# Patient Record
Sex: FEMALE | Race: BLACK OR AFRICAN AMERICAN | NOT HISPANIC OR LATINO | URBAN - METROPOLITAN AREA
[De-identification: names, ages, dates, MRNs, and addresses within clinical notes are randomized per-mention and may not be internally consistent; named-entity substitution may affect disease eponyms.]

---

## 2017-09-12 ENCOUNTER — INPATIENT (INPATIENT)
Facility: HOSPITAL | Age: 82
LOS: 5 days | Discharge: NURSING, REHAB OR LONG-TERM CARE | DRG: 639 | End: 2017-09-18
Attending: HOSPITALIST | Admitting: HOSPITALIST
Payer: COMMERCIAL

## 2017-09-12 DIAGNOSIS — R00.1 BRADYCARDIA, UNSPECIFIED: ICD-10-CM

## 2017-09-12 DIAGNOSIS — E78.5 HYPERLIPIDEMIA, UNSPECIFIED: ICD-10-CM

## 2017-09-12 DIAGNOSIS — E11.65 TYPE 2 DIABETES MELLITUS WITH HYPERGLYCEMIA: ICD-10-CM
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
## 2017-09-12 DIAGNOSIS — F03.90 UNSPECIFIED DEMENTIA WITHOUT BEHAVIORAL DISTURBANCE: ICD-10-CM

## 2017-09-12 DIAGNOSIS — R53.1 WEAKNESS: ICD-10-CM

## 2017-09-12 DIAGNOSIS — Z88.0 ALLERGY STATUS TO PENICILLIN: ICD-10-CM

## 2017-09-12 DIAGNOSIS — Z88.8 ALLERGY STATUS TO OTHER DRUGS, MEDICAMENTS AND BIOLOGICAL SUBSTANCES: ICD-10-CM

## 2017-09-12 DIAGNOSIS — I10 ESSENTIAL (PRIMARY) HYPERTENSION: ICD-10-CM

## 2017-09-12 DIAGNOSIS — T44.1X5A ADVERSE EFFECT OF OTHER PARASYMPATHOMIMETICS [CHOLINERGICS], INITIAL ENCOUNTER: ICD-10-CM

## 2017-09-12 PROCEDURE — 99223 1ST HOSP IP/OBS HIGH 75: CPT

## 2017-09-12 PROCEDURE — 71010: CPT | Mod: 26

## 2017-09-12 PROCEDURE — 93010 ELECTROCARDIOGRAM REPORT: CPT

## 2017-09-15 PROCEDURE — 99233 SBSQ HOSP IP/OBS HIGH 50: CPT

## 2017-09-17 PROCEDURE — 99233 SBSQ HOSP IP/OBS HIGH 50: CPT

## 2017-09-18 PROCEDURE — 99239 HOSP IP/OBS DSCHRG MGMT >30: CPT

## 2017-09-23 PROCEDURE — 93010 ELECTROCARDIOGRAM REPORT: CPT

## 2017-09-23 PROCEDURE — 71010: CPT | Mod: 26

## 2017-09-24 ENCOUNTER — INPATIENT (INPATIENT)
Facility: HOSPITAL | Age: 82
LOS: 2 days | Discharge: HOME | End: 2017-09-27
Attending: SURGERY | Admitting: SURGERY
Payer: MEDICARE

## 2017-09-24 ENCOUNTER — INPATIENT (INPATIENT)
Facility: HOSPITAL | Age: 82
LOS: 0 days | Discharge: ANOTHER HOSPITAL | DRG: 300 | End: 2017-09-24
Attending: INTERNAL MEDICINE | Admitting: INTERNAL MEDICINE
Payer: MEDICARE

## 2017-09-24 VITALS
TEMPERATURE: 98 F | RESPIRATION RATE: 16 BRPM | SYSTOLIC BLOOD PRESSURE: 97 MMHG | OXYGEN SATURATION: 97 % | DIASTOLIC BLOOD PRESSURE: 56 MMHG | HEART RATE: 76 BPM

## 2017-09-24 DIAGNOSIS — Z85.54 PERSONAL HISTORY OF MALIGNANT NEOPLASM OF URETER: ICD-10-CM

## 2017-09-24 DIAGNOSIS — E11.9 TYPE 2 DIABETES MELLITUS WITHOUT COMPLICATIONS: ICD-10-CM

## 2017-09-24 DIAGNOSIS — I10 ESSENTIAL (PRIMARY) HYPERTENSION: ICD-10-CM

## 2017-09-24 DIAGNOSIS — F03.90 UNSPECIFIED DEMENTIA WITHOUT BEHAVIORAL DISTURBANCE: ICD-10-CM

## 2017-09-24 DIAGNOSIS — N13.30 UNSPECIFIED HYDRONEPHROSIS: ICD-10-CM

## 2017-09-24 DIAGNOSIS — D50.0 IRON DEFICIENCY ANEMIA SECONDARY TO BLOOD LOSS (CHRONIC): ICD-10-CM

## 2017-09-24 DIAGNOSIS — R10.13 EPIGASTRIC PAIN: ICD-10-CM

## 2017-09-24 DIAGNOSIS — R73.9 HYPERGLYCEMIA, UNSPECIFIED: ICD-10-CM

## 2017-09-24 DIAGNOSIS — Z88.0 ALLERGY STATUS TO PENICILLIN: ICD-10-CM

## 2017-09-24 DIAGNOSIS — Z88.8 ALLERGY STATUS TO OTHER DRUGS, MEDICAMENTS AND BIOLOGICAL SUBSTANCES: ICD-10-CM

## 2017-09-24 DIAGNOSIS — K86.3 PSEUDOCYST OF PANCREAS: ICD-10-CM

## 2017-09-24 DIAGNOSIS — E78.5 HYPERLIPIDEMIA, UNSPECIFIED: ICD-10-CM

## 2017-09-24 DIAGNOSIS — I72.8 ANEURYSM OF OTHER SPECIFIED ARTERIES: ICD-10-CM

## 2017-09-24 LAB
ALBUMIN SERPL ELPH-MCNC: 3.4 G/DL — SIGNIFICANT CHANGE UP (ref 3.3–5)
ALP SERPL-CCNC: 80 U/L — SIGNIFICANT CHANGE UP (ref 40–120)
ALT FLD-CCNC: 18 U/L — SIGNIFICANT CHANGE UP (ref 4–33)
APTT BLD: 31.6 SEC — SIGNIFICANT CHANGE UP (ref 27.5–37.4)
AST SERPL-CCNC: 22 U/L — SIGNIFICANT CHANGE UP (ref 4–32)
BASOPHILS # BLD AUTO: 0.02 K/UL — SIGNIFICANT CHANGE UP (ref 0–0.2)
BASOPHILS NFR BLD AUTO: 0.4 % — SIGNIFICANT CHANGE UP (ref 0–2)
BILIRUB SERPL-MCNC: 0.2 MG/DL — SIGNIFICANT CHANGE UP (ref 0.2–1.2)
BLD GP AB SCN SERPL QL: NEGATIVE — SIGNIFICANT CHANGE UP
BUN SERPL-MCNC: 15 MG/DL — SIGNIFICANT CHANGE UP (ref 7–23)
CALCIUM SERPL-MCNC: 8.6 MG/DL — SIGNIFICANT CHANGE UP (ref 8.4–10.5)
CHLORIDE SERPL-SCNC: 102 MMOL/L — SIGNIFICANT CHANGE UP (ref 98–107)
CO2 SERPL-SCNC: 24 MMOL/L — SIGNIFICANT CHANGE UP (ref 22–31)
CREAT SERPL-MCNC: 0.9 MG/DL — SIGNIFICANT CHANGE UP (ref 0.5–1.3)
EOSINOPHIL # BLD AUTO: 0.11 K/UL — SIGNIFICANT CHANGE UP (ref 0–0.5)
EOSINOPHIL NFR BLD AUTO: 2.4 % — SIGNIFICANT CHANGE UP (ref 0–6)
GLUCOSE SERPL-MCNC: 261 MG/DL — HIGH (ref 70–99)
HCT VFR BLD CALC: 27.3 % — LOW (ref 34.5–45)
HGB BLD-MCNC: 8.1 G/DL — LOW (ref 11.5–15.5)
IMM GRANULOCYTES # BLD AUTO: 0.01 # — SIGNIFICANT CHANGE UP
IMM GRANULOCYTES NFR BLD AUTO: 0.2 % — SIGNIFICANT CHANGE UP (ref 0–1.5)
INR BLD: 1 — SIGNIFICANT CHANGE UP (ref 0.88–1.17)
LYMPHOCYTES # BLD AUTO: 1.09 K/UL — SIGNIFICANT CHANGE UP (ref 1–3.3)
LYMPHOCYTES # BLD AUTO: 23.8 % — SIGNIFICANT CHANGE UP (ref 13–44)
MCHC RBC-ENTMCNC: 28.4 PG — SIGNIFICANT CHANGE UP (ref 27–34)
MCHC RBC-ENTMCNC: 29.7 % — LOW (ref 32–36)
MCV RBC AUTO: 95.8 FL — SIGNIFICANT CHANGE UP (ref 80–100)
MONOCYTES # BLD AUTO: 0.39 K/UL — SIGNIFICANT CHANGE UP (ref 0–0.9)
MONOCYTES NFR BLD AUTO: 8.5 % — SIGNIFICANT CHANGE UP (ref 2–14)
NEUTROPHILS # BLD AUTO: 2.96 K/UL — SIGNIFICANT CHANGE UP (ref 1.8–7.4)
NEUTROPHILS NFR BLD AUTO: 64.7 % — SIGNIFICANT CHANGE UP (ref 43–77)
NRBC # FLD: 0 — SIGNIFICANT CHANGE UP
PLATELET # BLD AUTO: 208 K/UL — SIGNIFICANT CHANGE UP (ref 150–400)
PMV BLD: 11.1 FL — SIGNIFICANT CHANGE UP (ref 7–13)
POTASSIUM SERPL-MCNC: 4.5 MMOL/L — SIGNIFICANT CHANGE UP (ref 3.5–5.3)
POTASSIUM SERPL-SCNC: 4.5 MMOL/L — SIGNIFICANT CHANGE UP (ref 3.5–5.3)
PROT SERPL-MCNC: 7 G/DL — SIGNIFICANT CHANGE UP (ref 6–8.3)
PROTHROM AB SERPL-ACNC: 11.2 SEC — SIGNIFICANT CHANGE UP (ref 9.8–13.1)
RBC # BLD: 2.85 M/UL — LOW (ref 3.8–5.2)
RBC # FLD: 15.6 % — HIGH (ref 10.3–14.5)
RH IG SCN BLD-IMP: POSITIVE — SIGNIFICANT CHANGE UP
RH IG SCN BLD-IMP: POSITIVE — SIGNIFICANT CHANGE UP
SODIUM SERPL-SCNC: 140 MMOL/L — SIGNIFICANT CHANGE UP (ref 135–145)
WBC # BLD: 4.58 K/UL — SIGNIFICANT CHANGE UP (ref 3.8–10.5)
WBC # FLD AUTO: 4.58 K/UL — SIGNIFICANT CHANGE UP (ref 3.8–10.5)

## 2017-09-24 PROCEDURE — 99223 1ST HOSP IP/OBS HIGH 75: CPT

## 2017-09-24 PROCEDURE — 74176 CT ABD & PELVIS W/O CONTRAST: CPT | Mod: 26

## 2017-09-24 PROCEDURE — 74174 CTA ABD&PLVS W/CONTRAST: CPT | Mod: 26

## 2017-09-24 PROCEDURE — 99223 1ST HOSP IP/OBS HIGH 75: CPT | Mod: AI

## 2017-09-24 NOTE — ED ADULT NURSE REASSESSMENT NOTE - NS ED NURSE REASSESS COMMENT FT1
report recd from daytime rn, patient resting comfortably in stretcher, patient a*ox1 at baseline, unsure of day or situation. denies pain or discomfort, respirations are even and unlabored. vitals stable. will continue to monitor patient closely. awaiting dispo.

## 2017-09-24 NOTE — CONSULT NOTE ADULT - SUBJECTIVE AND OBJECTIVE BOX
** INCOMPLETE    HISTORY OF PRESENT ILLNESS:  DOREEN BROWN is a 82y Female     PAST MEDICAL HISTORY:  pancreatic pseudocyst, DM, dementia      PAST SURGICAL HISTORY: lap howard, hysterectomy, ureter cancer resection     FAMILY HISTORY: Unknown    SOCIAL HISTORY: quit smoking 30 years ago, no alcohol use     CODE STATUS: DNR    HOME MEDICATIONS: amlodipine 10 mg, Aricept 10 mg, Zetia 10 mg, Namenda 10 mg BID, Quetiapine 25 mg BID, Ramipril 5 mg, Sertraline 52 mg, Insulin      ALLERGIES: penicillin (Unknown)  statins (Unknown)      VITAL SIGNS:  ICU Vital Signs Last 24 Hrs  T(C): 36.7 (24 Sep 2017 22:03), Max: 36.9 (24 Sep 2017 18:50)  T(F): 98 (24 Sep 2017 22:03), Max: 98.4 (24 Sep 2017 18:50)  HR: 60 (24 Sep 2017 22:50) (60 - 76)  BP: 115/58 (24 Sep 2017 22:50) (97/56 - 139/71)  BP(mean): --  ABP: --  ABP(mean): --  RR: 15 (24 Sep 2017 22:50) (15 - 18)  SpO2: 100% (24 Sep 2017 22:50) (97% - 100%)      NEURO  Exam:  Meds:    RESPIRATORY  Mechanical Ventilation:     Exam:  Meds:    CARDIOVASCULAR    Exam:  Cardiac Rhythm:  Meds:    GI/NUTRITION  Exam:  Diet:  Meds:    GENITOURINARY/RENAL  Meds:      09-24    140  |  102  |  15  ----------------------------<  261<H>  4.5   |  24  |  0.90    Ca    8.6      24 Sep 2017 19:25    TPro  7.0  /  Alb  3.4  /  TBili  0.2  /  DBili  x   /  AST  22  /  ALT  18  /  AlkPhos  80  09-24    [ ] Hood catheter, indication: urine output monitoring in critically ill patient    HEMATOLOGIC  [ ] VTE Prophylaxis:                          8.1    4.58  )-----------( 208      ( 24 Sep 2017 19:25 )             27.3     PT/INR - ( 24 Sep 2017 19:25 )   PT: 11.2 SEC;   INR: 1.00          PTT - ( 24 Sep 2017 19:25 )  PTT:31.6 SEC  Transfusion: [ ] PRBC	[ ] Platelets	[ ] FFP	[ ] Cryoprecipitate      INFECTIOUS DISEASES  Meds:  RECENT CULTURES:      ENDOCRINE  Meds:  CAPILLARY BLOOD GLUCOSE          PATIENT CARE ACCESS DEVICES:  [ ] Peripheral IV  [ ] Central Venous Line	[ ] R	[ ] L	[ ] IJ	[ ] Fem	[ ] SC	Placed:   [ ] Arterial Line		[ ] R	[ ] L	[ ] Fem	[ ] Rad	[ ] Ax	Placed:   [ ] PICC:					[ ] Mediport  [ ] Urinary Catheter, Date Placed:   [x] Necessity of urinary, arterial, and venous catheters discussed    OTHER MEDICATIONS:     IMAGING STUDIES: HISTORY OF PRESENT ILLNESS:  DOREEN BROWN is a 82y woman with DM, Alzheimer's dementia (AO x 1 at baseline) who presented to [place] [facility] with epigastric pain for one day. 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 Patient transferred to [facility] for definitive management.     SICU consulted for hemodynamic monitoring.     PAST MEDICAL HISTORY:  pancreatic pseudocyst, DM, dementia      PAST SURGICAL HISTORY: lap howard, hysterectomy, ureter cancer resection     FAMILY HISTORY: Unknown    SOCIAL HISTORY: quit smoking 30 years ago, no alcohol use     CODE STATUS: DNR    HOME MEDICATIONS: amlodipine 10 mg, Aricept 10 mg, Zetia 10 mg, Namenda 10 mg BID, Quetiapine 25 mg BID, Ramipril 5 mg, Sertraline 52 mg, Insulin      ALLERGIES: penicillin (Unknown)  statins (Unknown)      VITAL SIGNS:  ICU Vital Signs Last 24 Hrs  T(C): 36.7 (24 Sep 2017 22:03), Max: 36.9 (24 Sep 2017 18:50)  T(F): 98 (24 Sep 2017 22:03), Max: 98.4 (24 Sep 2017 18:50)  HR: 60 (24 Sep 2017 22:50) (60 - 76)  BP: 115/58 (24 Sep 2017 22:50) (97/56 - 139/71)  BP(mean): --  ABP: --  ABP(mean): --  RR: 15 (24 Sep 2017 22:50) (15 - 18)  SpO2: 100% (24 Sep 2017 22:50) (97% - 100%)      NEURO  Exam: Alert, oriented to name and location "hospital" only. Patient unsure why she is admitted despite frequent explanations.   Meds: x    RESPIRATORY  Mechanical Ventilation: x    Exam: CTAB  Meds: x    CARDIOVASCULAR    Exam: S1/ S2  Cardiac Rhythm: sinus  Meds: x    GI/NUTRITION  Exam: Soft, NT, ND. Infraumbilical midline well-healed scar. Subxiphoid well-healed scar.   Diet: NPO  Meds: x    GENITOURINARY/RENAL  Meds: x      09-24    140  |  102  |  15  ----------------------------<  261<H>  4.5   |  24  |  0.90    Ca    8.6      24 Sep 2017 19:25    TPro  7.0  /  Alb  3.4  /  TBili  0.2  /  DBili  x   /  AST  22  /  ALT  18  /  AlkPhos  80  09-24    [ ] Hood catheter, indication: urine output monitoring in critically ill patient    HEMATOLOGIC  [ ] VTE Prophylaxis:                          8.1    4.58  )-----------( 208      ( 24 Sep 2017 19:25 )             27.3     PT/INR - ( 24 Sep 2017 19:25 )   PT: 11.2 SEC;   INR: 1.00          PTT - ( 24 Sep 2017 19:25 )  PTT:31.6 SEC  Transfusion: [ ] PRBC	[ ] Platelets	[ ] FFP	[ ] Cryoprecipitate      INFECTIOUS DISEASES  Meds:   RECENT CULTURES:      ENDOCRINE  Meds:  CAPILLARY BLOOD GLUCOSE          PATIENT CARE ACCESS DEVICES:  [ ] Peripheral IV  [ ] Central Venous Line	[ ] R	[ ] L	[ ] IJ	[ ] Fem	[ ] SC	Placed:   [ ] Arterial Line		[ ] R	[ ] L	[ ] Fem	[ ] Rad	[ ] Ax	Placed:   [ ] PICC:					[ ] Mediport  [ ] Urinary Catheter, Date Placed:   [x] Necessity of urinary, arterial, and venous catheters discussed    OTHER MEDICATIONS:     IMAGING STUDIES: EXAM: CT ANGIO ABD PELV (W)AW IC    PROCEDURE DATE: 09/24/2017      INTERPRETATION: CLINICAL INFORMATION: Splenic artery aneurysm    COMPARISON: Noncontrast CT of the abdomen and pelvis dated 9/24/2017    PROCEDURE:  CTA of the abdomen and pelvis was performed without oral contrast. 90 cc of  Omnipaque 350 were administered. 10 cc were discarded. Coronal and sagittal  reformats were performed.    FINDINGS:    LOWER CHEST: Trace right basilar atelectasis.    LIVER: For a few small arterially enhancing lesions within the right hepatic  lobe. In addition there are several subcentimeter hypodensities which are  too small to characterize.  BILE DUCTS: Normal caliber.  GALLBLADDER: Status post cholecystectomy.  SPLEEN: Demonstration of a 9.9 cm subcapsular splenic collection. There are  2 hyperenhancing lesions within the spleen measuring up to 1.6 cm which are  indeterminate.  PANCREAS: Within normal limits.  ADRENALS: Within normal limits.  KIDNEYS/URETERS: Severe left hydroureteronephrosis. There are several  cortical subcentimeter hypodensities which are too small to characterize.    BLADDER: Well distended.  REPRODUCTIVE ORGANS: Status post hysterectomy.    BOWEL: No bowel obstruction. Extensive colonic diverticulosis  PERITONEUM: No ascites.  VESSELS: Marked atherosclerotic changes of the aorta and branching vessels.  One seems distal splenic artery aneurysm, as above. There is mild stenosis  at the origin of the celiac axis with poststenotic dilatation.  RETROPERITONEUM: There is redemonstration of a mixed density collection in  the left hemiabdomen which measures 6.9 x 5.6 x 5.6 cm , centered around a  1 cm enhancing focus connected to a distal branch of the splenic artery.  There are a large number of surgical clips in the retroperitoneum  ABDOMINAL WALL: Within normal limits.  BONES: Available degenerative changes of the spine with mild dextro  scoliosis of the lumbar spine. Degenerative changes of the hips.    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 No active  extravasation identified at this time. This critical finding was discussed  with Dr. Taqueria Gomez at 4:30 PM on 9/24/2017.    Severe left hydronephrosis and large subcapsular splenic collection,  unchanged from the prior study.    Additional findings as above. HISTORY OF PRESENT ILLNESS:  DOREEN BROWN is a 82y woman with DM, Alzheimer's dementia (AO x 1 at baseline) who presented to [facility] with epigastric pain for one day. Per report, the patient's pain is in the upper abdomen, no dizziness or lightheadedness. The patient is without complaints at this time and is unsure why she is in the hospital. No upper or lower GI bleeding. She was diagnosed with pancreatic pseudocyst 1 year ago but has not had symptoms related to it.      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 The patient was transferred to [facility] for definitive management. SICU consulted for hemodynamic monitoring.     PAST MEDICAL HISTORY:  pancreatic pseudocyst, DM, dementia      PAST SURGICAL HISTORY: lap howard, hysterectomy, ureter cancer resection     FAMILY HISTORY: Unknown    SOCIAL HISTORY: quit smoking 30 years ago, no alcohol use     CODE STATUS: DNR    HOME MEDICATIONS: amlodipine 10 mg, Aricept 10 mg, Zetia 10 mg, Namenda 10 mg BID, Quetiapine 25 mg BID, Ramipril 5 mg, Sertraline 52 mg, Insulin      ALLERGIES: penicillin (Unknown)  statins (Unknown)      VITAL SIGNS:  ICU Vital Signs Last 24 Hrs  T(C): 36.7 (24 Sep 2017 22:03), Max: 36.9 (24 Sep 2017 18:50)  T(F): 98 (24 Sep 2017 22:03), Max: 98.4 (24 Sep 2017 18:50)  HR: 60 (24 Sep 2017 22:50) (60 - 76)  BP: 115/58 (24 Sep 2017 22:50) (97/56 - 139/71)  BP(mean): --  ABP: --  ABP(mean): --  RR: 15 (24 Sep 2017 22:50) (15 - 18)  SpO2: 100% (24 Sep 2017 22:50) (97% - 100%)      NEURO  Exam: Alert, oriented to name and location "hospital" only. Patient unsure why she is admitted despite frequent explanations.   Meds: x    RESPIRATORY  Mechanical Ventilation: x    Exam: CTAB  Meds: x    CARDIOVASCULAR    Exam: S1/ S2  Cardiac Rhythm: sinus  Meds: x    GI/NUTRITION  Exam: Soft, NT, ND. Infraumbilical midline well-healed scar. Subxiphoid well-healed scar.   Diet: NPO  Meds: x    GENITOURINARY/RENAL  Meds: x      09-24    140  |  102  |  15  ----------------------------<  261<H>  4.5   |  24  |  0.90    Ca    8.6      24 Sep 2017 19:25    TPro  7.0  /  Alb  3.4  /  TBili  0.2  /  DBili  x   /  AST  22  /  ALT  18  /  AlkPhos  80  09-24    [ ] Hood catheter, indication: urine output monitoring in critically ill patient    HEMATOLOGIC  [ ] VTE Prophylaxis:                          8.1    4.58  )-----------( 208      ( 24 Sep 2017 19:25 )             27.3     PT/INR - ( 24 Sep 2017 19:25 )   PT: 11.2 SEC;   INR: 1.00          PTT - ( 24 Sep 2017 19:25 )  PTT:31.6 SEC  Transfusion: [ ] PRBC	[ ] Platelets	[ ] FFP	[ ] Cryoprecipitate      INFECTIOUS DISEASES  Meds:   RECENT CULTURES:      ENDOCRINE  Meds:  CAPILLARY BLOOD GLUCOSE          PATIENT CARE ACCESS DEVICES:  [ ] Peripheral IV  [ ] Central Venous Line	[ ] R	[ ] L	[ ] IJ	[ ] Fem	[ ] SC	Placed:   [ ] Arterial Line		[ ] R	[ ] L	[ ] Fem	[ ] Rad	[ ] Ax	Placed:   [ ] PICC:					[ ] Mediport  [ ] Urinary Catheter, Date Placed:   [x] Necessity of urinary, arterial, and venous catheters discussed    OTHER MEDICATIONS:     IMAGING STUDIES: EXAM: CT ANGIO ABD PELV (W)AW IC    PROCEDURE DATE: 09/24/2017      INTERPRETATION: CLINICAL INFORMATION: Splenic artery aneurysm    COMPARISON: Noncontrast CT of the abdomen and pelvis dated 9/24/2017    PROCEDURE:  CTA of the abdomen and pelvis was performed without oral contrast. 90 cc of  Omnipaque 350 were administered. 10 cc were discarded. Coronal and sagittal  reformats were performed.    FINDINGS:    LOWER CHEST: Trace right basilar atelectasis.    LIVER: For a few small arterially enhancing lesions within the right hepatic  lobe. In addition there are several subcentimeter hypodensities which are  too small to characterize.  BILE DUCTS: Normal caliber.  GALLBLADDER: Status post cholecystectomy.  SPLEEN: Demonstration of a 9.9 cm subcapsular splenic collection. There are  2 hyperenhancing lesions within the spleen measuring up to 1.6 cm which are  indeterminate.  PANCREAS: Within normal limits.  ADRENALS: Within normal limits.  KIDNEYS/URETERS: Severe left hydroureteronephrosis. There are several  cortical subcentimeter hypodensities which are too small to characterize.    BLADDER: Well distended.  REPRODUCTIVE ORGANS: Status post hysterectomy.    BOWEL: No bowel obstruction. Extensive colonic diverticulosis  PERITONEUM: No ascites.  VESSELS: Marked atherosclerotic changes of the aorta and branching vessels.  One seems distal splenic artery aneurysm, as above. There is mild stenosis  at the origin of the celiac axis with poststenotic dilatation.  RETROPERITONEUM: There is redemonstration of a mixed density collection in  the left hemiabdomen which measures 6.9 x 5.6 x 5.6 cm , centered around a  1 cm enhancing focus connected to a distal branch of the splenic artery.  There are a large number of surgical clips in the retroperitoneum  ABDOMINAL WALL: Within normal limits.  BONES: Available degenerative changes of the spine with mild dextro  scoliosis of the lumbar spine. Degenerative changes of the hips.    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 No active  extravasation identified at this time. This critical finding was discussed  with Dr. Taqueria Gomez at 4:30 PM on 9/24/2017.    Severe left hydronephrosis and large subcapsular splenic collection,  unchanged from the prior study.    Additional findings as above.

## 2017-09-24 NOTE — H&P ADULT - HISTORY OF PRESENT ILLNESS
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 She was sent for evaluation by vascular surgery. Now she has no complaints although she is poor historian with dementia.   History obtained from Son (Mr. Decker). Patient is AOx1 at baseline. She lives in assisted living. She was recently hospitalized at [facility] and diagnosed with DM. Her pain is in the upper abdomen, no dizziness or lightheadedness. No upper or lower GI bleeding. She was diagnosed with pancreatic pseudocyst 1 year ago but has not had symptoms related to it.     PMH: pancreatic pseudocyst, DM, dementia    PSH: lap howard, hysterectomy, ureter cancer resection   SH: quit smoking 30 years ago, no alcohol use   Meds: amlodipine 10 mg, Aricept 10 mg, Zetia 10 mg, Namenda 10 mg BID, Quetiapine 25 mg BID, Ramipril 5 mg, Sertraline 52 mg, Insulin    Allergies: PCN, statin     Vital Signs Last 24 Hrs  T(C): 36.7 (24 Sep 2017 22:03), Max: 36.9 (24 Sep 2017 18:50)  T(F): 98 (24 Sep 2017 22:03), Max: 98.4 (24 Sep 2017 18:50)  HR: 72 (24 Sep 2017 22:03) (72 - 76)  BP: 139/71 (24 Sep 2017 22:03) (97/56 - 139/71)  BP(mean): --  RR: 18 (24 Sep 2017 22:03) (16 - 18)  SpO2: 99% (24 Sep 2017 22:03) (97% - 100%)     Physical Exam:   General : pleasantly demented, AOx1   Reps: CTA b/l   CV: RRR  Abd; soft, ND, NTTP, no palpable masses                 8.1                  140  | 24   | 15           4.58  >-----------< 208     ------------------------< 261                   27.3                 4.5  | 102  | 0.90                                         Ca 8.6   Mg x     Ph x          A/P: 82 year old female with splenic artery pseudoaneurysm which recently bled.   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 She was sent for evaluation by vascular surgery. Now she has no complaints although she is poor historian with dementia.   History obtained from Son (Mr. Decker). Patient is AOx1 at baseline. She lives in assisted living. She was recently hospitalized at [place] and diagnosed with DM. Her pain is in the upper abdomen, no dizziness or lightheadedness. No upper or lower GI bleeding. She was diagnosed with pancreatic pseudocyst 1 year ago but has not had symptoms related to it.     PMH: pancreatic pseudocyst, DM, dementia    PSH: lap howard, hysterectomy, ureter cancer resection   SH: quit smoking 30 years ago, no alcohol use   Meds: amlodipine 10 mg, Aricept 10 mg, Zetia 10 mg, Namenda 10 mg BID, Quetiapine 25 mg BID, Ramipril 5 mg, Sertraline 52 mg, Insulin    Allergies: PCN, statin     ROS: unable to obtain 2/2 dementia    Vital Signs Last 24 Hrs  T(C): 36.7 (24 Sep 2017 22:03), Max: 36.9 (24 Sep 2017 18:50)  T(F): 98 (24 Sep 2017 22:03), Max: 98.4 (24 Sep 2017 18:50)  HR: 72 (24 Sep 2017 22:03) (72 - 76)  BP: 139/71 (24 Sep 2017 22:03) (97/56 - 139/71)  BP(mean): --  RR: 18 (24 Sep 2017 22:03) (16 - 18)  SpO2: 99% (24 Sep 2017 22:03) (97% - 100%)     Physical Exam:   General : pleasantly demented, AOx1   Reps: CTA b/l   CV: RRR  Abd; soft, ND, NTTP, no palpable masses                 8.1                  140  | 24   | 15           4.58  >-----------< 208     ------------------------< 261                   27.3                 4.5  | 102  | 0.90                                         Ca 8.6   Mg x     Ph x          A/P: 82 year old female with splenic artery pseudoaneurysm which recently bled.   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 She was sent for evaluation by vascular surgery. Now she has no complaints although she is poor historian with dementia.   History obtained from Son (Mr. Decker). Patient is AOx1 at baseline. She lives in assisted living. She was recently hospitalized at [place] and diagnosed with DM. Her pain is in the upper abdomen, no dizziness or lightheadedness. No upper or lower GI bleeding. She was diagnosed with pancreatic pseudocyst 1 year ago but has not had symptoms related to it.     PMH: pancreatic pseudocyst, DM, dementia    PSH: lap howard, hysterectomy, ureter cancer resection   SH: quit smoking 30 years ago, no alcohol use   Meds: amlodipine 10 mg, Aricept 10 mg, Zetia 10 mg, Namenda 10 mg BID, Quetiapine 25 mg BID, Ramipril 5 mg, Sertraline 52 mg, Insulin    Allergies: PCN, statin     ROS: unable to obtain 2/2 dementia    Vital Signs Last 24 Hrs  T(C): 36.7 (24 Sep 2017 22:03), Max: 36.9 (24 Sep 2017 18:50)  T(F): 98 (24 Sep 2017 22:03), Max: 98.4 (24 Sep 2017 18:50)  HR: 72 (24 Sep 2017 22:03) (72 - 76)  BP: 139/71 (24 Sep 2017 22:03) (97/56 - 139/71)  BP(mean): --  RR: 18 (24 Sep 2017 22:03) (16 - 18)  SpO2: 99% (24 Sep 2017 22:03) (97% - 100%)     Physical Exam:   General : pleasantly demented, AOx1   Reps: CTA b/l   CV: RRR  Abd; soft, ND, NTTP, no palpable masses                 8.1                  140  | 24   | 15           4.58  >-----------< 208     ------------------------< 261                   27.3                 4.5  | 102  | 0.90                                         Ca 8.6   Mg x     Ph x          A/P: 82 year old female with splenic artery pseudoaneurysm which recently bled.   - IR consult for emergent embolization. The vascular fellow (Dr. Gilbert), senior resident (Dr. Alaniz), myself (Dr. Duke) spoke with IR team on call regarding this patient around 08:20 PM. As per IR, there is no need to perform this procedure urgently as the patient is hemodynamically stable and she has not been NPO for > 8 hours. This patient was transferred to the surgical ICU for higher level of nursing care.   - SICU consult.   - NPO   - Discussed with vascular fellow

## 2017-09-24 NOTE — ED PROVIDER NOTE - PHYSICAL EXAMINATION
Gen: NAD, AOx1, non-toxic //            Head: NCAT //            HEENT: EOMI, oral mucosa dry normal conjunctiva //            Lung: CTAB, no respiratory distress, no wheezes/rhonchi/rales B/L, speaking in full sentences. //            CV: RRR, no murmurs, rubs or gallops //            Abd: soft, tenderness to palpation in LUQ, no guarding, no CVA tenderness //            MSK: no visible deformities //            Neuro: No focal sensory or motor deficits //            Skin: Warm, well perfused, no rash //            Psych: normal affect. ~Yannick Wiggins M.D., Ph.D. -Resident

## 2017-09-24 NOTE — ED ADULT NURSE NOTE - OBJECTIVE STATEMENT
pt rec'd to R#[id number], transferred from [facility] for vascular consult r/t splenic artery aneurism found on CT today after pt c/o abd pain. Pt arrives in NAD, denies any abdominal pain/ other acute complaints. VSS. Oriented to self; PMH dementia. PIVx2 in place as documented in FS. Seen by MD. Awaiting test results and surg consult. Will continue to monitor.

## 2017-09-24 NOTE — ED PROVIDER NOTE - ATTENDING CONTRIBUTION TO CARE
Attending Statement: I have personally seen and examined this patient. I have fully participated in the care of this patient. I have reviewed all pertinent clinical information, including history physical exam, plan and the Resident's note and agree except as noted  81yo F hx of DM, pancreatic pseudocyst, dementia transferred from [place] for splenic aneurysm  on CT abdomen. Pt presented  w abdominal pain as per note. pt denies any complaints at this time. States "I think I had some pain" no nausea/vomit no chest pain or sob. pt poor historian. no family at bedside.   Vital signs noted. nontoxic, pleasant demented female. normal S1-S2 No resp distress. able to speak in full and clear sentences. no wheeze, rales or stridor. soft nondistended nontender abdomen.  plan labs, vascular surgery consult.

## 2017-09-24 NOTE — ED PROVIDER NOTE - OBJECTIVE STATEMENT
82 female history of DM2, pancreatic psyudocyst, dementia transferred by ambulance from [place] for abdominal pain, found to have splenic artery aneurism. She was sent for evaluation by vascular surgery.   PCP: Angel 82 female history of DM2, pancreatic psyudocyst, dementia transferred by ambulance from [place] for abdominal pain, found to have splenic artery aneurism on CTA abdomen. She was sent for evaluation by vascular surgery. Now she has no complaints although she is poor historian with dementia.   PCP: Angel 82 female history of DM2, pancreatic psyudocyst, dementia transferred by ambulance from [place] for abdominal pain, found to have splenic artery aneurism on CTA abdomen. She was sent for evaluation by vascular surgery. Now she has no complaints although she is poor historian with dementia.   PCP: Angel  Accepting ER doc: Dr. Canela  Accepting Vascular Sx: Dr. Leung

## 2017-09-24 NOTE — ED PROVIDER NOTE - PROGRESS NOTE DETAILS
Patient stable. Consulted gen surg resident for vascular consult right before placing the orders for this patient. Surgery resident and fellow, however, were busy with SICU emergency so disposition was made at 2200 to SICU for IR embolization and high level of care.

## 2017-09-24 NOTE — ED ADULT NURSE NOTE - CHIEF COMPLAINT QUOTE
pt BIBA from [facility].  pt transferred to [facility] for CT Surgery.  pt c/o abd pain and was found to have a spenic artery anuerism

## 2017-09-24 NOTE — H&P ADULT - ATTENDING COMMENTS
Pt. was seen and examined. Agree with above. Plan d/w the team.  83yo F with dementia transferred from an outside hospital for abd pain and splenic art aneurysms with surrounding hematoma  Pt. is hemodynamically stable  Abd pain resolved  Hg 8 from 9 in [facility]  imaging reviewed: large hematoma around the spleen, no active extrav, one intraparenchymal splenic and one in the hylum  Plan: admit to ICU  serial Hg  IR was contacted for embolization

## 2017-09-24 NOTE — ED ADULT TRIAGE NOTE - CHIEF COMPLAINT QUOTE
pt BIBA from [facility].  pt transferred to [facility] for CT Surgery.  pt c/o abd pain and was found to have a spenic artery anuerism

## 2017-09-24 NOTE — ED PROVIDER NOTE - MEDICAL DECISION MAKING DETAILS
Patient transferred here with known Splenic A. Aneurism. Armida is consulted. Will do pre-op labs, patient's pain is well controlled at this time, dispo pending sx.

## 2017-09-24 NOTE — ED PROVIDER NOTE - CARE PLAN
Principal Discharge DX:	Epigastric abdominal pain Principal Discharge DX:	Epigastric abdominal pain  Secondary Diagnosis:	Splenic artery aneurysm

## 2017-09-24 NOTE — CONSULT NOTE ADULT - ASSESSMENT
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   - If patient becomes hypotensive, will anticipate an emergent splenectomy    GI:   - NPO / IVF    Renal: No acute issues    Heme:     ID: No acute issues     Endo: DM   - ISS q6      Dispo: SICU; DNR  Patient discussed with SICU attending, Dr. 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     Pulm: No acute issues     CV: Splenic artery pseudoaneurysm  - SBP goal of < 160 to reduce shear stress; will give metoprolol 5 q6  - IR splenic artery embolization Monday morning.   - If patient becomes hypotensive, will anticipate an emergent splenectomy     GI: Malnutrition  - NPO / IVF for IR    Renal: No acute issues  - Will consider mucomyst for renal protection prior to IR angiography     Heme: PSA with recent bleed, VTE prophylaxis  - Monitor CBC q4; transfuse for hbg < 7  - Pharmacologic VTE ppx contraindicated; mechanical VTE ppx with SCDs only    ID: No acute issues   Lines: PIV only. No indication for Hood catheter at this time.     Endo: DM   - ISS q6      Dispo: SICU; DNR  Patient discussed with SICU attending, Dr. 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     Pulm: No acute issues     CV: Splenic artery pseudoaneurysm  - SBP goal of < 160 to reduce shear stress; will give metoprolol 5 q6  - IR splenic artery embolization Monday morning.   - If patient becomes hypotensive, will anticipate an emergent splenectomy     GI: Malnutrition  - NPO / IVF for IR    Renal: No acute issues  - Will consider mucomyst for renal protection prior to IR angiography     Heme: PSA with recent bleed, VTE prophylaxis  - Monitor CBC q4; transfuse for hgb < 7  - Pharmacologic VTE ppx contraindicated; mechanical VTE ppx with SCDs only    ID: No acute issues   Lines: PIV only. No indication for Hood catheter at this time.     Endo: DM   - ISS q6      Dispo: SICU; DNR  Patient discussed with SICU attending, Dr. 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     Pulm: No acute issues     CV: Splenic artery pseudoaneurysm  - SBP goal of < 160 to reduce shear stress; will give metoprolol 5 q6  - IR splenic artery embolization Monday morning.   - If patient becomes hypotensive, will anticipate an emergent splenectomy     GI: Malnutrition  - NPO / IVF for IR    Renal: No acute issues  - Will consider mucomyst for renal protection prior to IR angiography     Heme: PSA with recent bleed, VTE prophylaxis  - Monitor CBC q4; transfuse for hgb < 7. Patient has an active Type and Screen.   - Pharmacologic VTE ppx contraindicated; mechanical VTE ppx with SCDs only    ID: No acute issues   Lines: PIV only. No indication for Hood catheter at this time.     Endo: DM   - ISS q6      Dispo: SICU; DNR  Patient discussed with SICU attending, Dr. Mikel Baca MD PGY2  SICU Spectra: [phone]

## 2017-09-25 LAB
APTT BLD: 32.5 SEC — SIGNIFICANT CHANGE UP (ref 27.5–37.4)
BUN SERPL-MCNC: 15 MG/DL — SIGNIFICANT CHANGE UP (ref 7–23)
CALCIUM SERPL-MCNC: 9.5 MG/DL — SIGNIFICANT CHANGE UP (ref 8.4–10.5)
CHLORIDE SERPL-SCNC: 97 MMOL/L — LOW (ref 98–107)
CO2 SERPL-SCNC: 28 MMOL/L — SIGNIFICANT CHANGE UP (ref 22–31)
CREAT SERPL-MCNC: 0.92 MG/DL — SIGNIFICANT CHANGE UP (ref 0.5–1.3)
GLUCOSE SERPL-MCNC: 200 MG/DL — HIGH (ref 70–99)
HBA1C BLD-MCNC: 10.7 % — HIGH (ref 4–5.6)
HCT VFR BLD CALC: 27.7 % — LOW (ref 34.5–45)
HCT VFR BLD CALC: 31.7 % — LOW (ref 34.5–45)
HCT VFR BLD CALC: 31.7 % — LOW (ref 34.5–45)
HGB BLD-MCNC: 8.2 G/DL — LOW (ref 11.5–15.5)
HGB BLD-MCNC: 9.3 G/DL — LOW (ref 11.5–15.5)
HGB BLD-MCNC: 9.4 G/DL — LOW (ref 11.5–15.5)
INR BLD: 0.98 — SIGNIFICANT CHANGE UP (ref 0.88–1.17)
MAGNESIUM SERPL-MCNC: 2 MG/DL — SIGNIFICANT CHANGE UP (ref 1.6–2.6)
MCHC RBC-ENTMCNC: 27.9 PG — SIGNIFICANT CHANGE UP (ref 27–34)
MCHC RBC-ENTMCNC: 27.9 PG — SIGNIFICANT CHANGE UP (ref 27–34)
MCHC RBC-ENTMCNC: 28.4 PG — SIGNIFICANT CHANGE UP (ref 27–34)
MCHC RBC-ENTMCNC: 29.3 % — LOW (ref 32–36)
MCHC RBC-ENTMCNC: 29.6 % — LOW (ref 32–36)
MCHC RBC-ENTMCNC: 29.7 % — LOW (ref 32–36)
MCV RBC AUTO: 94.1 FL — SIGNIFICANT CHANGE UP (ref 80–100)
MCV RBC AUTO: 94.2 FL — SIGNIFICANT CHANGE UP (ref 80–100)
MCV RBC AUTO: 96.6 FL — SIGNIFICANT CHANGE UP (ref 80–100)
NRBC # FLD: 0 — SIGNIFICANT CHANGE UP
PHOSPHATE SERPL-MCNC: 3 MG/DL — SIGNIFICANT CHANGE UP (ref 2.5–4.5)
PLATELET # BLD AUTO: 188 K/UL — SIGNIFICANT CHANGE UP (ref 150–400)
PLATELET # BLD AUTO: 238 K/UL — SIGNIFICANT CHANGE UP (ref 150–400)
PLATELET # BLD AUTO: 243 K/UL — SIGNIFICANT CHANGE UP (ref 150–400)
PMV BLD: 11.2 FL — SIGNIFICANT CHANGE UP (ref 7–13)
PMV BLD: 11.3 FL — SIGNIFICANT CHANGE UP (ref 7–13)
PMV BLD: 11.6 FL — SIGNIFICANT CHANGE UP (ref 7–13)
POTASSIUM SERPL-MCNC: 4.4 MMOL/L — SIGNIFICANT CHANGE UP (ref 3.5–5.3)
POTASSIUM SERPL-SCNC: 4.4 MMOL/L — SIGNIFICANT CHANGE UP (ref 3.5–5.3)
PROTHROM AB SERPL-ACNC: 11 SEC — SIGNIFICANT CHANGE UP (ref 9.8–13.1)
RBC # BLD: 2.94 M/UL — LOW (ref 3.8–5.2)
RBC # BLD: 3.28 M/UL — LOW (ref 3.8–5.2)
RBC # BLD: 3.37 M/UL — LOW (ref 3.8–5.2)
RBC # FLD: 15.4 % — HIGH (ref 10.3–14.5)
RBC # FLD: 15.4 % — HIGH (ref 10.3–14.5)
RBC # FLD: 15.5 % — HIGH (ref 10.3–14.5)
SODIUM SERPL-SCNC: 136 MMOL/L — SIGNIFICANT CHANGE UP (ref 135–145)
WBC # BLD: 4.85 K/UL — SIGNIFICANT CHANGE UP (ref 3.8–10.5)
WBC # BLD: 4.86 K/UL — SIGNIFICANT CHANGE UP (ref 3.8–10.5)
WBC # BLD: 5.12 K/UL — SIGNIFICANT CHANGE UP (ref 3.8–10.5)
WBC # FLD AUTO: 4.85 K/UL — SIGNIFICANT CHANGE UP (ref 3.8–10.5)
WBC # FLD AUTO: 4.86 K/UL — SIGNIFICANT CHANGE UP (ref 3.8–10.5)
WBC # FLD AUTO: 5.12 K/UL — SIGNIFICANT CHANGE UP (ref 3.8–10.5)

## 2017-09-25 PROCEDURE — 36247 INS CATH ABD/L-EXT ART 3RD: CPT | Mod: RT

## 2017-09-25 PROCEDURE — 76937 US GUIDE VASCULAR ACCESS: CPT | Mod: 26

## 2017-09-25 PROCEDURE — 99231 SBSQ HOSP IP/OBS SF/LOW 25: CPT

## 2017-09-25 PROCEDURE — 37242 VASC EMBOLIZE/OCCLUDE ARTERY: CPT

## 2017-09-25 PROCEDURE — 99233 SBSQ HOSP IP/OBS HIGH 50: CPT

## 2017-09-25 PROCEDURE — 93010 ELECTROCARDIOGRAM REPORT: CPT

## 2017-09-25 RX ORDER — SODIUM CHLORIDE 9 MG/ML
1000 INJECTION, SOLUTION INTRAVENOUS
Qty: 0 | Refills: 0 | Status: DISCONTINUED | OUTPATIENT
Start: 2017-09-25 | End: 2017-09-26

## 2017-09-25 RX ORDER — MEMANTINE HYDROCHLORIDE 10 MG/1
10 TABLET ORAL
Qty: 0 | Refills: 0 | Status: DISCONTINUED | OUTPATIENT
Start: 2017-09-25 | End: 2017-09-27

## 2017-09-25 RX ORDER — INSULIN LISPRO 100/ML
VIAL (ML) SUBCUTANEOUS EVERY 6 HOURS
Qty: 0 | Refills: 0 | Status: DISCONTINUED | OUTPATIENT
Start: 2017-09-25 | End: 2017-09-25

## 2017-09-25 RX ORDER — DONEPEZIL HYDROCHLORIDE 10 MG/1
10 TABLET, FILM COATED ORAL AT BEDTIME
Qty: 0 | Refills: 0 | Status: DISCONTINUED | OUTPATIENT
Start: 2017-09-25 | End: 2017-09-27

## 2017-09-25 RX ORDER — HEPARIN SODIUM 5000 [USP'U]/ML
5000 INJECTION INTRAVENOUS; SUBCUTANEOUS EVERY 8 HOURS
Qty: 0 | Refills: 0 | Status: DISCONTINUED | OUTPATIENT
Start: 2017-09-25 | End: 2017-09-27

## 2017-09-25 RX ORDER — SODIUM CHLORIDE 9 MG/ML
1000 INJECTION, SOLUTION INTRAVENOUS
Qty: 0 | Refills: 0 | Status: DISCONTINUED | OUTPATIENT
Start: 2017-09-25 | End: 2017-09-25

## 2017-09-25 RX ORDER — QUETIAPINE FUMARATE 200 MG/1
25 TABLET, FILM COATED ORAL
Qty: 0 | Refills: 0 | Status: DISCONTINUED | OUTPATIENT
Start: 2017-09-25 | End: 2017-09-27

## 2017-09-25 RX ORDER — INSULIN LISPRO 100/ML
VIAL (ML) SUBCUTANEOUS
Qty: 0 | Refills: 0 | Status: DISCONTINUED | OUTPATIENT
Start: 2017-09-25 | End: 2017-09-27

## 2017-09-25 RX ORDER — SODIUM CHLORIDE 9 MG/ML
1000 INJECTION INTRAMUSCULAR; INTRAVENOUS; SUBCUTANEOUS ONCE
Qty: 0 | Refills: 0 | Status: COMPLETED | OUTPATIENT
Start: 2017-09-25 | End: 2017-09-25

## 2017-09-25 RX ORDER — ACETAMINOPHEN 500 MG
650 TABLET ORAL EVERY 6 HOURS
Qty: 0 | Refills: 0 | Status: DISCONTINUED | OUTPATIENT
Start: 2017-09-25 | End: 2017-09-25

## 2017-09-25 RX ORDER — ACETAMINOPHEN 500 MG
650 TABLET ORAL ONCE
Qty: 0 | Refills: 0 | Status: COMPLETED | OUTPATIENT
Start: 2017-09-25 | End: 2017-09-25

## 2017-09-25 RX ORDER — SERTRALINE 25 MG/1
50 TABLET, FILM COATED ORAL DAILY
Qty: 0 | Refills: 0 | Status: DISCONTINUED | OUTPATIENT
Start: 2017-09-25 | End: 2017-09-27

## 2017-09-25 RX ORDER — AMLODIPINE BESYLATE 2.5 MG/1
10 TABLET ORAL DAILY
Qty: 0 | Refills: 0 | Status: DISCONTINUED | OUTPATIENT
Start: 2017-09-25 | End: 2017-09-27

## 2017-09-25 RX ORDER — METOPROLOL TARTRATE 50 MG
5 TABLET ORAL EVERY 6 HOURS
Qty: 0 | Refills: 0 | Status: DISCONTINUED | OUTPATIENT
Start: 2017-09-25 | End: 2017-09-25

## 2017-09-25 RX ORDER — LISINOPRIL 2.5 MG/1
5 TABLET ORAL DAILY
Qty: 0 | Refills: 0 | Status: DISCONTINUED | OUTPATIENT
Start: 2017-09-25 | End: 2017-09-27

## 2017-09-25 RX ADMIN — Medication 650 MILLIGRAM(S): at 04:00

## 2017-09-25 RX ADMIN — SODIUM CHLORIDE 75 MILLILITER(S): 9 INJECTION, SOLUTION INTRAVENOUS at 07:45

## 2017-09-25 RX ADMIN — SODIUM CHLORIDE 75 MILLILITER(S): 9 INJECTION, SOLUTION INTRAVENOUS at 02:37

## 2017-09-25 RX ADMIN — Medication 260 MILLIGRAM(S): at 03:41

## 2017-09-25 RX ADMIN — Medication 1: at 21:57

## 2017-09-25 RX ADMIN — SODIUM CHLORIDE 100 MILLILITER(S): 9 INJECTION, SOLUTION INTRAVENOUS at 19:52

## 2017-09-25 RX ADMIN — QUETIAPINE FUMARATE 25 MILLIGRAM(S): 200 TABLET, FILM COATED ORAL at 21:58

## 2017-09-25 RX ADMIN — DONEPEZIL HYDROCHLORIDE 10 MILLIGRAM(S): 10 TABLET, FILM COATED ORAL at 21:57

## 2017-09-25 RX ADMIN — Medication 1: at 05:40

## 2017-09-25 RX ADMIN — MEMANTINE HYDROCHLORIDE 10 MILLIGRAM(S): 10 TABLET ORAL at 17:03

## 2017-09-25 RX ADMIN — SODIUM CHLORIDE 100 MILLILITER(S): 9 INJECTION, SOLUTION INTRAVENOUS at 15:15

## 2017-09-25 RX ADMIN — HEPARIN SODIUM 5000 UNIT(S): 5000 INJECTION INTRAVENOUS; SUBCUTANEOUS at 21:57

## 2017-09-25 RX ADMIN — SODIUM CHLORIDE 2000 MILLILITER(S): 9 INJECTION INTRAMUSCULAR; INTRAVENOUS; SUBCUTANEOUS at 10:01

## 2017-09-25 NOTE — PROGRESS NOTE ADULT - ASSESSMENT
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     Pulm: No acute issues     CV: Splenic artery pseudoaneurysm  - SBP goal of < 160 to reduce shear stress; will give metoprolol 5 q6  - IR splenic artery embolization Monday morning.   - If patient becomes hypotensive, will anticipate an emergent splenectomy     GI: Malnutrition  - NPO / IVF for IR    Renal: No acute issues  - Will consider mucomyst for renal protection prior to IR angiography     Heme: PSA with recent bleed, VTE prophylaxis  - Monitor CBC q4; transfuse for hgb < 7  - Pharmacologic VTE ppx contraindicated; mechanical VTE ppx with SCDs only    ID: No acute issues   Lines: PIV only. No indication for Hood catheter at this time.     Endo: DM   - ISS q6      Dispo: SICU; DNR  Patient discussed with SICU attending, Dr. 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     Pulm: No acute issues     CV: Splenic artery pseudoaneurysm  - SBP goal of < 160 to reduce shear stress; will give metoprolol 5 q6  - IR splenic artery embolization Monday morning.   - If patient becomes hypotensive, will anticipate an emergent splenectomy     GI: Malnutrition  - NPO / IVF for IR    Renal: No acute issues  - Will consider mucomyst for renal protection prior to IR angiography     Heme: PSA with recent bleed, VTE prophylaxis  - Monitor CBC q4; transfuse for hgb < 7  - SQH for dvt ppx    ID: No acute issues   Lines: PIV only. No indication for Hood catheter at this time.     Endo: DM   - ISS q6      Dispo: SICU until embolization, can transfer to floor if remains stable      SAADIA Baca MD PGY2  SICU Spectra: [phone]

## 2017-09-25 NOTE — PROGRESS NOTE ADULT - SUBJECTIVE AND OBJECTIVE BOX
SICU Daily Progress Note  =====================================================  Interval/Overnight Events:    Admitted from ED.     POD #       x   	SICU Day #    1    HPI:   DOREEN BROWN is a 82y woman with DM, Alzheimer's dementia (AO x 1 at baseline) who presented to [facility] with epigastric pain for one day. Per report, the patient's pain is in the upper abdomen, no dizziness or lightheadedness. The patient is without complaints at this time and is unsure why she is in the hospital. No upper or lower GI bleeding. She was diagnosed with pancreatic pseudocyst 1 year ago but has not had symptoms related to it.      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 The patient was transferred to [facility] for definitive management. SICU consulted for hemodynamic monitoring.     PAST MEDICAL HISTORY:  pancreatic pseudocyst, DM, dementia      PAST SURGICAL HISTORY: lap howard, hysterectomy, ureter cancer resection       Allergies: penicillin (Unknown)  statins (Unknown)      MEDICATIONS:   --------------------------------------------------------------------------------------  Neurologic Medications    Respiratory Medications    Cardiovascular Medications  metoprolol Injectable 5 milliGRAM(s) IV Push every 6 hours    Gastrointestinal Medications  dextrose 5% + sodium chloride 0.45%. 1000 milliLiter(s) IV Continuous <Continuous>  dextrose 5% + lactated ringers. 1000 milliLiter(s) IV Continuous <Continuous>    Genitourinary Medications    Hematologic/Oncologic Medications    Antimicrobial/Immunologic Medications    Endocrine/Metabolic Medications  insulin lispro (HumaLOG) corrective regimen sliding scale   SubCutaneous every 6 hours    Topical/Other Medications    --------------------------------------------------------------------------------------    VITAL SIGNS, INS/OUTS (last 24 hours):  --------------------------------------------------------------------------------------  ((Insert SICU Vitals/Is+Os here))***  --------------------------------------------------------------------------------------    EXAM  NEUROLOGY  RASS:   	GCS:    Exam: Alert, oriented to name and location "hospital" only. Patient unsure why she is admitted despite frequent explanations.     HEENT  Exam: Normocephalic, atraumatic, EOMI.  ***    RESPIRATORY  Exam: Lungs clear to auscultation, Normal expansion/effort. ***  Mechanical Ventilation:     CARDIOVASCULAR  Exam: S1, S2.  Regular rate and rhythm.   ***    GI/NUTRITION  Exam: Soft, NT, ND. Infraumbilical midline well-healed scar. Subxiphoid well-healed scar.   Current Diet:  NPO    VASCULAR  Exam: Extremities warm, pink, well-perfused. ***    MUSCULOSKELETAL  Exam: All extremities moving spontaneously without limitations. ***    SKIN  Exam: Good skin turgor, no skin breakdown. ***    METABOLIC/FLUIDS/ELECTROLYTES  dextrose 5% + sodium chloride 0.45%. 1000 milliLiter(s) IV Continuous <Continuous>  dextrose 5% + lactated ringers. 1000 milliLiter(s) IV Continuous <Continuous>      HEMATOLOGIC  [x] VTE Prophylaxis:   Transfusions:	[] PRBC	[] Platelets		[] FFP	[] Cryoprecipitate    INFECTIOUS DISEASE  Antimicrobials/Immunologic Medications:    Day #      of     ***    Tubes/Lines/Drains  ***  [x] Peripheral IV  [] Central Venous Line     	[] R	[] L	[] IJ	[] Fem	[] SC	Date Placed:   [] Arterial Line		[] R	[] L	[] Fem	[] Rad	[] Ax	Date Placed:   [] PICC		[] Midline		[] Mediport  [] Urinary Catheter		Date Placed:   [x] Necessity of urinary, arterial, and venous catheters discussed    LABS  --------------------------------------------------------------------------------------  ((Insert SICU Labs here))***  --------------------------------------------------------------------------------------    OTHER LABORATORY:     IMAGING STUDIES:   CXR:         --------------------------------------------------------------------------------------    Critical Care Diagnoses: SICU Daily Progress Note  =====================================================  Interval/Overnight Events:    Admitted from ED.     POD #       x   	SICU Day #    1    HPI:   DOREEN BROWN is a 82y woman with DM, Alzheimer's dementia (AO x 1 at baseline) who presented to [facility] with epigastric pain for one day. Per report, the patient's pain is in the upper abdomen, no dizziness or lightheadedness. The patient is without complaints at this time and is unsure why she is in the hospital. No upper or lower GI bleeding. She was diagnosed with pancreatic pseudocyst 1 year ago but has not had symptoms related to it.      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 The patient was transferred to [facility] for definitive management. SICU consulted for hemodynamic monitoring.     PAST MEDICAL HISTORY:  pancreatic pseudocyst, DM, dementia      PAST SURGICAL HISTORY: lap howard, hysterectomy, ureter cancer resection       Allergies: penicillin (Unknown)  statins (Unknown)      MEDICATIONS:   --------------------------------------------------------------------------------------  Neurologic Medications    Respiratory Medications    Cardiovascular Medications  metoprolol Injectable 5 milliGRAM(s) IV Push every 6 hours    Gastrointestinal Medications  dextrose 5% + sodium chloride 0.45%. 1000 milliLiter(s) IV Continuous <Continuous>  dextrose 5% + lactated ringers. 1000 milliLiter(s) IV Continuous <Continuous>    Genitourinary Medications    Hematologic/Oncologic Medications    Antimicrobial/Immunologic Medications    Endocrine/Metabolic Medications  insulin lispro (HumaLOG) corrective regimen sliding scale   SubCutaneous every 6 hours    Topical/Other Medications    --------------------------------------------------------------------------------------    VITAL SIGNS, INS/OUTS (last 24 hours):  --------------------------------------------------------------------------------------  ICU Vital Signs Last 24 Hrs  T(C): 35.7 (25 Sep 2017 08:00), Max: 36.9 (24 Sep 2017 18:50)  T(F): 96.2 (25 Sep 2017 08:00), Max: 98.4 (24 Sep 2017 18:50)  HR: 56 (25 Sep 2017 09:50) (54 - 76)  BP: 160/94 (25 Sep 2017 09:50) (97/56 - 166/69)  BP(mean): 111 (25 Sep 2017 09:00) (83 - 111)  ABP: --  ABP(mean): --  RR: 11 (25 Sep 2017 09:50) (11 - 18)  SpO2: 95% (25 Sep 2017 09:50) (82% - 100%)    I&O's Detail    24 Sep 2017 07:01  -  25 Sep 2017 07:00  --------------------------------------------------------  IN:    dextrose 5% + lactated ringers.: 525 mL    IV PiggyBack: 65 mL  Total IN: 590 mL    OUT:    Voided: 500 mL  Total OUT: 500 mL    Total NET: 90 mL      25 Sep 2017 07:01  -  25 Sep 2017 09:54  --------------------------------------------------------  IN:    dextrose 5% + lactated ringers.: 75 mL  Total IN: 75 mL    OUT:    Voided: 300 mL  Total OUT: 300 mL    Total NET: -225 mL    --------------------------------------------------------------------------------------    EXAM  NEUROLOGY  RASS:   	GCS:    Exam: Alert, oriented to name and location "hospital" only. Patient unsure why she is admitted despite frequent explanations.     HEENT  Exam: Normocephalic, atraumatic, EOMI.      RESPIRATORY  Exam: Lungs clear to auscultation, Normal expansion/effort.  Mechanical Ventilation:     CARDIOVASCULAR  Exam: S1, S2.  Regular rate and rhythm.       GI/NUTRITION  Exam: Soft, NT, ND. Infraumbilical midline well-healed scar. Subxiphoid well-healed scar.   Current Diet:  NPO    VASCULAR  Exam: Extremities warm, pink, well-perfused.    MUSCULOSKELETAL  Exam: All extremities moving spontaneously without limitations.    SKIN  Exam: Good skin turgor, no skin breakdown.     METABOLIC/FLUIDS/ELECTROLYTES  dextrose 5% + sodium chloride 0.45%. 1000 milliLiter(s) IV Continuous <Continuous>  dextrose 5% + lactated ringers. 1000 milliLiter(s) IV Continuous <Continuous>      HEMATOLOGIC  [x] VTE Prophylaxis:   Transfusions:	[] PRBC	[] Platelets		[] FFP	[] Cryoprecipitate    INFECTIOUS DISEASE  Antimicrobials/Immunologic Medications:      Tubes/Lines/Drains  ***  [x] Peripheral IV  [] Central Venous Line     	[] R	[] L	[] IJ	[] Fem	[] SC	Date Placed:   [] Arterial Line		[] R	[] L	[] Fem	[] Rad	[] Ax	Date Placed:   [] PICC		[] Midline		[] Mediport  [] Urinary Catheter		Date Placed:   [x] Necessity of urinary, arterial, and venous catheters discussed    LABS  --------------------------------------------------------------------------------------                        9.4    5.12  )-----------( 188      ( 25 Sep 2017 08:23 )             31.7     09-25    136  |  97<L>  |  15  ----------------------------<  200<H>  4.4   |  28  |  0.92    Ca    9.5      25 Sep 2017 02:10  Phos  3.0     09-25  Mg     2.0     09-25    TPro  7.0  /  Alb  3.4  /  TBili  0.2  /  DBili  x   /  AST  22  /  ALT  18  /  AlkPhos  80  09-24    --------------------------------------------------------------------------------------

## 2017-09-25 NOTE — PROGRESS NOTE ADULT - SUBJECTIVE AND OBJECTIVE BOX
IR PRE Procedure NOTE :       Patient is a 82y old  Female who presents with a chief complaint of 1 day of abdominal pain, found to have splenic artery pseudoaneurysm which recently bled. (24 Sep 2017 21:42)      SUBJECTIVE: pain controlled, no nausea/vomiting/headache/dizziness/chest pain/shortness of breath    OBJECTIVE:  PHYSICAL EXAM:  GA: NAD  Abdomen:  -  soft, non-distended, mild tenderness      Vitals/Labs:  Vital Signs Last 24 Hrs  T(C): 35.7 (25 Sep 2017 08:00), Max: 36.9 (24 Sep 2017 18:50)  T(F): 96.2 (25 Sep 2017 08:00), Max: 98.4 (24 Sep 2017 18:50)  HR: 56 (25 Sep 2017 09:50) (54 - 76)  BP: 160/94 (25 Sep 2017 09:50) (97/56 - 166/69)  BP(mean): 111 (25 Sep 2017 09:00) (83 - 111)  RR: 11 (25 Sep 2017 09:50) (11 - 18)  SpO2: 95% (25 Sep 2017 09:50) (82% - 100%)    I&O's Detail    24 Sep 2017 07:01  -  25 Sep 2017 07:00  --------------------------------------------------------  IN:    dextrose 5% + lactated ringers.: 525 mL    IV PiggyBack: 65 mL  Total IN: 590 mL    OUT:    Voided: 500 mL  Total OUT: 500 mL    Total NET: 90 mL      25 Sep 2017 07:01  -  25 Sep 2017 09:52  --------------------------------------------------------  IN:    dextrose 5% + lactated ringers.: 75 mL  Total IN: 75 mL    OUT:    Voided: 300 mL  Total OUT: 300 mL    Total NET: -225 mL                                9.4    5.12  )-----------( 188      ( 25 Sep 2017 08:23 )             31.7       09-25    136  |  97<L>  |  15  ----------------------------<  200<H>  4.4   |  28  |  0.92    Ca    9.5      25 Sep 2017 02:10  Phos  3.0     09-25  Mg     2.0     09-25    TPro  7.0  /  Alb  3.4  /  TBili  0.2  /  DBili  x   /  AST  22  /  ALT  18  /  AlkPhos  80  09-24      CAPILLARY BLOOD GLUCOSE  190 (25 Sep 2017 06:00)  198 (25 Sep 2017 02:00)          LIVER FUNCTIONS - ( 24 Sep 2017 19:25 )  Alb: 3.4 g/dL / Pro: 7.0 g/dL / ALK PHOS: 80 u/L / ALT: 18 u/L / AST: 22 u/L / GGT: x             PT/INR - ( 25 Sep 2017 02:10 )   PT: 11.0 SEC;   INR: 0.98          PTT - ( 25 Sep 2017 02:10 )  PTT:32.5 SEC        ASSESSMENT/PLAN: ADA STEPHANIE 82y Female going for IR   embolization today   - Diet: NPO  - labs reviewed    [facility]  [id number]

## 2017-09-25 NOTE — PROGRESS NOTE ADULT - SUBJECTIVE AND OBJECTIVE BOX
Vascular Surgery Progress Note    S: Patient resting in bed. Confused and trying to leave.  Wants to go home.    T(C): 35.7 (09-25-17 @ 08:00), Max: 36.9 (09-24-17 @ 18:50)  HR: 52 (09-25-17 @ 10:00) (52 - 76)  BP: 150/64 (09-25-17 @ 10:00) (97/56 - 166/69)  RR: 13 (09-25-17 @ 10:00) (11 - 18)  SpO2: 100% (09-25-17 @ 10:00) (82% - 100%)  Wt(kg): --    09-24 @ 07:01  -  09-25 @ 07:00  --------------------------------------------------------  IN:    dextrose 5% + lactated ringers.: 525 mL    IV PiggyBack: 65 mL  Total IN: 590 mL    OUT:    Voided: 500 mL  Total OUT: 500 mL    Total NET: 90 mL      09-25 @ 07:01  -  09-25 @ 11:06  --------------------------------------------------------  IN:    dextrose 5% + lactated ringers.: 225 mL    IV PiggyBack: 1000 mL  Total IN: 1225 mL    OUT:    Voided: 700 mL  Total OUT: 700 mL    Total NET: 525 mL                          9.4    5.12  )-----------( 188      ( 25 Sep 2017 08:23 )             31.7     CAPILLARY BLOOD GLUCOSE  190 (25 Sep 2017 06:00)  198 (25 Sep 2017 02:00)          PE:   Gen: Disoriented, confused   Abd: Soft, NT, ND  Extremities: Femoral and popliteal pulses intact bilaterally.  No evidence of additional aneurysms       A/P: 82yFemale w/ splenic artery pseudoaneurysm stable   - IR today for splenic artery embolization.   - continue to monitor H/H   - primary care per SICU   - dispo once stable

## 2017-09-25 NOTE — GOALS OF CARE CONVERSATION - PERSONAL ADVANCE DIRECTIVE - TREATMENT GUIDELINE COMMENT
Telephone discussion with son and daughter-in-law to clarify patient's goals of care. If the patient were to experience cardiopulmonary arrest, her son and daughter-in-law expressed that they would not want CPR to be initiated. However, they do assert that they want her DNR status to be rescinded during her interventional radiology or surgical procedure. Family was given the opportunity to ask questions and their questions were answered.

## 2017-09-26 ENCOUNTER — TRANSCRIPTION ENCOUNTER (OUTPATIENT)
Age: 82
End: 2017-09-26

## 2017-09-26 LAB
APTT BLD: 34.7 SEC — SIGNIFICANT CHANGE UP (ref 27.5–37.4)
BUN SERPL-MCNC: 7 MG/DL — SIGNIFICANT CHANGE UP (ref 7–23)
CALCIUM SERPL-MCNC: 8.9 MG/DL — SIGNIFICANT CHANGE UP (ref 8.4–10.5)
CHLORIDE SERPL-SCNC: 98 MMOL/L — SIGNIFICANT CHANGE UP (ref 98–107)
CO2 SERPL-SCNC: 24 MMOL/L — SIGNIFICANT CHANGE UP (ref 22–31)
CREAT SERPL-MCNC: 0.79 MG/DL — SIGNIFICANT CHANGE UP (ref 0.5–1.3)
GLUCOSE SERPL-MCNC: 187 MG/DL — HIGH (ref 70–99)
HCT VFR BLD CALC: 30.7 % — LOW (ref 34.5–45)
HGB BLD-MCNC: 9 G/DL — LOW (ref 11.5–15.5)
INR BLD: 1.03 — SIGNIFICANT CHANGE UP (ref 0.88–1.17)
MAGNESIUM SERPL-MCNC: 1.9 MG/DL — SIGNIFICANT CHANGE UP (ref 1.6–2.6)
MCHC RBC-ENTMCNC: 28.2 PG — SIGNIFICANT CHANGE UP (ref 27–34)
MCHC RBC-ENTMCNC: 29.3 % — LOW (ref 32–36)
MCV RBC AUTO: 96.2 FL — SIGNIFICANT CHANGE UP (ref 80–100)
NRBC # FLD: 0 — SIGNIFICANT CHANGE UP
PHOSPHATE SERPL-MCNC: 3.1 MG/DL — SIGNIFICANT CHANGE UP (ref 2.5–4.5)
PLATELET # BLD AUTO: 252 K/UL — SIGNIFICANT CHANGE UP (ref 150–400)
PMV BLD: 11.7 FL — SIGNIFICANT CHANGE UP (ref 7–13)
POTASSIUM SERPL-MCNC: 3.6 MMOL/L — SIGNIFICANT CHANGE UP (ref 3.5–5.3)
POTASSIUM SERPL-SCNC: 3.6 MMOL/L — SIGNIFICANT CHANGE UP (ref 3.5–5.3)
PROTHROM AB SERPL-ACNC: 11.5 SEC — SIGNIFICANT CHANGE UP (ref 9.8–13.1)
RBC # BLD: 3.19 M/UL — LOW (ref 3.8–5.2)
RBC # FLD: 15.5 % — HIGH (ref 10.3–14.5)
SODIUM SERPL-SCNC: 140 MMOL/L — SIGNIFICANT CHANGE UP (ref 135–145)
WBC # BLD: 4.8 K/UL — SIGNIFICANT CHANGE UP (ref 3.8–10.5)
WBC # FLD AUTO: 4.8 K/UL — SIGNIFICANT CHANGE UP (ref 3.8–10.5)

## 2017-09-26 PROCEDURE — 99231 SBSQ HOSP IP/OBS SF/LOW 25: CPT

## 2017-09-26 RX ORDER — POTASSIUM CHLORIDE 20 MEQ
40 PACKET (EA) ORAL ONCE
Qty: 0 | Refills: 0 | Status: COMPLETED | OUTPATIENT
Start: 2017-09-26 | End: 2017-09-26

## 2017-09-26 RX ADMIN — Medication 4: at 22:40

## 2017-09-26 RX ADMIN — Medication 2: at 18:16

## 2017-09-26 RX ADMIN — MEMANTINE HYDROCHLORIDE 10 MILLIGRAM(S): 10 TABLET ORAL at 05:04

## 2017-09-26 RX ADMIN — QUETIAPINE FUMARATE 25 MILLIGRAM(S): 200 TABLET, FILM COATED ORAL at 17:43

## 2017-09-26 RX ADMIN — AMLODIPINE BESYLATE 10 MILLIGRAM(S): 2.5 TABLET ORAL at 05:04

## 2017-09-26 RX ADMIN — SERTRALINE 50 MILLIGRAM(S): 25 TABLET, FILM COATED ORAL at 12:14

## 2017-09-26 RX ADMIN — HEPARIN SODIUM 5000 UNIT(S): 5000 INJECTION INTRAVENOUS; SUBCUTANEOUS at 14:02

## 2017-09-26 RX ADMIN — Medication 1: at 14:02

## 2017-09-26 RX ADMIN — HEPARIN SODIUM 5000 UNIT(S): 5000 INJECTION INTRAVENOUS; SUBCUTANEOUS at 05:04

## 2017-09-26 RX ADMIN — HEPARIN SODIUM 5000 UNIT(S): 5000 INJECTION INTRAVENOUS; SUBCUTANEOUS at 21:08

## 2017-09-26 RX ADMIN — QUETIAPINE FUMARATE 25 MILLIGRAM(S): 200 TABLET, FILM COATED ORAL at 05:04

## 2017-09-26 RX ADMIN — DONEPEZIL HYDROCHLORIDE 10 MILLIGRAM(S): 10 TABLET, FILM COATED ORAL at 21:08

## 2017-09-26 RX ADMIN — Medication 40 MILLIEQUIVALENT(S): at 07:43

## 2017-09-26 RX ADMIN — LISINOPRIL 5 MILLIGRAM(S): 2.5 TABLET ORAL at 05:04

## 2017-09-26 RX ADMIN — SODIUM CHLORIDE 100 MILLILITER(S): 9 INJECTION, SOLUTION INTRAVENOUS at 01:05

## 2017-09-26 RX ADMIN — Medication 3: at 09:23

## 2017-09-26 RX ADMIN — MEMANTINE HYDROCHLORIDE 10 MILLIGRAM(S): 10 TABLET ORAL at 17:43

## 2017-09-26 NOTE — DISCHARGE NOTE ADULT - HOSPITAL COURSE
This is a 82 year old female with history of DM2, pancreatic pseudocyst, dementia transferred by ambulance from [facility] for abdominal pain that started last night, found to have splenic artery pseudoaneurysm which  recently  bled. She was sent for evaluation by vascular surgery. Now she has no complaints although she is poor historian with dementia.  History obtained from Son (Mr. Decker). Patient is AOx1 at baseline. She lives in assisted living. She was recently hospitalized at [facility] and diagnosed with DM. Her pain is in the upper abdomen, no dizziness or lightheadedness. No upper or lower GI bleeding. She was diagnosed with pancreatic pseudocyst 1 year ago but has not had symptoms related to it. Patient was admitted to the surgical intensive care unit for close hemodynamic monitoring. On HOD1 patient went to intervention radiology for embolization of the splenic artery aneurysm. She returned to the surgical intensive care unit for serial labs and monitoring. Her hemoglobin and hematocrit remained stable, she remained pain free and was transferred to the surgical floor. At the time of discharge, the patient was hemodynamically stable, was tolerating PO diet, was voiding urine and passing stool, and was comfortable with adequate pain control. The patient was instructed to follow up with Dr. Leung within 1-2 weeks after discharge from the hospital as well as follow up with her primary care physician for blood work. The patient & family felt comfortable with discharge. The patient was discharged to assisted living. The patient had no other issues. This is a 82 year old female with history of DM2, pancreatic pseudocyst, dementia transferred by ambulance from [place] for abdominal pain that started last night, found to have splenic artery pseudoaneurysm which  recently  bled. She was sent for evaluation by vascular surgery. Now she has no complaints although she is poor historian with dementia.  History obtained from Son (Mr. Decker). Patient is AOx1 at baseline. She lives in assisted living. She was recently hospitalized at [facility] and diagnosed with DM. Her pain is in the upper abdomen, no dizziness or lightheadedness. No upper or lower GI bleeding. She was diagnosed with pancreatic pseudocyst 1 year ago but has not had symptoms related to it. Patient was admitted to the surgical intensive care unit for close hemodynamic monitoring. On HOD1 patient went to intervention radiology for embolization of the splenic artery aneurysm. She returned to the surgical intensive care unit for serial labs and monitoring. Her hemoglobin and hematocrit remained stable, she remained pain free and was transferred to the surgical floor. At the time of discharge, the patient was hemodynamically stable, was tolerating PO diet, was voiding urine and passing stool, and was comfortable with adequate pain control. The patient was instructed to follow up with Dr. Leung within 1-2 weeks after discharge from the hospital as well as follow up with her primary care physician for blood work. The patient & family felt comfortable with discharge. The patient was discharged to assisted living. The patient had no other issues. Psychiatry called to evaulate patient due to her dementia and found her to be at baseline, oriented x 1. They also found her to be stable for discharge back to her facility.

## 2017-09-26 NOTE — PROGRESS NOTE ADULT - SUBJECTIVE AND OBJECTIVE BOX
Vascular Surgery Post-Op Note    Pre-Op Dx: splenic artery pseudoaneurysm   Procedure: IR embolization splenic artery    Subjective:   Pt seen and examined at the bedside. Pt w/ no complaints. She is not oriented at baseline.    Vital Signs Last 24 Hrs  T(C): 36.6 (25 Sep 2017 23:13), Max: 36.6 (25 Sep 2017 23:13)  T(F): 97.8 (25 Sep 2017 23:13), Max: 97.8 (25 Sep 2017 23:13)  HR: 64 (25 Sep 2017 23:13) (52 - 70)  BP: 105/55 (25 Sep 2017 23:13) (105/55 - 170/67)  BP(mean): 93 (25 Sep 2017 20:00) (76 - 111)  RR: 19 (25 Sep 2017 23:13) (11 - 19)  SpO2: 99% (25 Sep 2017 23:13) (82% - 100%)    Physical Exam:  General: NAD, resting comfortably in bed  Pulmonary: Nonlabored breathing, no respiratory distress  Abdominal: soft, NT, ND  Incision: groin incision C/D/I, no hematoma, erythema, or induration appreciated.      LABS:                        9.3    4.85  )-----------( 243      ( 25 Sep 2017 14:45 )             31.7     09-25    136  |  97<L>  |  15  ----------------------------<  200<H>  4.4   |  28  |  0.92    Ca    9.5      25 Sep 2017 02:10  Phos  3.0     09-25  Mg     2.0     09-25    TPro  7.0  /  Alb  3.4  /  TBili  0.2  /  DBili  x   /  AST  22  /  ALT  18  /  AlkPhos  80  09-24    PT/INR - ( 25 Sep 2017 02:10 )   PT: 11.0 SEC;   INR: 0.98          PTT - ( 25 Sep 2017 02:10 )  PTT:32.5 SEC  CAPILLARY BLOOD GLUCOSE  204 (25 Sep 2017 23:13)  199 (25 Sep 2017 22:00)  144 (25 Sep 2017 15:00)  190 (25 Sep 2017 06:00)  198 (25 Sep 2017 02:00)      LIVER FUNCTIONS - ( 24 Sep 2017 19:25 )  Alb: 3.4 g/dL / Pro: 7.0 g/dL / ALK PHOS: 80 u/L / ALT: 18 u/L / AST: 22 u/L / GGT: x             Assessment:82y Female s/p IR embolization for splenic artery pseudoaneurysm, doing well  Plan:  -continue CLD  -DVT ppx  -pain control  -moniter H/H  -dispo back to nursing home in AM if patient remains stable

## 2017-09-26 NOTE — DISCHARGE NOTE ADULT - PLAN OF CARE
s/p splenic artery embolization, recover after procedure, return to baseline WOUND CARE:   BATHING: Please do not submerge wound underwater. You may shower and/or sponge bathe.  ACTIVITY: No heavy lifting or straining. Otherwise, you may return to your usual level of physical activity. If you are taking narcotic pain medication (such as Percocet), do NOT drive a car, operate machinery or make important decisions.  DIET: Return to your usual diet.  NOTIFY YOUR SURGEON IF: You have any bleeding that does not stop, any pus draining from your wound, any fever (over 100.4 F) or chills, persistent nausea/vomiting, persistent diarrhea, or if your pain is not controlled on your discharge pain medications.  FOLLOW-UP:  1. Please follow up with your primary care physician in one week regarding your hospitalization

## 2017-09-26 NOTE — DISCHARGE NOTE ADULT - PATIENT PORTAL LINK FT
“You can access the Follow[employer] Patient Portal, offered by [facility], by registering with the following website: [email or web address]”

## 2017-09-26 NOTE — DISCHARGE NOTE ADULT - CARE PROVIDERS DIRECT ADDRESSES
[email or web address]

## 2017-09-26 NOTE — DISCHARGE NOTE ADULT - MEDICATION SUMMARY - MEDICATIONS TO TAKE
I will START or STAY ON the medications listed below when I get home from the hospital:    ramipril 5 mg oral tablet  -- 1 tab(s) by mouth once a day  -- Indication: For Hypertension    sertraline 50 mg oral tablet  -- 1 tab(s) by mouth once a day  -- Indication: For Antidepressant    Levemir 100 units/mL subcutaneous solution  -- 15 unit(s) subcutaneous once a day  -- Indication: For Diabetes    Zetia 10 mg oral tablet  -- 1 tab(s) by mouth once a day  -- Indication: For Hyperlipidemia    SEROquel 25 mg oral tablet  -- 1 tab(s) by mouth 2 times a day  -- Indication: For Dementia    amLODIPine 10 mg oral tablet  -- 1 tab(s) by mouth once a day  -- Indication: For Hypertension    Aricept 10 mg oral tablet  -- 1 tab(s) by mouth once a day (at bedtime)  -- Indication: For Dementia    Namenda 10 mg oral tablet  -- 1 tab(s) by mouth 2 times a day  -- Indication: For Dementia

## 2017-09-26 NOTE — DISCHARGE NOTE ADULT - ADDITIONAL INSTRUCTIONS
WOUND CARE:   BATHING: Please do not submerge wound underwater. You may shower and/or sponge bathe.  ACTIVITY: No heavy lifting or straining. Otherwise, you may return to your usual level of physical activity. If you are taking narcotic pain medication (such as Percocet), do NOT drive a car, operate machinery or make important decisions.  DIET: Return to your usual diet.  NOTIFY YOUR SURGEON IF: You have any bleeding that does not stop, any pus draining from your wound, any fever (over 100.4 F) or chills, persistent nausea/vomiting, persistent diarrhea, or if your pain is not controlled on your discharge pain medications.  FOLLOW-UP:  1. Please follow up with your primary care physician in one week regarding your hospitalization.

## 2017-09-26 NOTE — DISCHARGE NOTE ADULT - CARE PLAN
Principal Discharge DX:	Splenic artery aneurysm  Goal:	s/p splenic artery embolization, recover after procedure, return to baseline  Instructions for follow-up, activity and diet:	WOUND CARE:   BATHING: Please do not submerge wound underwater. You may shower and/or sponge bathe.  ACTIVITY: No heavy lifting or straining. Otherwise, you may return to your usual level of physical activity. If you are taking narcotic pain medication (such as Percocet), do NOT drive a car, operate machinery or make important decisions.  DIET: Return to your usual diet.  NOTIFY YOUR SURGEON IF: You have any bleeding that does not stop, any pus draining from your wound, any fever (over 100.4 F) or chills, persistent nausea/vomiting, persistent diarrhea, or if your pain is not controlled on your discharge pain medications.  FOLLOW-UP:  1. Please follow up with your primary care physician in one week regarding your hospitalization

## 2017-09-26 NOTE — DISCHARGE NOTE ADULT - CARE PROVIDER_API CALL
Summer Leung), Surgery  [address]  [address]  [place] [address]  Phone: [phone]  Fax: [phone]

## 2017-09-26 NOTE — DISCHARGE NOTE ADULT - OTHER SIGNIFICANT FINDINGS
Patient may be given Levimir dose during daytime, as long as the medication is given at the same time each day.

## 2017-09-27 VITALS
DIASTOLIC BLOOD PRESSURE: 69 MMHG | SYSTOLIC BLOOD PRESSURE: 118 MMHG | RESPIRATION RATE: 16 BRPM | HEART RATE: 79 BPM | OXYGEN SATURATION: 99 % | TEMPERATURE: 98 F

## 2017-09-27 DIAGNOSIS — F03.90 UNSPECIFIED DEMENTIA WITHOUT BEHAVIORAL DISTURBANCE: ICD-10-CM

## 2017-09-27 PROCEDURE — 90792 PSYCH DIAG EVAL W/MED SRVCS: CPT

## 2017-09-27 PROCEDURE — 99231 SBSQ HOSP IP/OBS SF/LOW 25: CPT

## 2017-09-27 RX ADMIN — HEPARIN SODIUM 5000 UNIT(S): 5000 INJECTION INTRAVENOUS; SUBCUTANEOUS at 05:43

## 2017-09-27 RX ADMIN — QUETIAPINE FUMARATE 25 MILLIGRAM(S): 200 TABLET, FILM COATED ORAL at 05:43

## 2017-09-27 RX ADMIN — LISINOPRIL 5 MILLIGRAM(S): 2.5 TABLET ORAL at 05:43

## 2017-09-27 RX ADMIN — Medication 3: at 11:31

## 2017-09-27 RX ADMIN — MEMANTINE HYDROCHLORIDE 10 MILLIGRAM(S): 10 TABLET ORAL at 05:43

## 2017-09-27 RX ADMIN — SERTRALINE 50 MILLIGRAM(S): 25 TABLET, FILM COATED ORAL at 11:31

## 2017-09-27 RX ADMIN — AMLODIPINE BESYLATE 10 MILLIGRAM(S): 2.5 TABLET ORAL at 05:43

## 2017-09-27 RX ADMIN — Medication 3: at 14:03

## 2017-09-27 NOTE — BEHAVIORAL HEALTH ASSESSMENT NOTE - NSBHCONSULTFOLLOWAFTERCARE_PSY_A_CORE FT
Patient to be discharged back to assisted living facility Patient is psychiatrically stable to be discharged back to assisted living facility today

## 2017-09-27 NOTE — BEHAVIORAL HEALTH ASSESSMENT NOTE - NSBHCHARTREVIEWVS_PSY_A_CORE FT
Vital Signs Last 24 Hrs  T(C): 36 (27 Sep 2017 10:50), Max: 37.3 (26 Sep 2017 13:58)  T(F): 96.8 (27 Sep 2017 10:50), Max: 99.2 (26 Sep 2017 13:58)  HR: 73 (27 Sep 2017 10:50) (61 - 86)  BP: 112/63 (27 Sep 2017 10:50) (106/67 - 141/76)  BP(mean): --  RR: 18 (27 Sep 2017 10:50) (17 - 18)  SpO2: 99% (27 Sep 2017 10:50) (96% - 100%)

## 2017-09-27 NOTE — BEHAVIORAL HEALTH ASSESSMENT NOTE - HPI (INCLUDE ILLNESS QUALITY, SEVERITY, DURATION, TIMING, CONTEXT, MODIFYING FACTORS, ASSOCIATED SIGNS AND SYMPTOMS)
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 She was sent for evaluation by vascular surgery. Patient seen post IR embolization for splenic artery pseudoaneurysm. Psych consulted to evaluate dementia.  On interview, patient is A+O x 1 and calm. States that she does not remember why she is in the hospital. States that she needs to go home to take care of her grandmother. Denies any past psych history or substance abuse. Denies hallucinations/delusions/psychosis symptoms. Denies SI/HI. 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 She was sent for evaluation by vascular surgery. Patient seen post IR embolization for splenic artery pseudoaneurysm. Psych consulted to evaluate dementia.  On interview, patient is A+O x 1 (to person only) and calm. States that she does not remember why she is in the hospital. States that she needs to go home to take care of her grandmother. Denies any past psych history or substance abuse. Denies hallucinations/delusions/psychosis symptoms. Denies SI/HI. Pt has cooperated with her treatment here and has not required any prn medication for agitation, she has been calm.

## 2017-09-27 NOTE — PROGRESS NOTE ADULT - ASSESSMENT
82y Female s/p IR embolization for splenic artery pseudoaneurysm, doing well    Plan:  -pt remains stable; d/c to nursing home today  -DVT ppx  -pain control

## 2017-09-27 NOTE — BEHAVIORAL HEALTH ASSESSMENT NOTE - NSBHCHARTREVIEWLAB_PSY_A_CORE FT
CBC Full  -  ( 26 Sep 2017 06:00 )  WBC Count : 4.80 K/uL  Hemoglobin : 9.0 g/dL  Hematocrit : 30.7 %  Platelet Count - Automated : 252 K/uL  Mean Cell Volume : 96.2 fL  Mean Cell Hemoglobin : 28.2 pg  Mean Cell Hemoglobin Concentration : 29.3 %  Auto Neutrophil # : x  Auto Lymphocyte # : x  Auto Monocyte # : x  Auto Eosinophil # : x  Auto Basophil # : x  Auto Neutrophil % : x  Auto Lymphocyte % : x  Auto Monocyte % : x  Auto Eosinophil % : x  Auto Basophil % : x  09-26    140  |  98  |  7   ----------------------------<  187<H>  3.6   |  24  |  0.79    Ca    8.9      26 Sep 2017 06:00  Phos  3.1     09-26  Mg     1.9     09-26

## 2017-09-27 NOTE — PROGRESS NOTE ADULT - SUBJECTIVE AND OBJECTIVE BOX
Surgery C Team (Vascular) Progress Note    Subjective:  No acute overnight events.  Patient examined at bedside resting.  No complaints at this time.    Objective:  Vital Signs Last 24 Hrs  T(C): 36.9 (27 Sep 2017 05:42), Max: 37.3 (26 Sep 2017 13:58)  T(F): 98.4 (27 Sep 2017 05:42), Max: 99.2 (26 Sep 2017 13:58)  HR: 61 (27 Sep 2017 05:42) (56 - 86)  BP: 126/73 (27 Sep 2017 05:42) (106/67 - 141/76)  RR: 17 (27 Sep 2017 05:42) (17 - 18)  SpO2: 100% (27 Sep 2017 05:42) (96% - 100%)    Labs                        9.0    4.80  )-----------( 252      ( 26 Sep 2017 06:00 )             30.7       09-26    140  |  98  |  7   ----------------------------<  187<H>  3.6   |  24  |  0.79    Ca    8.9      26 Sep 2017 06:00  Phos  3.1     09-26  Mg     1.9     09-26      I&O's Detail    26 Sep 2017 07:01  -  27 Sep 2017 07:00  --------------------------------------------------------  IN:    Oral Fluid: 100 mL  Total IN: 100 mL    OUT:    Voided: 400 mL  Total OUT: 400 mL    Total NET: -300 mL    Focused Physical Exam:  General: NAD  Resp: Nonlabored breathing  Extremities: groin incision C/D/I, no hematoma, erythema, or induration appreciated.

## 2017-09-27 NOTE — BEHAVIORAL HEALTH ASSESSMENT NOTE - SUMMARY
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 She was sent for evaluation by vascular surgery. Patient seen post IR embolization for splenic artery pseudoaneurysm. Psych consulted to evaluate dementia.  On interview, patient is A+O x 1 and calm. States that she does not remember why she is in the hospital. States that she needs to go home to take care of her grandmother. Reports that her appetite is good and she slept well. Denies any past psych history or substance abuse. Denies hallucinations/delusions/psychosis symptoms. Denies SI/HI. 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 She was sent for evaluation by vascular surgery. Patient seen post IR embolization for splenic artery pseudoaneurysm. Psych consulted to evaluate dementia. She has been calm during this hospital admission without requiring any prns.   On interview, patient is A+O x 1 and calm. States that she does not remember why she is in the hospital. States that she needs to go home to take care of her grandmother. Reports that her appetite is good and she slept well. Denies any past psych history or substance abuse. Denies hallucinations/delusions/psychosis symptoms. Denies SI/HI.

## 2017-09-27 NOTE — PROGRESS NOTE ADULT - ATTENDING COMMENTS
Pt. was seen and examined. Agree with above. Plan d/w the team.  Hemodynamically stable. No acute events overnight.   On abd exam: abd soft, nt, nd  Hg 9 stable  Pt is pending angio and embolization by IR  will continue to monitor H&H
Pt. was seen and examined. Agree with above. Plan d/w the team.  s/p splenic art embolization  no acute events overnight  hemodynamically stable, no abd pain   tolerating diet  Hg stable  awaiting return to nursing facility.
Pt. was seen and examined. Agree with above. Plan d/w the team.  no acute events overnight  hemodynamically stable, no abd pain   tolerating diet  Hg stable  awaiting return to nursing facility.
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     Pulm: No acute issues     CV: Splenic artery pseudoaneurysm  - SBP goal of < 160 to reduce shear stress; will give metoprolol 5 q6  - IR splenic artery embolization Monday morning.   - If patient becomes hypotensive, will anticipate an emergent splenectomy     GI: Malnutrition  - NPO / IVF for IR    Renal: No acute issues  - Will consider mucomyst for renal protection prior to IR angiography     Heme: PSA with recent bleed, VTE prophylaxis  - Monitor CBC q4; transfuse for hgb < 7  - SQH for dvt ppx    ID: No acute issues   Lines: PIV only. No indication for Hood catheter at this time.     Endo: DM   - ISS q6      Dispo: SICU until embolization, can transfer to floor if remains stable      The patient is a critical care patient with hemodynamic and metabolic instability in SICU.  I have personally interviewed and examined this patient, reviewed labs and x-rays, discussed with other consultants, House staff and PA's.  I spent   66  minutes  in total providing critical care for the diagnoses, assessment and plan above.  These diagnoses are unrelated to the surgical procedure noted above.  I met with family     min to get further history and make care decisions for this patient who is unable to participate due to altered mental status.  Time involved in performance of separately billable procedures was not counted toward my critical care time.  There is no overlap.

## 2017-10-19 PROCEDURE — 87040 BLOOD CULTURE FOR BACTERIA: CPT

## 2017-10-19 PROCEDURE — 82962 GLUCOSE BLOOD TEST: CPT

## 2017-10-19 PROCEDURE — 85730 THROMBOPLASTIN TIME PARTIAL: CPT

## 2017-10-19 PROCEDURE — 93005 ELECTROCARDIOGRAM TRACING: CPT

## 2017-10-19 PROCEDURE — 74176 CT ABD & PELVIS W/O CONTRAST: CPT

## 2017-10-19 PROCEDURE — 87086 URINE CULTURE/COLONY COUNT: CPT

## 2017-10-19 PROCEDURE — 83036 HEMOGLOBIN GLYCOSYLATED A1C: CPT

## 2017-10-19 PROCEDURE — 83735 ASSAY OF MAGNESIUM: CPT

## 2017-10-19 PROCEDURE — 83690 ASSAY OF LIPASE: CPT

## 2017-10-19 PROCEDURE — 80048 BASIC METABOLIC PNL TOTAL CA: CPT

## 2017-10-19 PROCEDURE — 85610 PROTHROMBIN TIME: CPT

## 2017-10-19 PROCEDURE — 71045 X-RAY EXAM CHEST 1 VIEW: CPT

## 2017-10-19 PROCEDURE — 82272 OCCULT BLD FECES 1-3 TESTS: CPT

## 2017-10-19 PROCEDURE — 80053 COMPREHEN METABOLIC PANEL: CPT

## 2017-10-19 PROCEDURE — 82009 KETONE BODYS QUAL: CPT

## 2017-10-19 PROCEDURE — 96361 HYDRATE IV INFUSION ADD-ON: CPT

## 2017-10-19 PROCEDURE — 83520 IMMUNOASSAY QUANT NOS NONAB: CPT

## 2017-10-19 PROCEDURE — 74174 CTA ABD&PLVS W/CONTRAST: CPT

## 2017-10-19 PROCEDURE — 99285 EMERGENCY DEPT VISIT HI MDM: CPT | Mod: 25

## 2017-10-19 PROCEDURE — 85027 COMPLETE CBC AUTOMATED: CPT

## 2017-10-19 PROCEDURE — 83605 ASSAY OF LACTIC ACID: CPT

## 2017-10-19 PROCEDURE — 96374 THER/PROPH/DIAG INJ IV PUSH: CPT

## 2017-10-19 PROCEDURE — 82803 BLOOD GASES ANY COMBINATION: CPT

## 2017-10-19 PROCEDURE — 82150 ASSAY OF AMYLASE: CPT

## 2017-10-19 PROCEDURE — 84484 ASSAY OF TROPONIN QUANT: CPT

## 2017-10-19 PROCEDURE — 85025 COMPLETE CBC W/AUTO DIFF WBC: CPT

## 2017-10-19 PROCEDURE — 86900 BLOOD TYPING SEROLOGIC ABO: CPT

## 2017-10-19 PROCEDURE — 84100 ASSAY OF PHOSPHORUS: CPT

## 2017-10-19 PROCEDURE — 86850 RBC ANTIBODY SCREEN: CPT

## 2017-10-19 PROCEDURE — 81003 URINALYSIS AUTO W/O SCOPE: CPT

## 2018-08-23 ENCOUNTER — INPATIENT (INPATIENT)
Facility: HOSPITAL | Age: 83
LOS: 7 days | Discharge: NURSING, REHAB OR LONG-TERM CARE | DRG: 871 | End: 2018-08-31
Attending: INTERNAL MEDICINE | Admitting: INTERNAL MEDICINE
Payer: MEDICARE

## 2018-08-23 VITALS
WEIGHT: 123.46 LBS | DIASTOLIC BLOOD PRESSURE: 69 MMHG | SYSTOLIC BLOOD PRESSURE: 93 MMHG | TEMPERATURE: 99 F | OXYGEN SATURATION: 99 % | RESPIRATION RATE: 16 BRPM | HEART RATE: 77 BPM

## 2018-08-23 LAB
ACETONE SERPL-MCNC: NEGATIVE — SIGNIFICANT CHANGE UP
ALBUMIN SERPL ELPH-MCNC: 3.7 G/DL — SIGNIFICANT CHANGE UP (ref 3.3–5)
ALP SERPL-CCNC: 105 U/L — SIGNIFICANT CHANGE UP (ref 40–120)
ALT FLD-CCNC: 22 U/L DA — SIGNIFICANT CHANGE UP (ref 10–45)
ANION GAP SERPL CALC-SCNC: 9 MMOL/L — SIGNIFICANT CHANGE UP (ref 5–17)
APPEARANCE UR: ABNORMAL
AST SERPL-CCNC: 25 U/L — SIGNIFICANT CHANGE UP (ref 10–40)
BASOPHILS # BLD AUTO: 0.1 K/UL — SIGNIFICANT CHANGE UP (ref 0–0.2)
BASOPHILS NFR BLD AUTO: 1.7 % — SIGNIFICANT CHANGE UP (ref 0–2)
BILIRUB SERPL-MCNC: 0.2 MG/DL — SIGNIFICANT CHANGE UP (ref 0.2–1.2)
BILIRUB UR-MCNC: NEGATIVE — SIGNIFICANT CHANGE UP
BUN SERPL-MCNC: 24 MG/DL — HIGH (ref 7–23)
CALCIUM SERPL-MCNC: 9.4 MG/DL — SIGNIFICANT CHANGE UP (ref 8.4–10.5)
CHLORIDE SERPL-SCNC: 94 MMOL/L — LOW (ref 96–108)
CO2 SERPL-SCNC: 27 MMOL/L — SIGNIFICANT CHANGE UP (ref 22–31)
COLOR SPEC: YELLOW — SIGNIFICANT CHANGE UP
CREAT SERPL-MCNC: 1.47 MG/DL — HIGH (ref 0.5–1.3)
DIFF PNL FLD: ABNORMAL
EOSINOPHIL # BLD AUTO: 0.1 K/UL — SIGNIFICANT CHANGE UP (ref 0–0.5)
EOSINOPHIL NFR BLD AUTO: 2.2 % — SIGNIFICANT CHANGE UP (ref 0–6)
GLUCOSE SERPL-MCNC: 594 MG/DL — CRITICAL HIGH (ref 70–99)
GLUCOSE UR QL: 1000 MG/DL
HCT VFR BLD CALC: 34.3 % — LOW (ref 34.5–45)
HGB BLD-MCNC: 10.8 G/DL — LOW (ref 11.5–15.5)
KETONES UR-MCNC: NEGATIVE — SIGNIFICANT CHANGE UP
LEUKOCYTE ESTERASE UR-ACNC: NEGATIVE — SIGNIFICANT CHANGE UP
LIDOCAIN IGE QN: 125 U/L — SIGNIFICANT CHANGE UP (ref 73–393)
LYMPHOCYTES # BLD AUTO: 1.2 K/UL — SIGNIFICANT CHANGE UP (ref 1–3.3)
LYMPHOCYTES # BLD AUTO: 26.8 % — SIGNIFICANT CHANGE UP (ref 13–44)
MCHC RBC-ENTMCNC: 30 PG — SIGNIFICANT CHANGE UP (ref 27–34)
MCHC RBC-ENTMCNC: 31.4 GM/DL — LOW (ref 32–36)
MCV RBC AUTO: 95.3 FL — SIGNIFICANT CHANGE UP (ref 80–100)
MONOCYTES # BLD AUTO: 0.4 K/UL — SIGNIFICANT CHANGE UP (ref 0–0.9)
MONOCYTES NFR BLD AUTO: 7.9 % — SIGNIFICANT CHANGE UP (ref 1–9)
NEUTROPHILS # BLD AUTO: 2.8 K/UL — SIGNIFICANT CHANGE UP (ref 1.8–7.4)
NEUTROPHILS NFR BLD AUTO: 61.3 % — SIGNIFICANT CHANGE UP (ref 43–77)
NITRITE UR-MCNC: NEGATIVE — SIGNIFICANT CHANGE UP
PH UR: 5 — SIGNIFICANT CHANGE UP (ref 5–8)
PLATELET # BLD AUTO: 213 K/UL — SIGNIFICANT CHANGE UP (ref 150–400)
POTASSIUM SERPL-MCNC: 4.7 MMOL/L — SIGNIFICANT CHANGE UP (ref 3.5–5.3)
POTASSIUM SERPL-SCNC: 4.7 MMOL/L — SIGNIFICANT CHANGE UP (ref 3.5–5.3)
PROT SERPL-MCNC: 7.9 G/DL — SIGNIFICANT CHANGE UP (ref 6–8.3)
PROT UR-MCNC: 15
RBC # BLD: 3.6 M/UL — LOW (ref 3.8–5.2)
RBC # FLD: 12.7 % — SIGNIFICANT CHANGE UP (ref 10.3–14.5)
SODIUM SERPL-SCNC: 130 MMOL/L — LOW (ref 135–145)
SP GR SPEC: 1.01 — SIGNIFICANT CHANGE UP (ref 1.01–1.02)
UROBILINOGEN FLD QL: NEGATIVE — SIGNIFICANT CHANGE UP
WBC # BLD: 4.6 K/UL — SIGNIFICANT CHANGE UP (ref 3.8–10.5)
WBC # FLD AUTO: 4.6 K/UL — SIGNIFICANT CHANGE UP (ref 3.8–10.5)

## 2018-08-23 PROCEDURE — 71045 X-RAY EXAM CHEST 1 VIEW: CPT | Mod: 26

## 2018-08-23 RX ORDER — SODIUM CHLORIDE 9 MG/ML
3 INJECTION INTRAMUSCULAR; INTRAVENOUS; SUBCUTANEOUS ONCE
Qty: 0 | Refills: 0 | Status: COMPLETED | OUTPATIENT
Start: 2018-08-23 | End: 2018-08-23

## 2018-08-23 RX ORDER — SODIUM CHLORIDE 9 MG/ML
250 INJECTION INTRAMUSCULAR; INTRAVENOUS; SUBCUTANEOUS ONCE
Qty: 0 | Refills: 0 | Status: COMPLETED | OUTPATIENT
Start: 2018-08-23 | End: 2018-08-23

## 2018-08-23 RX ORDER — INSULIN HUMAN 100 [IU]/ML
2 INJECTION, SOLUTION SUBCUTANEOUS ONCE
Qty: 0 | Refills: 0 | Status: COMPLETED | OUTPATIENT
Start: 2018-08-23 | End: 2018-08-23

## 2018-08-23 RX ORDER — SODIUM CHLORIDE 9 MG/ML
1000 INJECTION INTRAMUSCULAR; INTRAVENOUS; SUBCUTANEOUS ONCE
Qty: 0 | Refills: 0 | Status: COMPLETED | OUTPATIENT
Start: 2018-08-23 | End: 2018-08-23

## 2018-08-23 RX ORDER — INSULIN HUMAN 100 [IU]/ML
8 INJECTION, SOLUTION SUBCUTANEOUS ONCE
Qty: 0 | Refills: 0 | Status: COMPLETED | OUTPATIENT
Start: 2018-08-23 | End: 2018-08-23

## 2018-08-23 RX ADMIN — INSULIN HUMAN 2 UNIT(S): 100 INJECTION, SOLUTION SUBCUTANEOUS at 21:29

## 2018-08-23 RX ADMIN — SODIUM CHLORIDE 250 MILLILITER(S): 9 INJECTION INTRAMUSCULAR; INTRAVENOUS; SUBCUTANEOUS at 21:50

## 2018-08-23 RX ADMIN — INSULIN HUMAN 8 UNIT(S): 100 INJECTION, SOLUTION SUBCUTANEOUS at 19:40

## 2018-08-23 RX ADMIN — SODIUM CHLORIDE 500 MILLILITER(S): 9 INJECTION INTRAMUSCULAR; INTRAVENOUS; SUBCUTANEOUS at 21:29

## 2018-08-23 RX ADMIN — SODIUM CHLORIDE 3 MILLILITER(S): 9 INJECTION INTRAMUSCULAR; INTRAVENOUS; SUBCUTANEOUS at 19:39

## 2018-08-23 RX ADMIN — SODIUM CHLORIDE 1000 MILLILITER(S): 9 INJECTION INTRAMUSCULAR; INTRAVENOUS; SUBCUTANEOUS at 19:40

## 2018-08-23 RX ADMIN — SODIUM CHLORIDE 1000 MILLILITER(S): 9 INJECTION INTRAMUSCULAR; INTRAVENOUS; SUBCUTANEOUS at 20:24

## 2018-08-23 NOTE — ED PROVIDER NOTE - PROGRESS NOTE DETAILS
pt remains well appearing. no complaints. FSG improved. no acetone. will return pt to assisted living

## 2018-08-23 NOTE — ED ADULT NURSE NOTE - NSIMPLEMENTINTERV_GEN_ALL_ED
Implemented All Fall with Harm Risk Interventions:  [place] to call system. Call bell, personal items and telephone within reach. Instruct patient to call for assistance. Room bathroom lighting operational. Non-slip footwear when patient is off stretcher. Physically safe environment: no spills, clutter or unnecessary equipment. Stretcher in lowest position, wheels locked, appropriate side rails in place. Provide visual cue, wrist band, yellow gown, etc. Monitor gait and stability. Monitor for mental status changes and reorient to person, place, and time. Review medications for side effects contributing to fall risk. Reinforce activity limits and safety measures with patient and family. Provide visual clues: red socks.

## 2018-08-23 NOTE — ED PROVIDER NOTE - MEDICAL DECISION MAKING DETAILS
hyperglycemia. stable. no signs/sxs of dka.  check labs. r/o infection, dka.  give iv fluids, insulin. reassess

## 2018-08-23 NOTE — ED ADULT NURSE NOTE - PMH
Dementia    Hypertension, unspecified type    Type 2 diabetes mellitus with complication, without long-term current use of insulin

## 2018-08-23 NOTE — ED PROVIDER NOTE - OBJECTIVE STATEMENT
pt sent from assisted living facility for hyperglycemia tonight. pt w dementia. unable to give coherent history. pt denies feeling ill or any pain or other sxs

## 2018-08-23 NOTE — ED PROVIDER NOTE - PMH
Hypertension, unspecified type    Type 2 diabetes mellitus with complication, without long-term current use of insulin

## 2018-08-23 NOTE — ED PROVIDER NOTE - CONSTITUTIONAL, MLM
normal... Well appearing, well nourished, awake, alert, oriented to person only. and in no apparent distress. fully awake, alert, conversant but demented

## 2018-08-26 ENCOUNTER — EMERGENCY (EMERGENCY)
Facility: HOSPITAL | Age: 83
LOS: 1 days | Discharge: HOME | End: 2018-08-26
Attending: INTERNAL MEDICINE | Admitting: INTERNAL MEDICINE
Payer: MEDICARE

## 2018-08-26 VITALS
SYSTOLIC BLOOD PRESSURE: 152 MMHG | RESPIRATION RATE: 18 BRPM | HEART RATE: 118 BPM | TEMPERATURE: 100 F | DIASTOLIC BLOOD PRESSURE: 84 MMHG | OXYGEN SATURATION: 98 %

## 2018-08-26 VITALS
HEIGHT: 64 IN | TEMPERATURE: 98 F | HEART RATE: 85 BPM | SYSTOLIC BLOOD PRESSURE: 98 MMHG | RESPIRATION RATE: 18 BRPM | WEIGHT: 119.93 LBS | DIASTOLIC BLOOD PRESSURE: 64 MMHG

## 2018-08-26 DIAGNOSIS — E11.8 TYPE 2 DIABETES MELLITUS WITH UNSPECIFIED COMPLICATIONS: ICD-10-CM

## 2018-08-26 DIAGNOSIS — R10.9 UNSPECIFIED ABDOMINAL PAIN: ICD-10-CM

## 2018-08-26 DIAGNOSIS — F03.90 UNSPECIFIED DEMENTIA WITHOUT BEHAVIORAL DISTURBANCE: ICD-10-CM

## 2018-08-26 DIAGNOSIS — I10 ESSENTIAL (PRIMARY) HYPERTENSION: ICD-10-CM

## 2018-08-26 DIAGNOSIS — R50.9 FEVER, UNSPECIFIED: ICD-10-CM

## 2018-08-26 DIAGNOSIS — A41.9 SEPSIS, UNSPECIFIED ORGANISM: ICD-10-CM

## 2018-08-26 DIAGNOSIS — Z90.49 ACQUIRED ABSENCE OF OTHER SPECIFIED PARTS OF DIGESTIVE TRACT: Chronic | ICD-10-CM

## 2018-08-26 DIAGNOSIS — R73.9 HYPERGLYCEMIA, UNSPECIFIED: ICD-10-CM

## 2018-08-26 DIAGNOSIS — Z29.9 ENCOUNTER FOR PROPHYLACTIC MEASURES, UNSPECIFIED: ICD-10-CM

## 2018-08-26 LAB
ALBUMIN SERPL ELPH-MCNC: 3.4 G/DL — SIGNIFICANT CHANGE UP (ref 3.3–5)
ALP SERPL-CCNC: 77 U/L — SIGNIFICANT CHANGE UP (ref 40–120)
ALT FLD-CCNC: 16 U/L DA — SIGNIFICANT CHANGE UP (ref 10–45)
ANION GAP SERPL CALC-SCNC: 13 MMOL/L — SIGNIFICANT CHANGE UP (ref 5–17)
ANION GAP SERPL CALC-SCNC: 9 MMOL/L — SIGNIFICANT CHANGE UP (ref 5–17)
APPEARANCE UR: CLEAR — SIGNIFICANT CHANGE UP
APTT BLD: 29.4 SEC — SIGNIFICANT CHANGE UP (ref 27.5–37.4)
AST SERPL-CCNC: 11 U/L — SIGNIFICANT CHANGE UP (ref 10–40)
BASOPHILS NFR BLD AUTO: 1 % — SIGNIFICANT CHANGE UP (ref 0–2)
BILIRUB SERPL-MCNC: 0.5 MG/DL — SIGNIFICANT CHANGE UP (ref 0.2–1.2)
BILIRUB UR-MCNC: NEGATIVE — SIGNIFICANT CHANGE UP
BLOOD GAS COMMENTS, VENOUS: SIGNIFICANT CHANGE UP
BUN SERPL-MCNC: 22 MG/DL — SIGNIFICANT CHANGE UP (ref 7–23)
BUN SERPL-MCNC: 28 MG/DL — HIGH (ref 7–23)
CALCIUM SERPL-MCNC: 8.9 MG/DL — SIGNIFICANT CHANGE UP (ref 8.4–10.5)
CALCIUM SERPL-MCNC: 9.9 MG/DL — SIGNIFICANT CHANGE UP (ref 8.4–10.5)
CHLORIDE SERPL-SCNC: 106 MMOL/L — SIGNIFICANT CHANGE UP (ref 96–108)
CHLORIDE SERPL-SCNC: 95 MMOL/L — LOW (ref 96–108)
CO2 BLDV-SCNC: 24 MMOL/L — SIGNIFICANT CHANGE UP (ref 21–29)
CO2 SERPL-SCNC: 23 MMOL/L — SIGNIFICANT CHANGE UP (ref 22–31)
CO2 SERPL-SCNC: 26 MMOL/L — SIGNIFICANT CHANGE UP (ref 22–31)
COLOR SPEC: YELLOW — SIGNIFICANT CHANGE UP
CREAT SERPL-MCNC: 1.35 MG/DL — HIGH (ref 0.5–1.3)
CREAT SERPL-MCNC: 1.77 MG/DL — HIGH (ref 0.5–1.3)
DACRYOCYTES BLD QL SMEAR: SLIGHT — SIGNIFICANT CHANGE UP
DIFF PNL FLD: ABNORMAL
GLUCOSE BLDC GLUCOMTR-MCNC: 120 MG/DL — HIGH (ref 70–99)
GLUCOSE BLDC GLUCOMTR-MCNC: 181 MG/DL — HIGH (ref 70–99)
GLUCOSE BLDC GLUCOMTR-MCNC: 228 MG/DL — HIGH (ref 70–99)
GLUCOSE BLDC GLUCOMTR-MCNC: 230 MG/DL — HIGH (ref 70–99)
GLUCOSE BLDC GLUCOMTR-MCNC: 285 MG/DL — HIGH (ref 70–99)
GLUCOSE BLDC GLUCOMTR-MCNC: 543 MG/DL — CRITICAL HIGH (ref 70–99)
GLUCOSE BLDC GLUCOMTR-MCNC: 599 MG/DL — CRITICAL HIGH (ref 70–99)
GLUCOSE BLDC GLUCOMTR-MCNC: >600 MG/DL — CRITICAL HIGH (ref 70–99)
GLUCOSE SERPL-MCNC: 163 MG/DL — HIGH (ref 70–99)
GLUCOSE SERPL-MCNC: 665 MG/DL — CRITICAL HIGH (ref 70–99)
GLUCOSE UR QL: 250
HCT VFR BLD CALC: 27.4 % — LOW (ref 34.5–45)
HCT VFR BLD CALC: 28.9 % — LOW (ref 34.5–45)
HGB BLD-MCNC: 8.7 G/DL — LOW (ref 11.5–15.5)
HGB BLD-MCNC: 9.2 G/DL — LOW (ref 11.5–15.5)
HOROWITZ INDEX BLDV+IHG-RTO: SIGNIFICANT CHANGE UP
INR BLD: 1.09 RATIO — SIGNIFICANT CHANGE UP (ref 0.88–1.16)
KETONES UR-MCNC: NEGATIVE — SIGNIFICANT CHANGE UP
LACTATE SERPL-SCNC: 4.5 MMOL/L — CRITICAL HIGH (ref 0.7–2)
LACTATE SERPL-SCNC: 5.5 MMOL/L — CRITICAL HIGH (ref 0.7–2)
LEUKOCYTE ESTERASE UR-ACNC: ABNORMAL
LYMPHOCYTES # BLD AUTO: 5 % — LOW (ref 13–44)
MCHC RBC-ENTMCNC: 30.1 PG — SIGNIFICANT CHANGE UP (ref 27–34)
MCHC RBC-ENTMCNC: 30.1 PG — SIGNIFICANT CHANGE UP (ref 27–34)
MCHC RBC-ENTMCNC: 31.8 GM/DL — LOW (ref 32–36)
MCHC RBC-ENTMCNC: 31.8 GM/DL — LOW (ref 32–36)
MCV RBC AUTO: 94.8 FL — SIGNIFICANT CHANGE UP (ref 80–100)
MCV RBC AUTO: 94.8 FL — SIGNIFICANT CHANGE UP (ref 80–100)
MONOCYTES NFR BLD AUTO: 8 % — SIGNIFICANT CHANGE UP (ref 2–14)
NEUTROPHILS NFR BLD AUTO: 82 % — HIGH (ref 43–77)
NEUTS BAND # BLD: 4 % — SIGNIFICANT CHANGE UP (ref 0–8)
NITRITE UR-MCNC: NEGATIVE — SIGNIFICANT CHANGE UP
OSMOLALITY SERPL: 318 MOS/KG — HIGH (ref 275–300)
PCO2 BLDV: 41 MMHG — SIGNIFICANT CHANGE UP (ref 35–50)
PH BLDV: 7.36 — SIGNIFICANT CHANGE UP (ref 7.35–7.45)
PH UR: 5 — SIGNIFICANT CHANGE UP (ref 5–8)
PLAT MORPH BLD: NORMAL — SIGNIFICANT CHANGE UP
PLATELET # BLD AUTO: 143 K/UL — LOW (ref 150–400)
PLATELET # BLD AUTO: 163 K/UL — SIGNIFICANT CHANGE UP (ref 150–400)
PO2 BLDV: <46 MMHG — HIGH (ref 25–45)
POIKILOCYTOSIS BLD QL AUTO: SLIGHT — SIGNIFICANT CHANGE UP
POTASSIUM SERPL-MCNC: 3.7 MMOL/L — SIGNIFICANT CHANGE UP (ref 3.5–5.3)
POTASSIUM SERPL-MCNC: 4.8 MMOL/L — SIGNIFICANT CHANGE UP (ref 3.5–5.3)
POTASSIUM SERPL-SCNC: 3.7 MMOL/L — SIGNIFICANT CHANGE UP (ref 3.5–5.3)
POTASSIUM SERPL-SCNC: 4.8 MMOL/L — SIGNIFICANT CHANGE UP (ref 3.5–5.3)
PROT SERPL-MCNC: 7.6 G/DL — SIGNIFICANT CHANGE UP (ref 6–8.3)
PROT UR-MCNC: 100
PROTHROM AB SERPL-ACNC: 12.1 SEC — SIGNIFICANT CHANGE UP (ref 9.8–12.7)
RBC # BLD: 2.89 M/UL — LOW (ref 3.8–5.2)
RBC # BLD: 3.05 M/UL — LOW (ref 3.8–5.2)
RBC # FLD: 12.2 % — SIGNIFICANT CHANGE UP (ref 10.3–14.5)
RBC # FLD: 12.2 % — SIGNIFICANT CHANGE UP (ref 10.3–14.5)
RBC BLD AUTO: ABNORMAL
SAO2 % BLDV: 25 % — LOW (ref 67–88)
SODIUM SERPL-SCNC: 130 MMOL/L — LOW (ref 135–145)
SODIUM SERPL-SCNC: 142 MMOL/L — SIGNIFICANT CHANGE UP (ref 135–145)
SP GR SPEC: 1.01 — SIGNIFICANT CHANGE UP (ref 1.01–1.02)
STOMATOCYTES BLD QL SMEAR: PRESENT — SIGNIFICANT CHANGE UP
TROPONIN I SERPL-MCNC: <.017 NG/ML — LOW (ref 0.02–0.06)
UROBILINOGEN FLD QL: NEGATIVE — SIGNIFICANT CHANGE UP
WBC # BLD: 1.8 K/UL — LOW (ref 3.8–10.5)
WBC # BLD: 8.6 K/UL — SIGNIFICANT CHANGE UP (ref 3.8–10.5)
WBC # FLD AUTO: 1.8 K/UL — LOW (ref 3.8–10.5)
WBC # FLD AUTO: 8.6 K/UL — SIGNIFICANT CHANGE UP (ref 3.8–10.5)

## 2018-08-26 PROCEDURE — 93010 ELECTROCARDIOGRAM REPORT: CPT

## 2018-08-26 PROCEDURE — 71045 X-RAY EXAM CHEST 1 VIEW: CPT | Mod: 26

## 2018-08-26 PROCEDURE — 99285 EMERGENCY DEPT VISIT HI MDM: CPT

## 2018-08-26 RX ORDER — SODIUM CHLORIDE 9 MG/ML
1000 INJECTION INTRAMUSCULAR; INTRAVENOUS; SUBCUTANEOUS
Qty: 0 | Refills: 0 | Status: DISCONTINUED | OUTPATIENT
Start: 2018-08-26 | End: 2018-08-26

## 2018-08-26 RX ORDER — QUETIAPINE FUMARATE 200 MG/1
25 TABLET, FILM COATED ORAL EVERY 8 HOURS
Qty: 0 | Refills: 0 | Status: DISCONTINUED | OUTPATIENT
Start: 2018-08-26 | End: 2018-08-31

## 2018-08-26 RX ORDER — SODIUM CHLORIDE 9 MG/ML
1000 INJECTION, SOLUTION INTRAVENOUS ONCE
Qty: 0 | Refills: 0 | Status: COMPLETED | OUTPATIENT
Start: 2018-08-26 | End: 2018-08-26

## 2018-08-26 RX ORDER — DEXTROSE 50 % IN WATER 50 %
25 SYRINGE (ML) INTRAVENOUS ONCE
Qty: 0 | Refills: 0 | Status: DISCONTINUED | OUTPATIENT
Start: 2018-08-26 | End: 2018-08-31

## 2018-08-26 RX ORDER — INSULIN HUMAN 100 [IU]/ML
10 INJECTION, SOLUTION SUBCUTANEOUS ONCE
Qty: 0 | Refills: 0 | Status: COMPLETED | OUTPATIENT
Start: 2018-08-26 | End: 2018-08-26

## 2018-08-26 RX ORDER — SODIUM CHLORIDE 9 MG/ML
1000 INJECTION INTRAMUSCULAR; INTRAVENOUS; SUBCUTANEOUS
Qty: 0 | Refills: 0 | Status: DISCONTINUED | OUTPATIENT
Start: 2018-08-26 | End: 2018-08-27

## 2018-08-26 RX ORDER — SERTRALINE 25 MG/1
25 TABLET, FILM COATED ORAL DAILY
Qty: 0 | Refills: 0 | Status: DISCONTINUED | OUTPATIENT
Start: 2018-08-26 | End: 2018-08-31

## 2018-08-26 RX ORDER — PREGABALIN 225 MG/1
1000 CAPSULE ORAL
Qty: 0 | Refills: 0 | Status: DISCONTINUED | OUTPATIENT
Start: 2018-08-26 | End: 2018-08-30

## 2018-08-26 RX ORDER — LISINOPRIL 2.5 MG/1
10 TABLET ORAL DAILY
Qty: 0 | Refills: 0 | Status: DISCONTINUED | OUTPATIENT
Start: 2018-08-26 | End: 2018-08-30

## 2018-08-26 RX ORDER — INSULIN LISPRO 100/ML
VIAL (ML) SUBCUTANEOUS EVERY 4 HOURS
Qty: 0 | Refills: 0 | Status: DISCONTINUED | OUTPATIENT
Start: 2018-08-26 | End: 2018-08-26

## 2018-08-26 RX ORDER — AMLODIPINE BESYLATE 2.5 MG/1
10 TABLET ORAL DAILY
Qty: 0 | Refills: 0 | Status: DISCONTINUED | OUTPATIENT
Start: 2018-08-26 | End: 2018-08-27

## 2018-08-26 RX ORDER — LISINOPRIL 2.5 MG/1
10 TABLET ORAL DAILY
Qty: 0 | Refills: 0 | Status: DISCONTINUED | OUTPATIENT
Start: 2018-08-26 | End: 2018-08-27

## 2018-08-26 RX ORDER — INSULIN GLARGINE 100 [IU]/ML
12 INJECTION, SOLUTION SUBCUTANEOUS ONCE
Qty: 0 | Refills: 0 | Status: COMPLETED | OUTPATIENT
Start: 2018-08-26 | End: 2018-08-26

## 2018-08-26 RX ORDER — INSULIN HUMAN 100 [IU]/ML
6 INJECTION, SOLUTION SUBCUTANEOUS ONCE
Qty: 0 | Refills: 0 | Status: COMPLETED | OUTPATIENT
Start: 2018-08-26 | End: 2018-08-26

## 2018-08-26 RX ORDER — SODIUM CHLORIDE 9 MG/ML
1000 INJECTION INTRAMUSCULAR; INTRAVENOUS; SUBCUTANEOUS ONCE
Qty: 0 | Refills: 0 | Status: COMPLETED | OUTPATIENT
Start: 2018-08-26 | End: 2018-08-26

## 2018-08-26 RX ORDER — PANTOPRAZOLE SODIUM 20 MG/1
40 TABLET, DELAYED RELEASE ORAL DAILY
Qty: 0 | Refills: 0 | Status: DISCONTINUED | OUTPATIENT
Start: 2018-08-26 | End: 2018-08-30

## 2018-08-26 RX ORDER — PREGABALIN 225 MG/1
1000 CAPSULE ORAL
Qty: 0 | Refills: 0 | Status: DISCONTINUED | OUTPATIENT
Start: 2018-08-26 | End: 2018-08-31

## 2018-08-26 RX ORDER — SODIUM CHLORIDE 9 MG/ML
1000 INJECTION INTRAMUSCULAR; INTRAVENOUS; SUBCUTANEOUS
Qty: 0 | Refills: 0 | Status: DISCONTINUED | OUTPATIENT
Start: 2018-08-26 | End: 2018-08-30

## 2018-08-26 RX ORDER — DEXTROSE 50 % IN WATER 50 %
12.5 SYRINGE (ML) INTRAVENOUS ONCE
Qty: 0 | Refills: 0 | Status: DISCONTINUED | OUTPATIENT
Start: 2018-08-26 | End: 2018-08-31

## 2018-08-26 RX ORDER — ACETAMINOPHEN 500 MG
650 TABLET ORAL EVERY 6 HOURS
Qty: 0 | Refills: 0 | Status: DISCONTINUED | OUTPATIENT
Start: 2018-08-26 | End: 2018-08-31

## 2018-08-26 RX ORDER — PANTOPRAZOLE SODIUM 20 MG/1
40 TABLET, DELAYED RELEASE ORAL DAILY
Qty: 0 | Refills: 0 | Status: DISCONTINUED | OUTPATIENT
Start: 2018-08-26 | End: 2018-08-31

## 2018-08-26 RX ORDER — SODIUM CHLORIDE 9 MG/ML
1000 INJECTION, SOLUTION INTRAVENOUS
Qty: 0 | Refills: 0 | Status: DISCONTINUED | OUTPATIENT
Start: 2018-08-26 | End: 2018-08-31

## 2018-08-26 RX ORDER — DEXTROSE 50 % IN WATER 50 %
15 SYRINGE (ML) INTRAVENOUS ONCE
Qty: 0 | Refills: 0 | Status: DISCONTINUED | OUTPATIENT
Start: 2018-08-26 | End: 2018-08-31

## 2018-08-26 RX ORDER — METFORMIN HYDROCHLORIDE 850 MG/1
1 TABLET ORAL
Qty: 0 | Refills: 0 | COMMUNITY

## 2018-08-26 RX ORDER — FAMOTIDINE 10 MG/ML
1 INJECTION INTRAVENOUS
Qty: 0 | Refills: 0 | COMMUNITY

## 2018-08-26 RX ORDER — TRAZODONE HCL 50 MG
50 TABLET ORAL AT BEDTIME
Qty: 0 | Refills: 0 | Status: DISCONTINUED | OUTPATIENT
Start: 2018-08-26 | End: 2018-08-30

## 2018-08-26 RX ORDER — SODIUM CHLORIDE 9 MG/ML
3 INJECTION INTRAMUSCULAR; INTRAVENOUS; SUBCUTANEOUS ONCE
Qty: 0 | Refills: 0 | Status: COMPLETED | OUTPATIENT
Start: 2018-08-26 | End: 2018-08-26

## 2018-08-26 RX ORDER — INSULIN LISPRO 100/ML
VIAL (ML) SUBCUTANEOUS
Qty: 0 | Refills: 0 | Status: DISCONTINUED | OUTPATIENT
Start: 2018-08-26 | End: 2018-08-31

## 2018-08-26 RX ORDER — SERTRALINE 25 MG/1
25 TABLET, FILM COATED ORAL DAILY
Qty: 0 | Refills: 0 | Status: DISCONTINUED | OUTPATIENT
Start: 2018-08-26 | End: 2018-08-30

## 2018-08-26 RX ORDER — SODIUM CHLORIDE 9 MG/ML
1000 INJECTION INTRAMUSCULAR; INTRAVENOUS; SUBCUTANEOUS
Qty: 0 | Refills: 0 | Status: COMPLETED | OUTPATIENT
Start: 2018-08-26 | End: 2018-08-26

## 2018-08-26 RX ORDER — ACETAMINOPHEN 500 MG
650 TABLET ORAL EVERY 6 HOURS
Qty: 0 | Refills: 0 | Status: DISCONTINUED | OUTPATIENT
Start: 2018-08-26 | End: 2018-08-30

## 2018-08-26 RX ORDER — INSULIN GLARGINE 100 [IU]/ML
12 INJECTION, SOLUTION SUBCUTANEOUS AT BEDTIME
Qty: 0 | Refills: 0 | Status: DISCONTINUED | OUTPATIENT
Start: 2018-08-27 | End: 2018-08-28

## 2018-08-26 RX ORDER — QUETIAPINE FUMARATE 200 MG/1
25 TABLET, FILM COATED ORAL EVERY 8 HOURS
Qty: 0 | Refills: 0 | Status: DISCONTINUED | OUTPATIENT
Start: 2018-08-26 | End: 2018-08-30

## 2018-08-26 RX ORDER — AMLODIPINE BESYLATE 2.5 MG/1
10 TABLET ORAL DAILY
Qty: 0 | Refills: 0 | Status: DISCONTINUED | OUTPATIENT
Start: 2018-08-26 | End: 2018-08-30

## 2018-08-26 RX ORDER — GLUCAGON INJECTION, SOLUTION 0.5 MG/.1ML
1 INJECTION, SOLUTION SUBCUTANEOUS ONCE
Qty: 0 | Refills: 0 | Status: DISCONTINUED | OUTPATIENT
Start: 2018-08-26 | End: 2018-08-31

## 2018-08-26 RX ORDER — TRAZODONE HCL 50 MG
1 TABLET ORAL
Qty: 0 | Refills: 0 | COMMUNITY

## 2018-08-26 RX ORDER — LISINOPRIL 2.5 MG/1
20 TABLET ORAL DAILY
Qty: 0 | Refills: 0 | Status: DISCONTINUED | OUTPATIENT
Start: 2018-08-26 | End: 2018-08-26

## 2018-08-26 RX ORDER — TRAZODONE HCL 50 MG
50 TABLET ORAL AT BEDTIME
Qty: 0 | Refills: 0 | Status: DISCONTINUED | OUTPATIENT
Start: 2018-08-26 | End: 2018-08-31

## 2018-08-26 RX ADMIN — SODIUM CHLORIDE 1000 MILLILITER(S): 9 INJECTION INTRAMUSCULAR; INTRAVENOUS; SUBCUTANEOUS at 11:01

## 2018-08-26 RX ADMIN — SODIUM CHLORIDE 1000 MILLILITER(S): 9 INJECTION INTRAMUSCULAR; INTRAVENOUS; SUBCUTANEOUS at 13:51

## 2018-08-26 RX ADMIN — SODIUM CHLORIDE 1000 MILLILITER(S): 9 INJECTION, SOLUTION INTRAVENOUS at 19:09

## 2018-08-26 RX ADMIN — SODIUM CHLORIDE 50 MILLILITER(S): 9 INJECTION INTRAMUSCULAR; INTRAVENOUS; SUBCUTANEOUS at 17:03

## 2018-08-26 RX ADMIN — QUETIAPINE FUMARATE 25 MILLIGRAM(S): 200 TABLET, FILM COATED ORAL at 17:50

## 2018-08-26 RX ADMIN — INSULIN GLARGINE 12 UNIT(S): 100 INJECTION, SOLUTION SUBCUTANEOUS at 17:51

## 2018-08-26 RX ADMIN — SODIUM CHLORIDE 125 MILLILITER(S): 9 INJECTION INTRAMUSCULAR; INTRAVENOUS; SUBCUTANEOUS at 11:01

## 2018-08-26 RX ADMIN — SODIUM CHLORIDE 1000 MILLILITER(S): 9 INJECTION INTRAMUSCULAR; INTRAVENOUS; SUBCUTANEOUS at 13:43

## 2018-08-26 RX ADMIN — INSULIN HUMAN 10 UNIT(S): 100 INJECTION, SOLUTION SUBCUTANEOUS at 12:52

## 2018-08-26 RX ADMIN — SODIUM CHLORIDE 1000 MILLILITER(S): 9 INJECTION INTRAMUSCULAR; INTRAVENOUS; SUBCUTANEOUS at 17:03

## 2018-08-26 RX ADMIN — PREGABALIN 1000 MICROGRAM(S): 225 CAPSULE ORAL at 17:50

## 2018-08-26 RX ADMIN — SODIUM CHLORIDE 1000 MILLILITER(S): 9 INJECTION INTRAMUSCULAR; INTRAVENOUS; SUBCUTANEOUS at 12:00

## 2018-08-26 RX ADMIN — Medication 4: at 17:19

## 2018-08-26 RX ADMIN — SODIUM CHLORIDE 3 MILLILITER(S): 9 INJECTION INTRAMUSCULAR; INTRAVENOUS; SUBCUTANEOUS at 11:01

## 2018-08-26 RX ADMIN — Medication 650 MILLIGRAM(S): at 17:03

## 2018-08-26 RX ADMIN — Medication 50 MILLIGRAM(S): at 22:10

## 2018-08-26 RX ADMIN — INSULIN HUMAN 6 UNIT(S): 100 INJECTION, SOLUTION SUBCUTANEOUS at 11:32

## 2018-08-26 RX ADMIN — SODIUM CHLORIDE 1000 MILLILITER(S): 9 INJECTION INTRAMUSCULAR; INTRAVENOUS; SUBCUTANEOUS at 12:51

## 2018-08-26 NOTE — H&P ADULT - NSHPPHYSICALEXAM_GEN_ALL_CORE
Vital Signs Last 24 Hrs  T(F): 100.4 (26 Aug 2018 14:15), Max: 100.4 (26 Aug 2018 14:15)  HR: 118 (26 Aug 2018 14:15) (85 - 118)  BP: 152/84 (26 Aug 2018 14:15) (98/64 - 152/84)  RR: 18 (26 Aug 2018 14:15) (17 - 18)  SpO2: 98% RA (26 Aug 2018 14:15) (98% - 99%)    Physical Exam  Gen:  Thin well developed female resting in bed, NAD  ENT:  NC/AT, no JVD noted  Thorax:  Symmetric, no retractions  Lung:  CTA b/l  CV:  S1, S2. RRR  Abd:  Soft, NT/ND.  BS normoactive, no masses to palp  Extrem:  No C/C/E, DP/radial pulses +2  Neuro:  Alert to self only, no gross motor/sensory deficits Vital Signs Last 24 Hrs  T(F): 100.4 (26 Aug 2018 14:15), Max: 100.4 (26 Aug 2018 14:15)  HR: 118 (26 Aug 2018 14:15) (85 - 118)  BP: 152/84 (26 Aug 2018 14:15) (98/64 - 152/84)  RR: 18 (26 Aug 2018 14:15) (17 - 18)  SpO2: 98% RA (26 Aug 2018 14:15) (98% - 99%)    Physical Exam  Gen:  Thin well developed female resting in bed, NAD  ENT:  NC/AT, no JVD noted  Thorax:  Symmetric, no retractions  Lung:  CTA b/l  CV:  S1, S2. RRR  Abd:  Soft, NT/ND.  BS normoactive, no masses to palp.  Midline surgical incision flat, well healed  Extrem:  No C/C/E, DP/radial pulses +2  Neuro:  Alert to self only, no gross motor/sensory deficits

## 2018-08-26 NOTE — H&P ADULT - NSHPSOCIALHISTORY_GEN_ALL_CORE
Unable to elicit history tobacco and ETOH use due to patients dementia  Resident at [facility]

## 2018-08-26 NOTE — PATIENT PROFILE ADULT. - PMH
Colon cancer  staus post colon resection  Dementia    Hypertension, unspecified type    Type 2 diabetes mellitus with complication, without long-term current use of insulin

## 2018-08-26 NOTE — H&P ADULT - ASSESSMENT
IMPROVE VTE Individual Risk Assessment    RISK                                                                Points    [  ] Previous VTE                                                  3    [  ] Thrombophilia                                               2    [  ] Lower limb paralysis                                      2        (unable to hold up >15 seconds)      [  ] Current Cancer                                              2         (within 6 months)    [  ] Immobilization > 24 hrs                                1    [  ] ICU/CCU stay > 24 hours                              1    [  ] Age > 60                                                      1    IMPROVE VTE Score __1_______      83 year old female with PMH HTN, colon CA DM2 and dementia presenting to ED from assisted living with non-anion gap hyperglycemia.

## 2018-08-26 NOTE — PROGRESS NOTE ADULT - PROBLEM SELECTOR PLAN 4
-orient as able  -PRN meds if needed (haldol/seroquel)   -avoid deleriogenic meds (benzo, narcotics, anticholinergics)

## 2018-08-26 NOTE — ED PROVIDER NOTE - MEDICAL DECISION MAKING DETAILS
83 y f hx of diabetes , non compliant cc hyperglycemia hi reading , not in dka , blood glucose 665 admitted for diabetes control

## 2018-08-26 NOTE — H&P ADULT - PROBLEM SELECTOR PLAN 1
Monitor blood sugar, IV insulin to control acute hyperglycemia, may need infusion.  Dose SQ Lantus with RISS, check HA1c

## 2018-08-26 NOTE — H&P ADULT - PMH
Dementia    Hypertension, unspecified type    Type 2 diabetes mellitus with complication, without long-term current use of insulin Colon cancer  staus post colon resection  Dementia    Hypertension, unspecified type    Type 2 diabetes mellitus with complication, without long-term current use of insulin

## 2018-08-26 NOTE — ED PROVIDER NOTE - NS ED ROS FT
Constitutional: (-) fever  (-)chills  (-)sweats  Eyes/ENT: (-) blurry vision, (-) epistaxis  (-)rhinorrhea   (-) sore throat    Cardiovascular: (-) chest pain, (-) palpitations (-) edema   Respiratory: (-) cough, (-) shortness of breath   Gastrointestinal: (-)nausea  (-)vomiting, (-) diarrhea  (-) abdominal pain   :  (-)dysuria, (+)frequency, (+)urgency, (-)hematuria  Musculoskeletal: (-) neck pain, (-) back pain, (-) joint pain  Integumentary: (-) rash, (-) edema  Neurological: (-) headache, (-) altered mental status  (-)LOC

## 2018-08-26 NOTE — PATIENT PROFILE ADULT. - PSH
History of colon resection  secondary to colon cancer, unable to determine date and extent of surgery at this time

## 2018-08-26 NOTE — ED PROVIDER NOTE - PHYSICAL EXAMINATION
General:     NAD, dry ill appearing elderly female  Head:     NC/AT, EOMI, oral mucosa dry pink   Neck:     trachea midline  Lungs:     CTA b/l, no w/r/r  CVS:     S1S2, RRR, no m/g/r  Abd:     +BS, s/nt/nd, no organomegaly  Ext:    2+ radial and pedal pulses, no c/c/e  Neuro: AAOx3, no sensory/motor deficits

## 2018-08-26 NOTE — ED ADULT NURSE NOTE - INTERVENTIONS DEFINITIONS
Physically safe environment: no spills, clutter or unnecessary equipment/Review medications for side effects contributing to fall risk/Call bell, personal items and telephone within reach/Instruct patient to call for assistance/Non-slip footwear when patient is off stretcher/Monitor for mental status changes and reorient to person, place, and time/Reinforce activity limits and safety measures with patient and family/[place] to call system/Provide visual cue, wrist band, yellow gown, etc./Provide visual clues: red socks/Room bathroom lighting operational/Stretcher in lowest position, wheels locked, appropriate side rails in place/Monitor gait and stability

## 2018-08-26 NOTE — PROGRESS NOTE ADULT - PROBLEM SELECTOR PLAN 1
-received 30 cc/kg fluid challenge, Lactate remanis >4  -will continue to aggressively hydrate with boluses and maintenance IVF with goal of clearing lactate, patient not currently hypotensive.   -monitor UOP for goal >0.5 cc/kg/hr  -was pan cultured prior to ICU arrival (urine/blood)  -started on levaquin as she has PCN allergy  -if WBS continue to rise, or fevers become persistent will likely need to broaden coverage

## 2018-08-26 NOTE — H&P ADULT - PSH
No significant past surgical history History of colon resection  secondary to colon cancer, unable to determine date and extent of surgery at this time

## 2018-08-26 NOTE — H&P ADULT - HISTORY OF PRESENT ILLNESS
83 year old female with PMH HTN, colon CA and dementia presented to ED today via EMS from [facility] with reported complaint of abdominal pain.  Patient with fairly advanced dementia, at this time offers no complaints.  On evaluation, noted to be hyperglycemic with blood sugars greater than 600 with no anion gap.  Given six units of regular insulin with minimal reduction in blood sugar, ICU consulted for glucose management.  Patient states she has been thirsty, denies SOB, chest pain, nausea, vomiting, fevers, chills, dysuria. 83 year old female with PMH HTN, colon CA DM2 and dementia presented to ED today via EMS from [facility] with reported complaint of abdominal pain.  Patient with fairly advanced dementia, at this time offers no complaints.  On evaluation, noted to be hyperglycemic with blood sugars greater than 600 with no anion gap.  Given six units of regular insulin with minimal reduction in blood sugar, ICU consulted for glucose management.  Patient states she has been thirsty, denies SOB, chest pain, nausea, vomiting, fevers, chills, dysuria.  Of note, was seen in ED 8/23 for hyperglycemia and discharged after glucose control obtained with 10u of insulin IVP. 83 year old female with PMH HTN, ? colon CA DM2 and dementia presented to ED today via EMS from [facility] with reported complaint of abdominal pain.  Patient with fairly advanced dementia, at this time offers no complaints.  On evaluation, noted to be hyperglycemic with blood sugars greater than 600 with no anion gap.  Given six units of regular insulin with minimal reduction in blood sugar, ICU consulted for glucose management.  Patient states she has been thirsty, denies SOB, chest pain, nausea, vomiting, fevers, chills, dysuria.  Of note, was seen in ED 8/23 for hyperglycemia and discharged after glucose control obtained with 10u of insulin IVP.

## 2018-08-26 NOTE — H&P ADULT - ATTENDING COMMENTS
Patient seen and examined in ED    poor historian  d/w Dr. Guzman    Assessment  DM2 poorly controlled with severe hyperglycemia   dementia  HTN    Plan  IV insulin ? need for drip  check a1c  ivf  supportive care  overnight obs in icu if need drip. at this time does not.

## 2018-08-26 NOTE — PROGRESS NOTE ADULT - PROBLEM SELECTOR PLAN 2
-antipyretics PRN  -cultures already done  -increase fludis while febrile to compensate for insensible loses

## 2018-08-26 NOTE — PROGRESS NOTE ADULT - ASSESSMENT
83F w/ underlying dementia and uncontrolled diabetes arrives to ER with hyperglycemia to 600 (no GAP). Treated with insulin, and transferred to ICU for furterh care. On arrival to ICU was noted to ahve shaking rigors and fever of 102.8F. Also, lactate elevated to 5.5 (down to 4.4 with fluids). Urinalysis reveals likely urinary source of severe sepsis.

## 2018-08-26 NOTE — ED ADULT NURSE NOTE - OBJECTIVE STATEMENT
Pt presents from [place] on the [facility], A&O to person-as per EMS pt's baseline mental status is A&O to person, with c/c of elevated blood sugar with vomiting at the facility. F/S as noted.  Abdominal pain, generalized on palpation.   No nausea or vomiting in ED.   Pt denies dizziness, SOB, CP.

## 2018-08-26 NOTE — PROGRESS NOTE ADULT - SUBJECTIVE AND OBJECTIVE BOX
Patient is a 83y old  Female who presents with a chief complaint of     BRIEF HOSPITAL COURSE:     83F w/ underlying dementia and uncontrolled diabetes arrives to ER with hyperglycemia to 600 (no GAP). Treated with insulin, and transferred to ICU for furterh care. On arrival to ICU was noted to ahve shaking rigors and fever of 102.8F. Also, lactate elevated to 5.5 (down to 4.4 with fluids). Urinalysis reveals likely urinary source of severe sepsis.     Events last 24 hours: ***    PAST MEDICAL & SURGICAL HISTORY:  Colon cancer: staus post colon resection  Dementia  Hypertension, unspecified type  Type 2 diabetes mellitus with complication, without long-term current use of insulin  History of colon resection: secondary to colon cancer, unable to determine date and extent of surgery at this time      Review of Systems:  Unable to obtain      Medications:  levoFLOXacin IVPB        amLODIPine   Tablet 10 milliGRAM(s) Oral daily  lisinopril 10 milliGRAM(s) Oral daily      acetaminophen  Suppository 650 milliGRAM(s) Rectal every 6 hours PRN  QUEtiapine 25 milliGRAM(s) Oral every 8 hours  sertraline 25 milliGRAM(s) Oral daily  traZODone 50 milliGRAM(s) Oral at bedtime        pantoprazole    Tablet 40 milliGRAM(s) Oral daily      dextrose 40% Gel 15 Gram(s) Oral once PRN  dextrose 50% Injectable 12.5 Gram(s) IV Push once  dextrose 50% Injectable 25 Gram(s) IV Push once  dextrose 50% Injectable 25 Gram(s) IV Push once  glucagon  Injectable 1 milliGRAM(s) IntraMuscular once PRN  insulin lispro (HumaLOG) corrective regimen sliding scale   SubCutaneous every 4 hours    cyanocobalamin 1000 MICROGram(s) Oral <User Schedule>  dextrose 5%. 1000 milliLiter(s) IV Continuous <Continuous>  sodium chloride 0.9%. 1000 milliLiter(s) IV Continuous <Continuous>  sodium chloride 0.9%. 1000 milliLiter(s) IV Continuous <Continuous>                ICU Vital Signs Last 24 Hrs  T(C): 36.7 (26 Aug 2018 18:00), Max: 39.3 (26 Aug 2018 14:57)  T(F): 98 (26 Aug 2018 18:00), Max: 102.8 (26 Aug 2018 14:57)  HR: 113 (26 Aug 2018 19:00) (85 - 132)  BP: 99/83 (26 Aug 2018 19:00) (98/64 - 152/84)  BP(mean): 90 (26 Aug 2018 19:00) (71 - 90)  ABP: --  ABP(mean): --  RR: 22 (26 Aug 2018 19:00) (15 - 26)  SpO2: 100% (26 Aug 2018 19:00) (76% - 100%)          I&O's Detail    26 Aug 2018 07:01  -  26 Aug 2018 19:46  --------------------------------------------------------  IN:    Lactated Ringers IV Bolus: 500 mL    sodium chloride 0.9%.: 1000 mL    sodium chloride 0.9%.: 150 mL  Total IN: 1650 mL    OUT:  Total OUT: 0 mL    Total NET: 1650 mL            LABS:                        8.7    1.8   )-----------( 143      ( 26 Aug 2018 15:51 )             27.4         142  |  106  |  22  ----------------------------<  163<H>  3.7   |  23  |  1.35<H>    Ca    8.9      26 Aug 2018 15:51    TPro  7.6  /  Alb  3.4  /  TBili  0.5  /  DBili  x   /  AST  11  /  ALT  16  /  AlkPhos  77        CARDIAC MARKERS ( 26 Aug 2018 10:45 )  <.017 ng/mL / x     / x     / x     / x          CAPILLARY BLOOD GLUCOSE      POCT Blood Glucose.: 230 mg/dL (26 Aug 2018 16:54)    PT/INR - ( 26 Aug 2018 10:45 )   PT: 12.1 sec;   INR: 1.09 ratio         PTT - ( 26 Aug 2018 10:45 )  PTT:29.4 sec  Urinalysis Basic - ( 26 Aug 2018 18:08 )    Color: Yellow / Appearance: Clear / S.010 / pH: x  Gluc: x / Ketone: Negative  / Bili: Negative / Urobili: Negative   Blood: x / Protein: 100 / Nitrite: Negative   Leuk Esterase: Moderate / RBC: 0-4 /HPF / WBC 26-50 /HPF   Sq Epi: x / Non Sq Epi: Neg.-Few / Bacteria: Moderate /HPF      CULTURES:      Physical Examination:    General: No acute distress.      HEENT: Pupils equal, reactive to light.  Symmetric.    PULM: Clear to auscultation bilaterally, no significant sputum production    CVS: Regular rate and rhythm, no murmurs, rubs, or gallops    ABD: Soft, nondistended, nontender, normoactive bowel sounds, no masses    EXT: No edema, nontender    SKIN: Warm and well perfused, no rashes noted.

## 2018-08-26 NOTE — ED ADULT NURSE REASSESSMENT NOTE - NS ED NURSE REASSESS COMMENT FT1
Blood Sugar trending down, pt remains baseline mental status.   ICU consult in progress.   Additional 10 Units of Insulin and 1 L NS given as per order.   Pending f/s repeat post fluids, and decision on admission there after.   Will continue to monitor further.

## 2018-08-27 DIAGNOSIS — I10 ESSENTIAL (PRIMARY) HYPERTENSION: ICD-10-CM

## 2018-08-27 DIAGNOSIS — R78.81 BACTEREMIA: ICD-10-CM

## 2018-08-27 DIAGNOSIS — N39.0 URINARY TRACT INFECTION, SITE NOT SPECIFIED: ICD-10-CM

## 2018-08-27 DIAGNOSIS — E11.01 TYPE 2 DIABETES MELLITUS WITH HYPEROSMOLARITY WITH COMA: ICD-10-CM

## 2018-08-27 DIAGNOSIS — E87.2 ACIDOSIS: ICD-10-CM

## 2018-08-27 DIAGNOSIS — E86.1 HYPOVOLEMIA: ICD-10-CM

## 2018-08-27 DIAGNOSIS — N17.9 ACUTE KIDNEY FAILURE, UNSPECIFIED: ICD-10-CM

## 2018-08-27 LAB
ANION GAP SERPL CALC-SCNC: 11 MMOL/L — SIGNIFICANT CHANGE UP (ref 5–17)
BUN SERPL-MCNC: 22 MG/DL — SIGNIFICANT CHANGE UP (ref 7–23)
CALCIUM SERPL-MCNC: 8.6 MG/DL — SIGNIFICANT CHANGE UP (ref 8.4–10.5)
CHLORIDE SERPL-SCNC: 109 MMOL/L — HIGH (ref 96–108)
CHOLEST SERPL-MCNC: 145 MG/DL — SIGNIFICANT CHANGE UP (ref 10–199)
CO2 SERPL-SCNC: 24 MMOL/L — SIGNIFICANT CHANGE UP (ref 22–31)
CREAT SERPL-MCNC: 1.2 MG/DL — SIGNIFICANT CHANGE UP (ref 0.5–1.3)
E COLI DNA BLD POS QL NAA+NON-PROBE: SIGNIFICANT CHANGE UP
GLUCOSE BLDC GLUCOMTR-MCNC: 105 MG/DL — HIGH (ref 70–99)
GLUCOSE BLDC GLUCOMTR-MCNC: 105 MG/DL — HIGH (ref 70–99)
GLUCOSE BLDC GLUCOMTR-MCNC: 169 MG/DL — HIGH (ref 70–99)
GLUCOSE BLDC GLUCOMTR-MCNC: 86 MG/DL — SIGNIFICANT CHANGE UP (ref 70–99)
GLUCOSE SERPL-MCNC: 76 MG/DL — SIGNIFICANT CHANGE UP (ref 70–99)
GRAM STN FLD: SIGNIFICANT CHANGE UP
GRAM STN FLD: SIGNIFICANT CHANGE UP
HBA1C BLD-MCNC: 11.9 % — HIGH (ref 4–5.6)
HCT VFR BLD CALC: 24.5 % — LOW (ref 34.5–45)
HDLC SERPL-MCNC: 34 MG/DL — LOW
HGB BLD-MCNC: 7.8 G/DL — LOW (ref 11.5–15.5)
LACTATE SERPL-SCNC: 1.2 MMOL/L — SIGNIFICANT CHANGE UP (ref 0.7–2)
LIPID PNL WITH DIRECT LDL SERPL: 91 MG/DL — SIGNIFICANT CHANGE UP
MAGNESIUM SERPL-MCNC: 0.9 MG/DL — CRITICAL LOW (ref 1.6–2.6)
MCHC RBC-ENTMCNC: 29.7 PG — SIGNIFICANT CHANGE UP (ref 27–34)
MCHC RBC-ENTMCNC: 31.7 GM/DL — LOW (ref 32–36)
MCV RBC AUTO: 93.9 FL — SIGNIFICANT CHANGE UP (ref 80–100)
METHOD TYPE: SIGNIFICANT CHANGE UP
PHOSPHATE SERPL-MCNC: 2.7 MG/DL — SIGNIFICANT CHANGE UP (ref 2.5–4.5)
PLATELET # BLD AUTO: 126 K/UL — LOW (ref 150–400)
POTASSIUM SERPL-MCNC: 3.8 MMOL/L — SIGNIFICANT CHANGE UP (ref 3.5–5.3)
POTASSIUM SERPL-SCNC: 3.8 MMOL/L — SIGNIFICANT CHANGE UP (ref 3.5–5.3)
PROCALCITONIN SERPL-MCNC: 12.18 NG/ML — HIGH (ref 0.02–0.1)
RBC # BLD: 2.61 M/UL — LOW (ref 3.8–5.2)
RBC # FLD: 12.3 % — SIGNIFICANT CHANGE UP (ref 10.3–14.5)
SODIUM SERPL-SCNC: 144 MMOL/L — SIGNIFICANT CHANGE UP (ref 135–145)
SPECIMEN SOURCE: SIGNIFICANT CHANGE UP
SPECIMEN SOURCE: SIGNIFICANT CHANGE UP
TOTAL CHOLESTEROL/HDL RATIO MEASUREMENT: 4.3 RATIO — SIGNIFICANT CHANGE UP (ref 3.3–7.1)
TRIGL SERPL-MCNC: 98 MG/DL — SIGNIFICANT CHANGE UP (ref 10–149)
WBC # BLD: 8.6 K/UL — SIGNIFICANT CHANGE UP (ref 3.8–10.5)
WBC # FLD AUTO: 8.6 K/UL — SIGNIFICANT CHANGE UP (ref 3.8–10.5)

## 2018-08-27 PROCEDURE — 82962 GLUCOSE BLOOD TEST: CPT

## 2018-08-27 PROCEDURE — 85027 COMPLETE CBC AUTOMATED: CPT

## 2018-08-27 PROCEDURE — 84484 ASSAY OF TROPONIN QUANT: CPT

## 2018-08-27 PROCEDURE — 85610 PROTHROMBIN TIME: CPT

## 2018-08-27 PROCEDURE — 99285 EMERGENCY DEPT VISIT HI MDM: CPT | Mod: 25

## 2018-08-27 PROCEDURE — 82803 BLOOD GASES ANY COMBINATION: CPT

## 2018-08-27 PROCEDURE — 80048 BASIC METABOLIC PNL TOTAL CA: CPT

## 2018-08-27 PROCEDURE — 76770 US EXAM ABDO BACK WALL COMP: CPT | Mod: 26

## 2018-08-27 PROCEDURE — 83735 ASSAY OF MAGNESIUM: CPT

## 2018-08-27 PROCEDURE — 83036 HEMOGLOBIN GLYCOSYLATED A1C: CPT

## 2018-08-27 PROCEDURE — 96361 HYDRATE IV INFUSION ADD-ON: CPT

## 2018-08-27 PROCEDURE — 96374 THER/PROPH/DIAG INJ IV PUSH: CPT

## 2018-08-27 PROCEDURE — 85730 THROMBOPLASTIN TIME PARTIAL: CPT

## 2018-08-27 PROCEDURE — 83930 ASSAY OF BLOOD OSMOLALITY: CPT

## 2018-08-27 PROCEDURE — 71045 X-RAY EXAM CHEST 1 VIEW: CPT

## 2018-08-27 PROCEDURE — 96376 TX/PRO/DX INJ SAME DRUG ADON: CPT

## 2018-08-27 PROCEDURE — 84100 ASSAY OF PHOSPHORUS: CPT

## 2018-08-27 PROCEDURE — 80053 COMPREHEN METABOLIC PANEL: CPT

## 2018-08-27 PROCEDURE — 93005 ELECTROCARDIOGRAM TRACING: CPT

## 2018-08-27 RX ORDER — MAGNESIUM SULFATE 500 MG/ML
1 VIAL (ML) INJECTION ONCE
Qty: 0 | Refills: 0 | Status: COMPLETED | OUTPATIENT
Start: 2018-08-27 | End: 2018-08-27

## 2018-08-27 RX ORDER — MAGNESIUM SULFATE 500 MG/ML
1 VIAL (ML) INJECTION
Qty: 0 | Refills: 0 | Status: COMPLETED | OUTPATIENT
Start: 2018-08-27 | End: 2018-08-27

## 2018-08-27 RX ORDER — AMLODIPINE BESYLATE 2.5 MG/1
10 TABLET ORAL DAILY
Qty: 0 | Refills: 0 | Status: DISCONTINUED | OUTPATIENT
Start: 2018-08-27 | End: 2018-08-31

## 2018-08-27 RX ORDER — POTASSIUM CHLORIDE 20 MEQ
40 PACKET (EA) ORAL ONCE
Qty: 0 | Refills: 0 | Status: COMPLETED | OUTPATIENT
Start: 2018-08-27 | End: 2018-08-27

## 2018-08-27 RX ORDER — LISINOPRIL 2.5 MG/1
10 TABLET ORAL DAILY
Qty: 0 | Refills: 0 | Status: DISCONTINUED | OUTPATIENT
Start: 2018-08-27 | End: 2018-08-31

## 2018-08-27 RX ORDER — ERTAPENEM SODIUM 1 G/1
500 INJECTION, POWDER, LYOPHILIZED, FOR SOLUTION INTRAMUSCULAR; INTRAVENOUS EVERY 24 HOURS
Qty: 0 | Refills: 0 | Status: DISCONTINUED | OUTPATIENT
Start: 2018-08-27 | End: 2018-08-29

## 2018-08-27 RX ORDER — SODIUM CHLORIDE 9 MG/ML
1000 INJECTION, SOLUTION INTRAVENOUS
Qty: 0 | Refills: 0 | Status: DISCONTINUED | OUTPATIENT
Start: 2018-08-27 | End: 2018-08-28

## 2018-08-27 RX ADMIN — Medication 100 GRAM(S): at 03:35

## 2018-08-27 RX ADMIN — QUETIAPINE FUMARATE 25 MILLIGRAM(S): 200 TABLET, FILM COATED ORAL at 15:36

## 2018-08-27 RX ADMIN — Medication 100 GRAM(S): at 04:35

## 2018-08-27 RX ADMIN — SERTRALINE 25 MILLIGRAM(S): 25 TABLET, FILM COATED ORAL at 11:32

## 2018-08-27 RX ADMIN — Medication 2: at 11:32

## 2018-08-27 RX ADMIN — Medication 50 MILLIGRAM(S): at 21:16

## 2018-08-27 RX ADMIN — INSULIN GLARGINE 12 UNIT(S): 100 INJECTION, SOLUTION SUBCUTANEOUS at 21:32

## 2018-08-27 RX ADMIN — Medication 100 GRAM(S): at 20:30

## 2018-08-27 RX ADMIN — Medication 40 MILLIEQUIVALENT(S): at 20:30

## 2018-08-27 RX ADMIN — PANTOPRAZOLE SODIUM 40 MILLIGRAM(S): 20 TABLET, DELAYED RELEASE ORAL at 11:31

## 2018-08-27 RX ADMIN — QUETIAPINE FUMARATE 25 MILLIGRAM(S): 200 TABLET, FILM COATED ORAL at 21:16

## 2018-08-27 RX ADMIN — PREGABALIN 1000 MICROGRAM(S): 225 CAPSULE ORAL at 17:57

## 2018-08-27 RX ADMIN — Medication 100 GRAM(S): at 09:38

## 2018-08-27 RX ADMIN — PREGABALIN 1000 MICROGRAM(S): 225 CAPSULE ORAL at 06:35

## 2018-08-27 RX ADMIN — QUETIAPINE FUMARATE 25 MILLIGRAM(S): 200 TABLET, FILM COATED ORAL at 06:35

## 2018-08-27 RX ADMIN — SODIUM CHLORIDE 75 MILLILITER(S): 9 INJECTION, SOLUTION INTRAVENOUS at 15:36

## 2018-08-27 RX ADMIN — ERTAPENEM SODIUM 100 MILLIGRAM(S): 1 INJECTION, POWDER, LYOPHILIZED, FOR SOLUTION INTRAMUSCULAR; INTRAVENOUS at 11:31

## 2018-08-27 NOTE — PROGRESS NOTE ADULT - PROBLEM SELECTOR PLAN 7
Multifactorial related to sepsis, hypovolemia, component Metformin toxicity ?, on underlying CKD  Continue to monitor renal function and electrolytes  Replace electrolytes as needed. Mg and K supplemented.  Monitor I&Os, daily weights  Renally dose meds  Avoid nephrotoxic agents

## 2018-08-27 NOTE — PROGRESS NOTE ADULT - PROBLEM SELECTOR PLAN 1
Afebrile, lactate cleared  Hemodynamically stable  On Ertapenem (PCN allergy)  F/u blood and urine culture sensitivities, narrow abx accordingly  ID following

## 2018-08-27 NOTE — PROGRESS NOTE ADULT - PROBLEM SELECTOR PLAN 4
Off insulin gtt, resolved   Continue with Lantus, may need to titrate dose due to poor PO intake  Monitor FS AC/HS and cover with ISS  HgbA1c = 11.9

## 2018-08-27 NOTE — PROGRESS NOTE ADULT - SUBJECTIVE AND OBJECTIVE BOX
Follow-up Critical Care Progress Note  Chief Complaint : Hyperglycemia    pt confused unable to provide  history or ROS at this time    Allergies :lovastatin (Unknown)  penicillin (Unknown)  simvastatin (Unknown)  statins (Unknown)      PAST MEDICAL & SURGICAL HISTORY:  Colon cancer: staus post colon resection  Dementia  Hypertension, unspecified type  Type 2 diabetes mellitus with complication, without long-term current use of insulin  History of colon resection: secondary to colon cancer, unable to determine date and extent of surgery at this time      Medications:  MEDICATIONS  (STANDING):  amLODIPine   Tablet 10 milliGRAM(s) Oral daily  cyanocobalamin 1000 MICROGram(s) Oral <User Schedule>  dextrose 5%. 1000 milliLiter(s) (50 mL/Hr) IV Continuous <Continuous>  dextrose 50% Injectable 12.5 Gram(s) IV Push once  dextrose 50% Injectable 25 Gram(s) IV Push once  dextrose 50% Injectable 25 Gram(s) IV Push once  insulin glargine Injectable (LANTUS) 12 Unit(s) SubCutaneous at bedtime  insulin lispro (HumaLOG) corrective regimen sliding scale   SubCutaneous Before meals and at bedtime  lactated ringers. 1000 milliLiter(s) (75 mL/Hr) IV Continuous <Continuous>  levoFLOXacin IVPB      levoFLOXacin IVPB 250 milliGRAM(s) IV Intermittent every 24 hours  lisinopril 10 milliGRAM(s) Oral daily  magnesium sulfate  IVPB 1 Gram(s) IV Intermittent once  pantoprazole    Tablet 40 milliGRAM(s) Oral daily  QUEtiapine 25 milliGRAM(s) Oral every 8 hours  sertraline 25 milliGRAM(s) Oral daily  traZODone 50 milliGRAM(s) Oral at bedtime    MEDICATIONS  (PRN):  acetaminophen  Suppository 650 milliGRAM(s) Rectal every 6 hours PRN For Temp greater than 38 C (100.4 F)  dextrose 40% Gel 15 Gram(s) Oral once PRN Blood Glucose LESS THAN 70 milliGRAM(s)/deciliter  glucagon  Injectable 1 milliGRAM(s) IntraMuscular once PRN Glucose LESS THAN 70 milligrams/deciliter      LABS:                        7.8    8.6   )-----------( 126      ( 27 Aug 2018 03:00 )             24.5     08-27    144  |  109<H>  |  22  ----------------------------<  76  3.8   |  24  |  1.20    Ca    8.6      27 Aug 2018 03:00  Phos  2.7       Mg     .9         TPro  7.6  /  Alb  3.4  /  TBili  0.5  /  DBili  x   /  AST  11  /  ALT  16  /  AlkPhos  77        CARDIAC MARKERS ( 26 Aug 2018 10:45 )  <.017 ng/mL / x     / x     / x     / x            PT/INR - ( 26 Aug 2018 10:45 )   PT: 12.1 sec;   INR: 1.09 ratio         PTT - ( 26 Aug 2018 10:45 )  PTT:29.4 sec  Urinalysis Basic - ( 26 Aug 2018 18:08 )    Color: Yellow / Appearance: Clear / S.010 / pH: x  Gluc: x / Ketone: Negative  / Bili: Negative / Urobili: Negative   Blood: x / Protein: 100 / Nitrite: Negative   Leuk Esterase: Moderate / RBC: 0-4 /HPF / WBC 26-50 /HPF   Sq Epi: x / Non Sq Epi: Neg.-Few / Bacteria: Moderate /HPF      Procalcitonin, Serum: 12.18 ng/mL (18 @ 15:51)          CULTURES: (if applicable)    Culture - Blood (collected 18 @ 15:54)  Source: .Blood Blood-Peripheral  Gram Stain (18 @ 08:23):    Growth in aerobic and anaerobic bottles: Gram Negative Rods  Preliminary Report (18 @ 08:23):    Growth in aerobic and anaerobic bottles: Gram Negative Rods    Culture - Blood (collected 18 @ 15:54)  Source: .Blood Blood-Peripheral  Gram Stain (18 @ 08:24):    Growth in aerobic and anaerobic bottles: Gram Negative Rods  Preliminary Report (18 @ 08:24):    Growth in aerobic and anaerobic bottles: Gram Negative Rods    "Due to technical problems, Proteus sp. will Not be reported as part of    the BCID panel until further notice"    ***Blood Panel PCR results on this specimen are available    approximately 3 hours after the Gram stain result.***    Gram stain, PCR, and/or culture results may not always    correspond due to difference in methodologies.    ************************************************************    This PCR assay was performed using [employer].    The following targets are tested for: Enterococcus,    vancomycin resistant enterococci, Listeria monocytogenes,    coagulase negative staphylococci, S. aureus,    methicillin resistant S. aureus, Streptococcus agalactiae    (Group B), S. pneumoniae, S. pyogenes (Group A),    Acinetobacter baumannii, Enterobacter cloacae, E. coli,    Klebsiella oxytoca, K. pneumoniae, Proteus sp.,    Serratia marcescens, Haemophilus influenzae,    Neisseria meningitidis, Pseudomonas aeruginosa, Candida    albicans, C. glabrata, C krusei, C parapsilosis,    C. tropicalis and the KPC resistance gene.      Lactic Acid Trend  18 @ 03:00   -   1.2  18 @ 18:20   -   4.5<HH>  18 @ 15:51   -   5.5<HH>        CAPILLARY BLOOD GLUCOSE      POCT Blood Glucose.: 86 mg/dL (27 Aug 2018 07:33)      RADIOLOGY  CXR:  < from: Xray Chest 1 View AP/PA (18 @ 11:31) >  Limited exam. Clear lungs.    < end of copied text >      VITALS:  T(C): 36.7 (18 @ 07:00), Max: 39.3 (18 @ 14:57)  T(F): 98.1 (18 @ 07:00), Max: 102.8 (18 @ 14:57)  HR: 85 (18 @ 09:00) (72 - 132)  BP: 109/65 (18 @ 09:00) (86/47 - 152/84)  BP(mean): 79 (18 @ 09:00) (59 - 90)  ABP: --  ABP(mean): --  RR: 8 (18 @ 09:00) (8 - 26)  SpO2: 100% (18 @ 04:00) (76% - 100%)  CVP(mm Hg): --  CVP(cm H2O): --    Ins and Outs     18 @ 07:01  -  18 @ 07:00  --------------------------------------------------------  IN: 2600 mL / OUT: 0 mL / NET: 2600 mL    18 @ 07:01  -  18 @ 09:30  --------------------------------------------------------  IN: 120 mL / OUT: 0 mL / NET: 120 mL        Height (cm): 152.4 (18 @ 09:00), 162.56 (18 @ 10:28)  Weight (kg): 48.5 (18 09:00), 54.4 (08-26-18 @ 10:28)  BMI (kg/m2): 20.9 (18 @ 09:00), 20.6 (18 10:28)        Physical Examination:  GENERAL:               Alert, Confused, No acute distress.    HEENT:                    Symmetric. No JVD, dry MM  PULM:                     Bilateral air entry, Clear to auscultation bilaterally, no significant sputum production, No Rales, No Rhonchi, No Wheezing  CVS:                         S1, S2,  + Murmur  ABD:                        Soft, nondistended, nontender, normoactive bowel sounds,   EXT:                         No edema, nontender, No Cyanosis or Clubbing   Vascular:                Warm Extremities, Normal Capillary refill, Normal Distal Pulses  SKIN:                       Warm and well perfused, no rashes noted.   NEURO:                  Alert, oriented, interactive, nonfocal, follows commands  PSYC:                      Calm, no Insight. Follow-up Critical Care Progress Note  Chief Complaint : Hyperglycemia    pt confused unable to provide  history or ROS at this time    Allergies :lovastatin (Unknown)  penicillin (Unknown)  simvastatin (Unknown)  statins (Unknown)      PAST MEDICAL & SURGICAL HISTORY:  Colon cancer: staus post colon resection  Dementia  Hypertension, unspecified type  Type 2 diabetes mellitus with complication, without long-term current use of insulin  History of colon resection: secondary to colon cancer, unable to determine date and extent of surgery at this time      Medications:  MEDICATIONS  (STANDING):  amLODIPine   Tablet 10 milliGRAM(s) Oral daily  cyanocobalamin 1000 MICROGram(s) Oral <User Schedule>  dextrose 5%. 1000 milliLiter(s) (50 mL/Hr) IV Continuous <Continuous>  dextrose 50% Injectable 12.5 Gram(s) IV Push once  dextrose 50% Injectable 25 Gram(s) IV Push once  dextrose 50% Injectable 25 Gram(s) IV Push once  insulin glargine Injectable (LANTUS) 12 Unit(s) SubCutaneous at bedtime  insulin lispro (HumaLOG) corrective regimen sliding scale   SubCutaneous Before meals and at bedtime  lactated ringers. 1000 milliLiter(s) (75 mL/Hr) IV Continuous <Continuous>  levoFLOXacin IVPB      levoFLOXacin IVPB 250 milliGRAM(s) IV Intermittent every 24 hours  lisinopril 10 milliGRAM(s) Oral daily  magnesium sulfate  IVPB 1 Gram(s) IV Intermittent once  pantoprazole    Tablet 40 milliGRAM(s) Oral daily  QUEtiapine 25 milliGRAM(s) Oral every 8 hours  sertraline 25 milliGRAM(s) Oral daily  traZODone 50 milliGRAM(s) Oral at bedtime    MEDICATIONS  (PRN):  acetaminophen  Suppository 650 milliGRAM(s) Rectal every 6 hours PRN For Temp greater than 38 C (100.4 F)  dextrose 40% Gel 15 Gram(s) Oral once PRN Blood Glucose LESS THAN 70 milliGRAM(s)/deciliter  glucagon  Injectable 1 milliGRAM(s) IntraMuscular once PRN Glucose LESS THAN 70 milligrams/deciliter      LABS:                        7.8    8.6   )-----------( 126      ( 27 Aug 2018 03:00 )             24.5     08-27    144  |  109<H>  |  22  ----------------------------<  76  3.8   |  24  |  1.20    Ca    8.6      27 Aug 2018 03:00  Phos  2.7       Mg     .9         TPro  7.6  /  Alb  3.4  /  TBili  0.5  /  DBili  x   /  AST  11  /  ALT  16  /  AlkPhos  77        CARDIAC MARKERS ( 26 Aug 2018 10:45 )  <.017 ng/mL / x     / x     / x     / x            PT/INR - ( 26 Aug 2018 10:45 )   PT: 12.1 sec;   INR: 1.09 ratio         PTT - ( 26 Aug 2018 10:45 )  PTT:29.4 sec  Urinalysis Basic - ( 26 Aug 2018 18:08 )    Color: Yellow / Appearance: Clear / S.010 / pH: x  Gluc: x / Ketone: Negative  / Bili: Negative / Urobili: Negative   Blood: x / Protein: 100 / Nitrite: Negative   Leuk Esterase: Moderate / RBC: 0-4 /HPF / WBC 26-50 /HPF   Sq Epi: x / Non Sq Epi: Neg.-Few / Bacteria: Moderate /HPF      Procalcitonin, Serum: 12.18 ng/mL (18 @ 15:51)  18 @ 07:33   -  -- -- 86    18 @ 03:00   -  -- 76 --    18 @ 22:02   -  -- -- 120<H>    18 @ 16:54   -  -- -- 230<H>    18 @ 15:51   -  -- 163<H> --    18 @ 15:48   -  -- -- 181<H>    18 @ 14:55   -  -- -- 228<H>    18 @ 14:00   -  -- -- 285<H>    18 @ 12:32   -  -- -- 543<HH>    18 @ 10:45   -  -- 665<HH> --          Hemoglobin A1C, Whole Blood: 11.9 % <H> (18 @ 03:00)        CULTURES: (if applicable)    Culture - Blood (collected 18 @ 15:54)  Source: .Blood Blood-Peripheral  Gram Stain (18 @ 08:23):    Growth in aerobic and anaerobic bottles: Gram Negative Rods  Preliminary Report (18 @ 08:23):    Growth in aerobic and anaerobic bottles: Gram Negative Rods    Culture - Blood (collected 18 @ 15:54)  Source: .Blood Blood-Peripheral  Gram Stain (18 @ 08:24):    Growth in aerobic and anaerobic bottles: Gram Negative Rods  Preliminary Report (18 @ 08:24):    Growth in aerobic and anaerobic bottles: Gram Negative Rods    "Due to technical problems, Proteus sp. will Not be reported as part of    the BCID panel until further notice"    ***Blood Panel PCR results on this specimen are available    approximately 3 hours after the Gram stain result.***    Gram stain, PCR, and/or culture results may not always    correspond due to difference in methodologies.    ************************************************************    This PCR assay was performed using [employer].    The following targets are tested for: Enterococcus,    vancomycin resistant enterococci, Listeria monocytogenes,    coagulase negative staphylococci, S. aureus,    methicillin resistant S. aureus, Streptococcus agalactiae    (Group B), S. pneumoniae, S. pyogenes (Group A),    Acinetobacter baumannii, Enterobacter cloacae, E. coli,    Klebsiella oxytoca, K. pneumoniae, Proteus sp.,    Serratia marcescens, Haemophilus influenzae,    Neisseria meningitidis, Pseudomonas aeruginosa, Candida    albicans, C. glabrata, C krusei, C parapsilosis,    C. tropicalis and the KPC resistance gene.      Lactic Acid Trend  18 @ 03:00   -   1.2  18 @ 18:20   -   4.5<HH>  18 @ 15:51   -   5.5<HH>        CAPILLARY BLOOD GLUCOSE      POCT Blood Glucose.: 86 mg/dL (27 Aug 2018 07:33)      RADIOLOGY  CXR:  < from: Xray Chest 1 View AP/PA (18 @ 11:31) >  Limited exam. Clear lungs.    < end of copied text >      VITALS:  T(C): 36.7 (18 @ 07:00), Max: 39.3 (18 @ 14:57)  T(F): 98.1 (18 @ 07:00), Max: 102.8 (18 @ 14:57)  HR: 85 (18 @ 09:00) (72 - 132)  BP: 109/65 (18 @ 09:00) (86/47 - 152/84)  BP(mean): 79 (18 @ 09:00) (59 - 90)  ABP: --  ABP(mean): --  RR: 8 (18 @ 09:00) (8 - 26)  SpO2: 100% (18 @ 04:00) (76% - 100%)  CVP(mm Hg): --  CVP(cm H2O): --    Ins and Outs     18 @ 07:01  -  18 @ 07:00  --------------------------------------------------------  IN: 2600 mL / OUT: 0 mL / NET: 2600 mL    18 @ 07:01  -  18 @ 09:30  --------------------------------------------------------  IN: 120 mL / OUT: 0 mL / NET: 120 mL        Height (cm): 152.4 (18 @ 09:00), 162.56 (18 @ 10:28)  Weight (kg): 48.5 (18 @ 09:00), 54.4 (18 10:28)  BMI (kg/m2): 20.9 (18 @ 09:00), 20.6 (18 10:28)        Physical Examination:  GENERAL:               Alert, Confused, No acute distress.    HEENT:                    Symmetric. No JVD, dry MM  PULM:                     Bilateral air entry, Clear to auscultation bilaterally, no significant sputum production, No Rales, No Rhonchi, No Wheezing  CVS:                         S1, S2,  + Murmur  ABD:                        Soft, nondistended, nontender, normoactive bowel sounds,   EXT:                         No edema, nontender, No Cyanosis or Clubbing   Vascular:                Warm Extremities, Normal Capillary refill, Normal Distal Pulses  SKIN:                       Warm and well perfused, no rashes noted.   NEURO:                  Alert, oriented, interactive, nonfocal, follows commands  PSYC:                      Calm, no Insight.

## 2018-08-27 NOTE — DIETITIAN INITIAL EVALUATION ADULT. - OTHER INFO
Patient noted with evidence of severe protein/calorie malnutrition, Skin is intact Patient noted for much encouragement for consumption of meals will provide mechanical soft consistency when advancing diet.

## 2018-08-27 NOTE — PROGRESS NOTE ADULT - PROBLEM SELECTOR PLAN 6
Hypotension related to hypovolemia/dehydration/poor PO intake and severe sepsis, did not require vasopressors  BP responded to aggressive fluid resuscitation, ELISA improved

## 2018-08-27 NOTE — PROGRESS NOTE ADULT - PROBLEM SELECTOR PLAN 5
Multifactorial related to hypovolemia, severe sepsis, Metformin ELISA/ATN  Lactate cleared with aggressive fluid resuscitation  Hold metformin

## 2018-08-27 NOTE — PROGRESS NOTE ADULT - PROBLEM SELECTOR PLAN 9
Continue outpatient regimen Seroquel, Trazodone, Zoloft  Would consider augmenting regimen by adding Remeron given poor PO intake/failure to thrive

## 2018-08-27 NOTE — PROGRESS NOTE ADULT - SUBJECTIVE AND OBJECTIVE BOX
Patient is a 83y old  Female who presents with a chief complaint of     BRIEF HOSPITAL COURSE: 84 yo female, PMHx underlying dementia and uncontrolled diabetes arrives to ER with hyperglycemia to 600 (no GAP). Treated with insulin, and transferred to ICU for further care. On arrival to ICU was noted to have shaking rigors and fever of 102.8F. Also, lactate elevated to 5.5 (down to 4.4 with fluids). Urinalysis reveals likely urinary source of severe sepsis. Found to have E. coli bacteremia and UTI.      Events last 24 hours: BP improved, hemodynamically stable. Blood glucose controlled off insulin gtt. Lactate cleared. Patient states she feels a bit better today. Reported to have poor PO intake      PAST MEDICAL & SURGICAL HISTORY:  Colon cancer: staus post colon resection  Dementia  Hypertension, unspecified type  Type 2 diabetes mellitus with complication, without long-term current use of insulin  History of colon resection: secondary to colon cancer, unable to determine date and extent of surgery at this time      SOCIAL HISTORY: resides in [facility], unable to obtain further history due to dementia      Review of Systems: Offers no complaints. Due to dementia, subjective information was not able to be obtained from the patient. History was obtained, to the extent possible, from review of the chart and collateral sources of information.      Medications:  ertapenem  IVPB 500 milliGRAM(s) IV Intermittent every 24 hours  amLODIPine   Tablet 10 milliGRAM(s) Oral daily  lisinopril 10 milliGRAM(s) Oral daily  acetaminophen  Suppository 650 milliGRAM(s) Rectal every 6 hours PRN  QUEtiapine 25 milliGRAM(s) Oral every 8 hours  sertraline 25 milliGRAM(s) Oral daily  traZODone 50 milliGRAM(s) Oral at bedtime  pantoprazole    Tablet 40 milliGRAM(s) Oral daily  dextrose 40% Gel 15 Gram(s) Oral once PRN  dextrose 50% Injectable 12.5 Gram(s) IV Push once  dextrose 50% Injectable 25 Gram(s) IV Push once  dextrose 50% Injectable 25 Gram(s) IV Push once  glucagon  Injectable 1 milliGRAM(s) IntraMuscular once PRN  insulin glargine Injectable (LANTUS) 12 Unit(s) SubCutaneous at bedtime  insulin lispro (HumaLOG) corrective regimen sliding scale   SubCutaneous Before meals and at bedtime  cyanocobalamin 1000 MICROGram(s) Oral <User Schedule>  dextrose 5%. 1000 milliLiter(s) IV Continuous <Continuous>  lactated ringers. 1000 milliLiter(s) IV Continuous <Continuous>      ICU Vital Signs Last 24 Hrs  T(C): 36.8 (27 Aug 2018 15:00), Max: 37 (27 Aug 2018 11:00)  T(F): 98.2 (27 Aug 2018 15:00), Max: 98.6 (27 Aug 2018 11:00)  HR: 82 (27 Aug 2018 20:00) (72 - 103)  BP: 122/50 (27 Aug 2018 20:00) (86/47 - 122/50)  BP(mean): 70 (27 Aug 2018 20:00) (59 - 81)  ABP: --  ABP(mean): --  RR: 17 (27 Aug 2018 20:00) (8 - 25)  SpO2: 88% (27 Aug 2018 20:00) (88% - 100%)      I&O's Detail    26 Aug 2018 07:01  -  27 Aug 2018 07:00  --------------------------------------------------------  IN:    Lactated Ringers IV Bolus: 500 mL    lactated ringers.: 825 mL    sodium chloride 0.9%: 1000 mL    sodium chloride 0.9%: 150 mL    Solution: 200 mL  Total IN: 2675 mL    OUT:  Total OUT: 0 mL    Total NET: 2675 mL      27 Aug 2018 07:01  -  27 Aug 2018 20:48  --------------------------------------------------------  IN:    lactated ringers.: 975 mL    Oral Fluid: 630 mL    Solution: 100 mL    Solution: 100 mL  Total IN: 1805 mL    OUT:  Total OUT: 0 mL    Total NET: 1805 mL        LABS:                        7.8    8.6   )-----------( 126      ( 27 Aug 2018 03:00 )             24.5     08-27    144  |  109<H>  |  22  ----------------------------<  76  3.8   |  24  |  1.20    Ca    8.6      27 Aug 2018 03:00  Phos  2.7       Mg     .9         TPro  7.6  /  Alb  3.4  /  TBili  0.5  /  DBili  x   /  AST  11  /  ALT  16  /  AlkPhos  77        CARDIAC MARKERS ( 26 Aug 2018 10:45 )  <.017 ng/mL / x     / x     / x     / x          CAPILLARY BLOOD GLUCOSE      POCT Blood Glucose.: 105 mg/dL (27 Aug 2018 17:03)    PT/INR - ( 26 Aug 2018 10:45 )   PT: 12.1 sec;   INR: 1.09 ratio         PTT - ( 26 Aug 2018 10:45 )  PTT:29.4 sec  Urinalysis Basic - ( 26 Aug 2018 18:08 )    Color: Yellow / Appearance: Clear / S.010 / pH: x  Gluc: x / Ketone: Negative  / Bili: Negative / Urobili: Negative   Blood: x / Protein: 100 / Nitrite: Negative   Leuk Esterase: Moderate / RBC: 0-4 /HPF / WBC 26-50 /HPF   Sq Epi: x / Non Sq Epi: Neg.-Few / Bacteria: Moderate /HPF      CULTURES:  Culture Results:   >100,000 CFU/ml Escherichia coli ( @ 16:40)  Culture Results:   Growth in aerobic and anaerobic bottles: Gram Negative Rods  "Due to technical problems, Proteus sp. will Not be reported as part of  the BCID panel until further notice"  ***Blood Panel PCR results on this specimen are available  approximately 3 hours after the Gram stain result.***  Gram stain, PCR, and/or culture results may not always  correspond due to difference in methodologies.  ************************************************************  This PCR assay was performed using [employer].  The following targets are tested for: Enterococcus,  vancomycin resistant enterococci, Listeria monocytogenes,  coagulase negative staphylococci, S. aureus,  methicillin resistant S. aureus, Streptococcus agalactiae  (Group B), S. pneumoniae, S. pyogenes (Group A),  Acinetobacter baumannii, Enterobacter cloacae, E. coli,  Klebsiella oxytoca, K. pneumoniae, Proteus sp.,  Serratia marcescens, Haemophilus influenzae,  Neisseria meningitidis, Pseudomonas aeruginosa, Candida  albicans, C. glabrata, C krusei, C parapsilosis,  C. tropicalis and the KPC resistance gene. ( @ 15:54)  Culture Results:   Growth in aerobic and anaerobic bottles: Gram Negative Rods ( @ 15:54)      Physical Examination:    General: Elderly female lying in bed in no acute distress.      HEENT: Pupils equal, reactive to light.  Symmetric.    PULM: Clear to auscultation bilaterally, no significant sputum production    CVS: Regular rate and rhythm, no murmurs, rubs, or gallops    ABD: Soft, nondistended, nontender, normoactive bowel sounds, no masses    EXT: No edema, nontender    SKIN: Warm and well perfused, no rashes noted.    NEURO: Alert, oriented to self, interactive, nonfocal        RADIOLOGY: < from: US Kidney and Bladder (18 @ 18:08) >  EXAM:  US KIDNEYS AND BLADDER      PROCEDURE DATE:  2018        INTERPRETATION:  CLINICAL INFORMATION: UTI. Bacteremia. Dementia    TECHNIQUE: Transabdominal renal ultrasound was performed.    COMPARISON: The previous examinations are not available for comparison at   this time.    FINDINGS: The patient did not cooperate for the examination.    The right kidney measures 9.2 cm. The kidney is normal in size and   echogenicity. There is a cyst in the lower pole measuring 3.1 x 3.2 x 2.9   cm. No hydronephrosis or calculi are demonstrated.    The left kidney measures 10.2 cm. The kidney is normal in size and   echogenicity. No cysts, solid masses, or calculi are demonstrated. There   is mild left hydronephrosis.    The urinary bladder isnot well distended. The wall of the bladder   measures 3.2 mm. The prevoid volume is 73.75 mL. The patient did not void.    IMPRESSION: Somewhat limited examination due to the condition of the   patient. Mild left hydronephrosis. Right renal cyst.    RAUL HENSON M.D. ATTENDING RADIOLOGIST  This document has been electronically signed. Aug 27 2018  6:34PM        INVASIVE LINES: X   INDWELLING VENCES: X   VTE PROPHYLAXIS: N/A due to bleeding risk. SCDs  CAM ICU: +   CODE STATUS: FULL    TIME SPENT: 20 minutes assessing presenting problems of acute illness, which pose high probability of life threatening deterioration or end organ damage/dysfunction, as well as medical decision making including initiating plan of care, reviewing data, reviewing radiologic exams, discussing with multidisciplinary team, non-inclusive of procedures performed, discussing goals of care with patient/family

## 2018-08-27 NOTE — CONSULT NOTE ADULT - ASSESSMENT
Impression: Elderly female admitted yesterday for hyperglycemia, developed rigors and now known to have ecoli bacteremia. Her exam is unrevealing for a specific obvious source but she does have some pyuria so gu source suspected. Patient cannot give any information .     Plan  cover with ertapenem  await sensitivity  check urine culture, if same as blood then suspect gu source. If different must assume gi source and needs a ct abd and pelvis  supportive care per ccu team  us kidneys and bladder.

## 2018-08-27 NOTE — CHART NOTE - NSCHARTNOTEFT_GEN_A_CORE
at physician request:  family called and spoke to son.  updated him on gram negative bacteremia as consequence of likely translocation  from urinary source, hemodynamic stability, and current overall condition.  He will visit tomorrow.  he verbalized that he appreciated the call.

## 2018-08-27 NOTE — CHART NOTE - NSCHARTNOTEFT_GEN_A_CORE
Upon Nutritional Assessment by the Registered Dietitian your patient was determined to meet criteria / has evidence of the following diagnosis/diagnoses:          [ ]  Mild Protein Calorie Malnutrition        [ ]  Moderate Protein Calorie Malnutrition        [x ] Severe Protein Calorie Malnutrition        [ ] Unspecified Protein Calorie Malnutrition        [ ] Underweight / BMI <19        [ ] Morbid Obesity / BMI > 40      Findings as based on:  [x ] Comprehensive nutrition assessment -BMI 17   [x ] Nutrition Focused Physical Exam, patient noted with loss of muscle mass( quadriceps, clavicle) & loss of body fat( orbital wasting)  [ ] Other:       Nutrition Plan/Recommendations:  Advance diet and provide Glucerna shake BID with meals        PROVIDER Section:     By signing this assessment you are acknowledging and agree with the diagnosis/diagnoses assigned by the Registered Dietitian    Comments:

## 2018-08-27 NOTE — CONSULT NOTE ADULT - SUBJECTIVE AND OBJECTIVE BOX
HPI:   Patient is a 83y female with dementia admitted yesterday for hyperglycemia then developed rigors and had blood cultures growing ecoli. We are called. Patient is demented and is not answering any of my questions in a way that answers my questions.     REVIEW OF SYSTEMS:  All other review of systems cannot get    PAST MEDICAL & SURGICAL HISTORY:  Colon cancer: staus post colon resection  Dementia  Hypertension, unspecified type  Type 2 diabetes mellitus with complication, without long-term current use of insulin  History of colon resection: secondary to colon cancer, unable to determine date and extent of surgery at this time      Allergies    lovastatin (Unknown)  penicillin (Unknown)  simvastatin (Unknown)  statins (Unknown)    Intolerances        Antimicrobials Day #  :1  ertapenem  IVPB 500 milliGRAM(s) IV Intermittent every 24 hours    Other Medications:  acetaminophen  Suppository 650 milliGRAM(s) Rectal every 6 hours PRN  amLODIPine   Tablet 10 milliGRAM(s) Oral daily  cyanocobalamin 1000 MICROGram(s) Oral <User Schedule>  dextrose 40% Gel 15 Gram(s) Oral once PRN  dextrose 5%. 1000 milliLiter(s) IV Continuous <Continuous>  dextrose 50% Injectable 12.5 Gram(s) IV Push once  dextrose 50% Injectable 25 Gram(s) IV Push once  dextrose 50% Injectable 25 Gram(s) IV Push once  glucagon  Injectable 1 milliGRAM(s) IntraMuscular once PRN  insulin glargine Injectable (LANTUS) 12 Unit(s) SubCutaneous at bedtime  insulin lispro (HumaLOG) corrective regimen sliding scale   SubCutaneous Before meals and at bedtime  lactated ringers. 1000 milliLiter(s) IV Continuous <Continuous>  lisinopril 10 milliGRAM(s) Oral daily  pantoprazole    Tablet 40 milliGRAM(s) Oral daily  QUEtiapine 25 milliGRAM(s) Oral every 8 hours  sertraline 25 milliGRAM(s) Oral daily  traZODone 50 milliGRAM(s) Oral at bedtime      FAMILY HISTORY:  No pertinent family history in first degree relatives      SOCIAL HISTORY:  Smoking: [ ]Yes [x ]No  ETOH: [ ]Yes [x ]No  Drug Use: [ ]Yes xx[ ]No   [x Single[ ]    T(F): 98.2 (18 @ 15:00), Max: 98.6 (18 @ 11:00)  HR: 83 (08-27-18 @ 16:00)  BP: 100/59 (18 @ 16:00)  RR: 19 (18 @ 16:00)  SpO2: 100% (18 @ 16:00)  Wt(kg): --    PHYSICAL EXAM:  General: alert, no acute distress  Eyes:  anicteric, no conjunctival injection, no discharge  Oropharynx: no lesions or injection 	  Neck: supple, without adenopathy  Lungs: basilar rales to auscultation  Heart: regular rate and rhythm; no murmur, rubs or gallops  Abdomen: soft, nondistended, nontender, without mass or organomegaly  Skin: no lesions  Extremities: no clubbing, cyanosis, or edema  Neurologic: alert, very confused, moves all extremities    LAB RESULTS:                        7.8    8.6   )-----------( 126      ( 27 Aug 2018 03:00 )             24.5         144  |  109<H>  |  22  ----------------------------<  76  3.8   |  24  |  1.20    Ca    8.6      27 Aug 2018 03:00  Phos  2.7       Mg     .9         TPro  7.6  /  Alb  3.4  /  TBili  0.5  /  DBili  x   /  AST  11  /  ALT  16  /  AlkPhos  77      LIVER FUNCTIONS - ( 26 Aug 2018 10:45 )  Alb: 3.4 g/dL / Pro: 7.6 g/dL / ALK PHOS: 77 U/L / ALT: 16 U/L DA / AST: 11 U/L / GGT: x           Urinalysis Basic - ( 26 Aug 2018 18:08 )    Color: Yellow / Appearance: Clear / S.010 / pH: x  Gluc: x / Ketone: Negative  / Bili: Negative / Urobili: Negative   Blood: x / Protein: 100 / Nitrite: Negative   Leuk Esterase: Moderate / RBC: 0-4 /HPF / WBC 26-50 /HPF   Sq Epi: x / Non Sq Epi: Neg.-Few / Bacteria: Moderate /HPF        MICROBIOLOGY:  RECENT CULTURES:   @ 15:54 .Blood Blood-Peripheral Blood Culture PCR    Growth in aerobic and anaerobic bottles: Gram Negative Rods  "Due to technical problems, Proteus sp. will Not be reported as part of  the BCID panel until further notice"  ***Blood Panel PCR results on this specimen are available  approximately 3 hours after the Gram stain result.***  Gram stain, PCR, and/or culture results may not always  correspond due to difference in methodologies.  ************************************************************  This PCR assay was performed using [employer].  The following targets are tested for: Enterococcus,  vancomycin resistant enterococci, Listeria monocytogenes,  coagulase negative staphylococci, S. aureus,  methicillin resistant S. aureus, Streptococcus agalactiae  (Group B), S. pneumoniae, S. pyogenes (Group A),  Acinetobacter baumannii, Enterobacter cloacae, E. coli,  Klebsiella oxytoca, K. pneumoniae, Proteus sp.,  Serratia marcescens, Haemophilus influenzae,  Neisseria meningitidis, Pseudomonas aeruginosa, Candida  albicans, C. glabrata, C krusei, C parapsilosis,  C. tropicalis and the KPC resistance gene.    Growth in aerobic and anaerobic bottles: Gram Negative Rods          RADIOLOGY REVIEWED:    < from: Xray Chest 1 View AP/PA (18 @ 11:31) >  IMPRESSION:    Limited exam. Clear lungs.      < end of copied text >    Impression:          Recommendations:

## 2018-08-27 NOTE — PROGRESS NOTE ADULT - ASSESSMENT
Assessment  1. S/P Septic shock due to UTI and GNR Bacteremia due to severe lactic acidosis that was not evident in ED but occurred shortly after transfer to ICU with Fever.           Lactic acidosis resolved   2. Hyperglycemia likely worsened by UTI, but pt has had recent ED admission for hyperglycemia, suspect underlying poorly controlled DM2           Hemoglobin A1C, Whole Blood: 11.9 % <H> (08-27-18 @ 03:00)      08-27-18 @ 07:33   -  -- -- 86        08-27-18 @ 03:00   -  -- 76 --        08-26-18 @ 22:02   -  -- -- 120<H>        08-26-18 @ 16:54   -  -- -- 230<H>        08-26-18 @ 15:51   -  -- 163<H> --        08-26-18 @ 15:48   -  -- -- 181<H>        08-26-18 @ 14:55   -  -- -- 228<H>        08-26-18 @ 14:00   -  -- -- 285<H>        08-26-18 @ 12:32   -  -- -- 543<HH>        08-26-18 @ 10:45   -  -- 665<HH> --            IMPROVE VTE Individual Risk Assessment    RISK                                                                Points    [  ] Previous VTE                                                  3    [  ] Thrombophilia                                               2    [  ] Lower limb paralysis                                      2        (unable to hold up >15 seconds)      [  ] Current Cancer                                              2         (within 6 months)    [  ] Immobilization > 24 hrs                                1    [  ] ICU/CCU stay > 24 hours                              1    [  ] Age > 60                                                      1    IMPROVE VTE Score __1_______      83 year old female with PMH HTN, colon CA DM2 and dementia presenting to ED from assisted living with non-anion gap hyperglycemia.     Problem/Plan - 1:  ·  Problem: Type 2 diabetes mellitus with complication, without long-term current use of insulin.  Plan: Monitor blood sugar, IV insulin to control acute hyperglycemia, may need infusion.  Dose SQ Lantus with RISS, check HA1c.      Problem/Plan - 2:  ·  Problem: Hypertension, unspecified type.  Plan: Continue Norvasc and Lisinopril, monitor BP.      Problem/Plan - 3:  ·  Problem: Dementia.  Plan: Continue Seroquel, Trazadone and Zolof, re-orient patient as needed to surroundings.      Problem/Plan - 4:  ·  Problem: Prophylactic measure.  Plan: OOB to chair with sequential stockings  PPI daily  PO diet as tolerated  Full Code status.        Attending Statement:  Patient seen and examined in ED    poor historian  d/w Dr. Guzman    Assessment  DM2 poorly controlled with severe hyperglycemia   dementia  HTN    Plan  IV insulin ? need for drip  check a1c  ivf  supportive care  overnight obs in icu if need drip. at this time does not.     I was physically present for the key portions of the evaluation and management (E/M) service provided.  I agree with the above history, physical, and plan which I have reviewed and edited where appropriate.    Electronic Signatures for Addendum Section:   Edenilson Tan () (Signed Addendum 26-Aug-2018 15:59)  	in ICU noted fever  will send cultures and start epimeric abx unsure of source at this time Assessment  1. S/P Septic shock due to UTI and GNR Bacteremia due to severe lactic acidosis that was not evident in ED but occurred shortly after transfer to ICU with Fever.           Lactic acidosis resolved            however pt on home metformin that can also explain lactic acidosis but clinically appeared to septic when came to ICU  2. Hyperglycemia likely worsened by UTI, but pt has had recent ED admission for hyperglycemia, suspect underlying poorly controlled DM2           Hemoglobin A1C, Whole Blood: 11.9 % <H> (08-27-18 @ 03:00)  3. Severe dementia unable to provide history or ROS  4.  H/O HTN off BP meds for soft BP      Plan  IV abx -as pt clinically responded to Levaquin will continue   ID eval  IVF  bp meds with hold Parameters   Insulin with glycemic control      PMD:		Dr. Guzman 		                   Notified(Date):  8/26  Family Updated: 		                                 Date:       Sedation & Analgesia:	home psyc meds  Diet/Nutrition:		oral diet  GI PPx:			PPI    DVT Ppx:		Hep sq  	RISK                                                          Points  	[ ] Previous VTE                                           	3  	[ ] Thrombophilia                                        	2  	[ ] Lower limb paralysis                              	2   	[ ] Current Cancer                                       	2   	[x ] Immobilization > 24 hrs                        	1  	[ x] ICU/CCU stay > 24 hours                       	1  	[ x] Age > 60                                                   	1  		Total:[ 3]      Activity:		      oob as tolerated           Head of Bed:               35-45 deg  Glycemic Control:        insulin/lantus    Lines: PIV  CENTRAL LINE: 	[ ] YES [ x] NO	                    LOCATION:   	                       DATE INSERTED:   	                    REMOVE:  [ ] YES [ ] NO    A-LINE:  	                [ ] YES [x ] NO                      LOCATION:   	                       DATE INSERTED: 		            REMOVE:  [ ] YES [ ] NO    VENCES: 		        [ ] YES [x ] NO  		                                       DATE INSERTED:		            REMOVE:  [ ] YES [ x] NO      Restraints were deemed necessary to prevent removal of life-sustaining devices [  ] YES   [ x   ]  NO    Disposition: ICU monitoring     Goals of Care: Full code

## 2018-08-27 NOTE — PROGRESS NOTE ADULT - ASSESSMENT
82 yo female, PMHx underlying dementia and uncontrolled diabetes, admitted with HHS, dehydration, ELISA, lactic acidosis on Metformin, spiked a temp found to have severe sepsis secondary to E. coli UTI / bacteremia, with poor PO intake.

## 2018-08-28 LAB
-  AMIKACIN: SIGNIFICANT CHANGE UP
-  AMOXICILLIN/CLAVULANIC ACID: SIGNIFICANT CHANGE UP
-  AMPICILLIN/SULBACTAM: SIGNIFICANT CHANGE UP
-  AMPICILLIN: SIGNIFICANT CHANGE UP
-  AZTREONAM: SIGNIFICANT CHANGE UP
-  CEFAZOLIN: SIGNIFICANT CHANGE UP
-  CEFEPIME: SIGNIFICANT CHANGE UP
-  CEFOXITIN: SIGNIFICANT CHANGE UP
-  CEFTRIAXONE: SIGNIFICANT CHANGE UP
-  CIPROFLOXACIN: SIGNIFICANT CHANGE UP
-  ERTAPENEM: SIGNIFICANT CHANGE UP
-  GENTAMICIN: SIGNIFICANT CHANGE UP
-  IMIPENEM: SIGNIFICANT CHANGE UP
-  LEVOFLOXACIN: SIGNIFICANT CHANGE UP
-  MEROPENEM: SIGNIFICANT CHANGE UP
-  NITROFURANTOIN: SIGNIFICANT CHANGE UP
-  PIPERACILLIN/TAZOBACTAM: SIGNIFICANT CHANGE UP
-  TIGECYCLINE: SIGNIFICANT CHANGE UP
-  TOBRAMYCIN: SIGNIFICANT CHANGE UP
-  TRIMETHOPRIM/SULFAMETHOXAZOLE: SIGNIFICANT CHANGE UP
ANION GAP SERPL CALC-SCNC: 7 MMOL/L — SIGNIFICANT CHANGE UP (ref 5–17)
BUN SERPL-MCNC: 18 MG/DL — SIGNIFICANT CHANGE UP (ref 7–23)
CALCIUM SERPL-MCNC: 8.7 MG/DL — SIGNIFICANT CHANGE UP (ref 8.4–10.5)
CHLORIDE SERPL-SCNC: 108 MMOL/L — SIGNIFICANT CHANGE UP (ref 96–108)
CO2 SERPL-SCNC: 25 MMOL/L — SIGNIFICANT CHANGE UP (ref 22–31)
CREAT SERPL-MCNC: 1.11 MG/DL — SIGNIFICANT CHANGE UP (ref 0.5–1.3)
CULTURE RESULTS: SIGNIFICANT CHANGE UP
GLUCOSE BLDC GLUCOMTR-MCNC: 141 MG/DL — HIGH (ref 70–99)
GLUCOSE BLDC GLUCOMTR-MCNC: 245 MG/DL — HIGH (ref 70–99)
GLUCOSE BLDC GLUCOMTR-MCNC: 263 MG/DL — HIGH (ref 70–99)
GLUCOSE BLDC GLUCOMTR-MCNC: 270 MG/DL — HIGH (ref 70–99)
GLUCOSE BLDC GLUCOMTR-MCNC: 64 MG/DL — LOW (ref 70–99)
GLUCOSE BLDC GLUCOMTR-MCNC: 66 MG/DL — LOW (ref 70–99)
GLUCOSE BLDC GLUCOMTR-MCNC: 66 MG/DL — LOW (ref 70–99)
GLUCOSE BLDC GLUCOMTR-MCNC: 97 MG/DL — SIGNIFICANT CHANGE UP (ref 70–99)
GLUCOSE SERPL-MCNC: 64 MG/DL — LOW (ref 70–99)
HCT VFR BLD CALC: 29.3 % — LOW (ref 34.5–45)
HGB BLD-MCNC: 9.2 G/DL — LOW (ref 11.5–15.5)
MAGNESIUM SERPL-MCNC: 2 MG/DL — SIGNIFICANT CHANGE UP (ref 1.6–2.6)
MCHC RBC-ENTMCNC: 29.8 PG — SIGNIFICANT CHANGE UP (ref 27–34)
MCHC RBC-ENTMCNC: 31.3 GM/DL — LOW (ref 32–36)
MCV RBC AUTO: 95.2 FL — SIGNIFICANT CHANGE UP (ref 80–100)
METHOD TYPE: SIGNIFICANT CHANGE UP
ORGANISM # SPEC MICROSCOPIC CNT: SIGNIFICANT CHANGE UP
ORGANISM # SPEC MICROSCOPIC CNT: SIGNIFICANT CHANGE UP
PHOSPHATE SERPL-MCNC: 2.7 MG/DL — SIGNIFICANT CHANGE UP (ref 2.5–4.5)
PLATELET # BLD AUTO: 129 K/UL — LOW (ref 150–400)
POTASSIUM SERPL-MCNC: 4.1 MMOL/L — SIGNIFICANT CHANGE UP (ref 3.5–5.3)
POTASSIUM SERPL-SCNC: 4.1 MMOL/L — SIGNIFICANT CHANGE UP (ref 3.5–5.3)
RBC # BLD: 3.08 M/UL — LOW (ref 3.8–5.2)
RBC # FLD: 12.6 % — SIGNIFICANT CHANGE UP (ref 10.3–14.5)
SODIUM SERPL-SCNC: 140 MMOL/L — SIGNIFICANT CHANGE UP (ref 135–145)
SPECIMEN SOURCE: SIGNIFICANT CHANGE UP
WBC # BLD: 8.8 K/UL — SIGNIFICANT CHANGE UP (ref 3.8–10.5)
WBC # FLD AUTO: 8.8 K/UL — SIGNIFICANT CHANGE UP (ref 3.8–10.5)

## 2018-08-28 RX ORDER — INSULIN GLARGINE 100 [IU]/ML
8 INJECTION, SOLUTION SUBCUTANEOUS AT BEDTIME
Qty: 0 | Refills: 0 | Status: DISCONTINUED | OUTPATIENT
Start: 2018-08-28 | End: 2018-08-31

## 2018-08-28 RX ORDER — HEPARIN SODIUM 5000 [USP'U]/ML
5000 INJECTION INTRAVENOUS; SUBCUTANEOUS EVERY 12 HOURS
Qty: 0 | Refills: 0 | Status: DISCONTINUED | OUTPATIENT
Start: 2018-08-28 | End: 2018-08-31

## 2018-08-28 RX ORDER — SODIUM CHLORIDE 9 MG/ML
1000 INJECTION, SOLUTION INTRAVENOUS
Qty: 0 | Refills: 0 | Status: DISCONTINUED | OUTPATIENT
Start: 2018-08-28 | End: 2018-08-28

## 2018-08-28 RX ADMIN — AMLODIPINE BESYLATE 10 MILLIGRAM(S): 2.5 TABLET ORAL at 05:30

## 2018-08-28 RX ADMIN — Medication 6: at 17:24

## 2018-08-28 RX ADMIN — ERTAPENEM SODIUM 100 MILLIGRAM(S): 1 INJECTION, POWDER, LYOPHILIZED, FOR SOLUTION INTRAMUSCULAR; INTRAVENOUS at 10:07

## 2018-08-28 RX ADMIN — Medication 4: at 11:53

## 2018-08-28 RX ADMIN — QUETIAPINE FUMARATE 25 MILLIGRAM(S): 200 TABLET, FILM COATED ORAL at 21:35

## 2018-08-28 RX ADMIN — HEPARIN SODIUM 5000 UNIT(S): 5000 INJECTION INTRAVENOUS; SUBCUTANEOUS at 17:25

## 2018-08-28 RX ADMIN — SODIUM CHLORIDE 50 MILLILITER(S): 9 INJECTION, SOLUTION INTRAVENOUS at 06:53

## 2018-08-28 RX ADMIN — QUETIAPINE FUMARATE 25 MILLIGRAM(S): 200 TABLET, FILM COATED ORAL at 14:56

## 2018-08-28 RX ADMIN — Medication 6: at 21:36

## 2018-08-28 RX ADMIN — PANTOPRAZOLE SODIUM 40 MILLIGRAM(S): 20 TABLET, DELAYED RELEASE ORAL at 11:29

## 2018-08-28 RX ADMIN — INSULIN GLARGINE 8 UNIT(S): 100 INJECTION, SOLUTION SUBCUTANEOUS at 21:35

## 2018-08-28 RX ADMIN — QUETIAPINE FUMARATE 25 MILLIGRAM(S): 200 TABLET, FILM COATED ORAL at 05:30

## 2018-08-28 RX ADMIN — SERTRALINE 25 MILLIGRAM(S): 25 TABLET, FILM COATED ORAL at 11:29

## 2018-08-28 RX ADMIN — Medication 50 MILLIGRAM(S): at 21:35

## 2018-08-28 RX ADMIN — PREGABALIN 1000 MICROGRAM(S): 225 CAPSULE ORAL at 05:30

## 2018-08-28 RX ADMIN — PREGABALIN 1000 MICROGRAM(S): 225 CAPSULE ORAL at 17:25

## 2018-08-28 RX ADMIN — LISINOPRIL 10 MILLIGRAM(S): 2.5 TABLET ORAL at 05:30

## 2018-08-28 NOTE — PROGRESS NOTE ADULT - SUBJECTIVE AND OBJECTIVE BOX
Follow-up Critical Care Progress Note  Chief Complaint : Hyperglycemia        No new events overnight.  Denies SOB/CP.   vitals stable  tolerating oral diet  noted episode of hypoglycemia  patient unable to give ROS      Allergies :lovastatin (Unknown)  penicillin (Unknown)  simvastatin (Unknown)  statins (Unknown)      PAST MEDICAL & SURGICAL HISTORY:  Colon cancer: staus post colon resection  Dementia  Hypertension, unspecified type  Type 2 diabetes mellitus with complication, without long-term current use of insulin  History of colon resection: secondary to colon cancer, unable to determine date and extent of surgery at this time      Medications:  MEDICATIONS  (STANDING):  amLODIPine   Tablet 10 milliGRAM(s) Oral daily  cyanocobalamin 1000 MICROGram(s) Oral <User Schedule>  dextrose 5% + lactated ringers. 1000 milliLiter(s) (50 mL/Hr) IV Continuous <Continuous>  dextrose 5%. 1000 milliLiter(s) (50 mL/Hr) IV Continuous <Continuous>  dextrose 50% Injectable 12.5 Gram(s) IV Push once  dextrose 50% Injectable 25 Gram(s) IV Push once  dextrose 50% Injectable 25 Gram(s) IV Push once  ertapenem  IVPB 500 milliGRAM(s) IV Intermittent every 24 hours  insulin glargine Injectable (LANTUS) 8 Unit(s) SubCutaneous at bedtime  insulin lispro (HumaLOG) corrective regimen sliding scale   SubCutaneous Before meals and at bedtime  lisinopril 10 milliGRAM(s) Oral daily  pantoprazole    Tablet 40 milliGRAM(s) Oral daily  QUEtiapine 25 milliGRAM(s) Oral every 8 hours  sertraline 25 milliGRAM(s) Oral daily  traZODone 50 milliGRAM(s) Oral at bedtime    MEDICATIONS  (PRN):  acetaminophen  Suppository 650 milliGRAM(s) Rectal every 6 hours PRN For Temp greater than 38 C (100.4 F)  dextrose 40% Gel 15 Gram(s) Oral once PRN Blood Glucose LESS THAN 70 milliGRAM(s)/deciliter  glucagon  Injectable 1 milliGRAM(s) IntraMuscular once PRN Glucose LESS THAN 70 milligrams/deciliter      LABS:                        9.2    8.8   )-----------( 129      ( 28 Aug 2018 06:07 )             29.3     08-28    140  |  108  |  18  ----------------------------<  64<L>  4.1   |  25  |  1.11    Ca    8.7      28 Aug 2018 06:07  Phos  2.7       Mg     2.0         TPro  7.6  /  Alb  3.4  /  TBili  0.5  /  DBili  x   /  AST  11  /  ALT  16  /  AlkPhos  77        CARDIAC MARKERS ( 26 Aug 2018 10:45 )  <.017 ng/mL / x     / x     / x     / x            PT/INR - ( 26 Aug 2018 10:45 )   PT: 12.1 sec;   INR: 1.09 ratio         PTT - ( 26 Aug 2018 10:45 )  PTT:29.4 sec  Urinalysis Basic - ( 26 Aug 2018 18:08 )    Color: Yellow / Appearance: Clear / S.010 / pH: x  Gluc: x / Ketone: Negative  / Bili: Negative / Urobili: Negative   Blood: x / Protein: 100 / Nitrite: Negative   Leuk Esterase: Moderate / RBC: 0-4 /HPF / WBC 26-50 /HPF   Sq Epi: x / Non Sq Epi: Neg.-Few / Bacteria: Moderate /HPF      Procalcitonin, Serum: 12.18 ng/mL (18 @ 15:51)          CULTURES: (if applicable)    Culture - Urine (collected 18 @ 16:40)  Source: .Urine Catheterized  Preliminary Report (18 @ 18:52):    >100,000 CFU/ml Escherichia coli    Culture - Blood (collected 18 @ 15:54)  Source: .Blood Blood-Peripheral  Gram Stain (18 @ 08:23):    Growth in aerobic and anaerobic bottles: Gram Negative Rods  Preliminary Report (18 @ 08:26):    Growth in aerobic and anaerobic bottles: Escherichia coli    Culture - Blood (collected 18 @ 15:54)  Source: .Blood Blood-Peripheral  Gram Stain (18 @ 08:24):    Growth in aerobic and anaerobic bottles: Gram Negative Rods  Preliminary Report (18 @ 08:26):    Growth in aerobic and anaerobic bottles: Escherichia coli    "Due to technical problems, Proteus sp. will Not be reported as part of    the BCID panel until further notice"    ***Blood Panel PCR results on this specimen are available    approximately 3 hours after the Gram stain result.***    Gram stain, PCR, and/or culture results may not always    correspond due to difference in methodologies.    ************************************************************    This PCR assay was performed using [employer].    The following targets are tested for: Enterococcus,    vancomycin resistant enterococci, Listeria monocytogenes,    coagulase negative staphylococci, S. aureus,    methicillin resistant S. aureus, Streptococcus agalactiae    (Group B), S. pneumoniae, S.pyogenes (Group A),    Acinetobacter baumannii, Enterobacter cloacae, E. coli,    Klebsiella oxytoca, K. pneumoniae, Proteus sp.,    Serratia marcescens, Haemophilus influenzae,    Neisseria meningitidis, Pseudomonas aeruginosa, Candida    albicans, C. glabrata, C krusei, C parapsilosis,    C. tropicalis and the KPC resistance gene.  Organism: Blood Culture PCR (18 @ 10:03)  Organism: Blood Culture PCR (18 @ 10:03)      -  Escherichia coli: Detec      Method Type: PCR            CAPILLARY BLOOD GLUCOSE  Glucose Trend  18 @ 07:16   -  -- -- 141<H>  18 @ 06:48   -  -- -- 97  18 @ 06:24   -  -- -- 66<L>  18 @ 06:07   -  -- 64<L> --  18 @ 06:02   -  -- -- 64<L>  18 @ 05:58   -  -- -- 66<L>  18 @ 21:22   -  -- -- 105<H>  18 @ 17:03   -  -- -- 105<H>  18 @ 11:25   -  -- -- 169<H>  18 @ 07:33   -  -- -- 86    Hemoglobin A1C, Whole Blood: 11.9 % <H> (18 @ 03:00)      POCT Blood Glucose.: 141 mg/dL (28 Aug 2018 07:16)      VITALS:  T(C): 36.7 (18 @ 08:00), Max: 37.3 (18 @ 21:00)  T(F): 98 (18 @ 08:00), Max: 99.2 (18 @ 21:00)  HR: 87 (18:) (66 - 94)  BP: 105/71 (18 08:00) (87/56 - 122/50)  BP(mean): 82 (18 08:00) (64 - 94)  ABP: --  ABP(mean): --  RR: 15 (18 @ 08:00) (8 - 25)  SpO2: 92% (18:00) (88% - 100%)  CVP(mm Hg): --  CVP(cm H2O): --    Ins and Outs     18 @ 07:01  -  18 @ 07:00  --------------------------------------------------------  IN: 2630 mL / OUT: 0 mL / NET: 2630 mL    18 @ 07:01  -  18 @ 09:01  --------------------------------------------------------  IN: 300 mL / OUT: 0 mL / NET: 300 mL        Height (cm): 162.56 (18 @ 10:28)  Weight (kg): 54.4 (18 10:28)  BMI (kg/m2): 20.6 (08-26-18 @ 10:28)

## 2018-08-28 NOTE — PROGRESS NOTE ADULT - ASSESSMENT
Physical Examination:  GENERAL:               Alert, Confused, No acute distress.    HEENT:                    Symmetric. No JVD, dry MM  PULM:                     Bilateral air entry, Clear to auscultation bilaterally, no significant sputum production, No Rales, No Rhonchi, No Wheezing  CVS:                         S1, S2,  + Murmur  ABD:                        Soft, nondistended, nontender, normoactive bowel sounds,   EXT:                         No edema, nontender, No Cyanosis or Clubbing   Vascular:                Warm Extremities, Normal Capillary refill, Normal Distal Pulses  SKIN:                       Warm and well perfused, no rashes noted.   NEURO:                  Alert, oriented, interactive, nonfocal, follows commands  PSYC:                      Calm, no Insight.        Assessment  1. S/P Septic shock due to UTI and GNR Bacteremia due to severe lactic acidosis that was not evident in ED but occurred shortly after transfer to ICU with Fever.           Lactic acidosis resolved            however pt on home metformin that can also explain lactic acidosis but clinically appeared to septic when came to ICU  2. Hyperglycemia likely worsened by UTI/bacteremia, but pt has had recent ED admission for hyperglycemia, suspect underlying poorly controlled DM2           Hemoglobin A1C, Whole Blood: 11.9 % <H> (08-27-18 @ 03:00)  3. Severe dementia unable to provide history or ROS  4.  H/O HTN off BP meds for soft BP      Plan  IV abx -as per ID  IVF  bp meds with hold Parameters   Insulin with glycemic control     taper lantus to 8 units    PMD:		Dr. Guzman 		                   Notified(Date):  8/26  Family Updated:  son		                                 Date: 8/27      Sedation & Analgesia:	home psyc meds  Diet/Nutrition:		oral diet  GI PPx:			PPI    DVT Ppx:		Hep sq    Activity:		      oob as tolerated           Head of Bed:               35-45 deg  Glycemic Control:        insulin/lantus    Lines: PIV    Restraints were deemed necessary to prevent removal of life-sustaining devices [  ] YES   [ x   ]  NO    Disposition: transfer to [facility], case d/w     Goals of Care: Full code Physical Examination:  GENERAL:               Alert, Confused, No acute distress.    HEENT:                    Symmetric. No JVD, dry MM  PULM:                     Bilateral air entry, Clear to auscultation bilaterally, no significant sputum production, No Rales, No Rhonchi, No Wheezing  CVS:                         S1, S2,  + Murmur  ABD:                        Soft, nondistended, nontender, normoactive bowel sounds,   EXT:                         No edema, nontender, No Cyanosis or Clubbing   Vascular:                Warm Extremities, Normal Capillary refill, Normal Distal Pulses  SKIN:                       Warm and well perfused, no rashes noted.   NEURO:                  Alert, oriented, interactive, nonfocal, follows commands  PSYC:                      Calm, no Insight.        Assessment  1. S/P Septic shock due to UTI and GNR Bacteremia due to severe lactic acidosis that was not evident in ED but occurred shortly after transfer to ICU with Fever.           Lactic acidosis resolved            however pt on home metformin that can also explain lactic acidosis but clinically appeared to septic when came to ICU  2. Hyperglycemia likely worsened by UTI/bacteremia, but pt has had recent ED admission for hyperglycemia, suspect underlying poorly controlled DM2           Hemoglobin A1C, Whole Blood: 11.9 % <H> (08-27-18 @ 03:00)  3. Severe dementia unable to provide history or ROS  4.  H/O HTN off BP meds for soft BP      Plan  IV abx -as per ID  IVF  bp meds with hold Parameters   Insulin with glycemic control     taper lantus to 8 units    PMD:		Dr. Guzman 		                   Notified(Date):  8/26  Family Updated:  son		                                 Date: 8/27      Sedation & Analgesia:	home psyc meds  Diet/Nutrition:		oral diet  GI PPx:			PPI    DVT Ppx:		Hep sq    Activity:		      oob as tolerated           Head of Bed:               35-45 deg  Glycemic Control:        insulin/lantus    Lines: PIV    Restraints were deemed necessary to prevent removal of life-sustaining devices [  ] YES   [ x   ]  NO    Disposition: transfer to [facility] d/w Dr. Barragan who will accept patient upon transfer.     Goals of Care: Full code

## 2018-08-28 NOTE — PROGRESS NOTE ADULT - SUBJECTIVE AND OBJECTIVE BOX
CC: f/u for ecoli bacteremia    Patient reports she is ok today    REVIEW OF SYSTEMS:  All other review of systems negative (Comprehensive ROS)    Antimicrobials Day #  :  ertapenem  IVPB 500 milliGRAM(s) IV Intermittent every 24 hours    Other Medications Reviewed    T(F): 98 (18 @ 08:00), Max: 99.2 (18 @ 21:00)  HR: 87 (18 @ 08:00)  BP: 105/71 (18 @ 08:00)  RR: 15 (18 @ 08:00)  SpO2: 92% (18 @ 08:00)  Wt(kg): --    PHYSICAL EXAM:  General: alert, no acute distress  Eyes:  anicteric, no conjunctival injection, no discharge  Oropharynx: no lesions or injection 	  Neck: supple, without adenopathy  Lungs: clear to auscultation  Heart: regular rate and rhythm; no murmur, rubs or gallops  Abdomen: soft, nondistended, nontender, without mass or organomegaly  Skin: no lesions  Extremities: no clubbing, cyanosis, or edema  Neurologic: alert, moves all extremities    LAB RESULTS:                        9.2    8.8   )-----------( 129      ( 28 Aug 2018 06:07 )             29.3         140  |  108  |  18  ----------------------------<  64<L>  4.1   |  25  |  1.11    Ca    8.7      28 Aug 2018 06:07  Phos  2.7       Mg     2.0             Urinalysis Basic - ( 26 Aug 2018 18:08 )    Color: Yellow / Appearance: Clear / S.010 / pH: x  Gluc: x / Ketone: Negative  / Bili: Negative / Urobili: Negative   Blood: x / Protein: 100 / Nitrite: Negative   Leuk Esterase: Moderate / RBC: 0-4 /HPF / WBC 26-50 /HPF   Sq Epi: x / Non Sq Epi: Neg.-Few / Bacteria: Moderate /HPF      MICROBIOLOGY:  RECENT CULTURES:   @ 16:40 .Urine Catheterized     >100,000 CFU/ml Escherichia coli       @ 15:54 .Blood Blood-Peripheral Blood Culture PCR    Growth in aerobic and anaerobic bottles: Escherichia coli  "Due to technical problems, Proteus sp. will Not be reported as part of  the BCID panel until further notice"  ***Blood Panel PCR results on this specimen are available  approximately 3 hours after the Gram stain result.***  Gram stain, PCR, and/or culture results may not always  correspond due to difference in methodologies.  ************************************************************  This PCR assay was performed using [employer].  The following targets are tested for: Enterococcus,  vancomycin resistant enterococci, Listeria monocytogenes,  coagulase negative staphylococci, S. aureus,  methicillin resistant S. aureus, Streptococcus agalactiae  (Group B), S. pneumoniae, S.pyogenes (Group A),  Acinetobacter baumannii, Enterobacter cloacae, E. coli,  Klebsiella oxytoca, K. pneumoniae, Proteus sp.,  Serratia marcescens, Haemophilus influenzae,  Neisseria meningitidis, Pseudomonas aeruginosa, Candida  albicans, C. glabrata, C krusei, C parapsilosis,  C. tropicalis and the KPC resistance gene.    Growth in aerobic and anaerobic bottles: Gram Negative Rods        RADIOLOGY REVIEWED:      < from: US Kidney and Bladder (18 @ 18:08) >  IMPRESSION: Somewhat limited examination due to the condition of the   patient. Mild left hydronephrosis. Right renal cyst.          Plan:

## 2018-08-29 DIAGNOSIS — A41.81 SEPSIS DUE TO ENTEROCOCCUS: ICD-10-CM

## 2018-08-29 DIAGNOSIS — G30.9 ALZHEIMER'S DISEASE, UNSPECIFIED: ICD-10-CM

## 2018-08-29 DIAGNOSIS — N39.0 URINARY TRACT INFECTION, SITE NOT SPECIFIED: ICD-10-CM

## 2018-08-29 DIAGNOSIS — F32.9 MAJOR DEPRESSIVE DISORDER, SINGLE EPISODE, UNSPECIFIED: ICD-10-CM

## 2018-08-29 DIAGNOSIS — N13.30 UNSPECIFIED HYDRONEPHROSIS: ICD-10-CM

## 2018-08-29 DIAGNOSIS — Z29.9 ENCOUNTER FOR PROPHYLACTIC MEASURES, UNSPECIFIED: ICD-10-CM

## 2018-08-29 LAB
-  AMIKACIN: SIGNIFICANT CHANGE UP
-  AMPICILLIN/SULBACTAM: SIGNIFICANT CHANGE UP
-  AMPICILLIN: SIGNIFICANT CHANGE UP
-  AZTREONAM: SIGNIFICANT CHANGE UP
-  CEFAZOLIN: SIGNIFICANT CHANGE UP
-  CEFEPIME: SIGNIFICANT CHANGE UP
-  CEFOXITIN: SIGNIFICANT CHANGE UP
-  CEFTRIAXONE: SIGNIFICANT CHANGE UP
-  CIPROFLOXACIN: SIGNIFICANT CHANGE UP
-  ERTAPENEM: SIGNIFICANT CHANGE UP
-  GENTAMICIN: SIGNIFICANT CHANGE UP
-  IMIPENEM: SIGNIFICANT CHANGE UP
-  LEVOFLOXACIN: SIGNIFICANT CHANGE UP
-  MEROPENEM: SIGNIFICANT CHANGE UP
-  PIPERACILLIN/TAZOBACTAM: SIGNIFICANT CHANGE UP
-  TOBRAMYCIN: SIGNIFICANT CHANGE UP
-  TRIMETHOPRIM/SULFAMETHOXAZOLE: SIGNIFICANT CHANGE UP
ANION GAP SERPL CALC-SCNC: 8 MMOL/L — SIGNIFICANT CHANGE UP (ref 5–17)
BUN SERPL-MCNC: 21 MG/DL — SIGNIFICANT CHANGE UP (ref 7–23)
CALCIUM SERPL-MCNC: 9.2 MG/DL — SIGNIFICANT CHANGE UP (ref 8.4–10.5)
CHLORIDE SERPL-SCNC: 103 MMOL/L — SIGNIFICANT CHANGE UP (ref 96–108)
CO2 SERPL-SCNC: 24 MMOL/L — SIGNIFICANT CHANGE UP (ref 22–31)
CREAT SERPL-MCNC: 1.14 MG/DL — SIGNIFICANT CHANGE UP (ref 0.5–1.3)
CULTURE RESULTS: SIGNIFICANT CHANGE UP
CULTURE RESULTS: SIGNIFICANT CHANGE UP
GLUCOSE BLDC GLUCOMTR-MCNC: 174 MG/DL — HIGH (ref 70–99)
GLUCOSE BLDC GLUCOMTR-MCNC: 183 MG/DL — HIGH (ref 70–99)
GLUCOSE BLDC GLUCOMTR-MCNC: 220 MG/DL — HIGH (ref 70–99)
GLUCOSE BLDC GLUCOMTR-MCNC: 304 MG/DL — HIGH (ref 70–99)
GLUCOSE SERPL-MCNC: 182 MG/DL — HIGH (ref 70–99)
HCT VFR BLD CALC: 28.8 % — LOW (ref 34.5–45)
HGB BLD-MCNC: 9 G/DL — LOW (ref 11.5–15.5)
MCHC RBC-ENTMCNC: 29.6 PG — SIGNIFICANT CHANGE UP (ref 27–34)
MCHC RBC-ENTMCNC: 31.4 GM/DL — LOW (ref 32–36)
MCV RBC AUTO: 94.3 FL — SIGNIFICANT CHANGE UP (ref 80–100)
METHOD TYPE: SIGNIFICANT CHANGE UP
ORGANISM # SPEC MICROSCOPIC CNT: SIGNIFICANT CHANGE UP
PLATELET # BLD AUTO: 145 K/UL — LOW (ref 150–400)
POTASSIUM SERPL-MCNC: 4.5 MMOL/L — SIGNIFICANT CHANGE UP (ref 3.5–5.3)
POTASSIUM SERPL-SCNC: 4.5 MMOL/L — SIGNIFICANT CHANGE UP (ref 3.5–5.3)
RBC # BLD: 3.05 M/UL — LOW (ref 3.8–5.2)
RBC # FLD: 12.6 % — SIGNIFICANT CHANGE UP (ref 10.3–14.5)
SODIUM SERPL-SCNC: 135 MMOL/L — SIGNIFICANT CHANGE UP (ref 135–145)
SPECIMEN SOURCE: SIGNIFICANT CHANGE UP
SPECIMEN SOURCE: SIGNIFICANT CHANGE UP
WBC # BLD: 7.8 K/UL — SIGNIFICANT CHANGE UP (ref 3.8–10.5)
WBC # FLD AUTO: 7.8 K/UL — SIGNIFICANT CHANGE UP (ref 3.8–10.5)

## 2018-08-29 PROCEDURE — 74176 CT ABD & PELVIS W/O CONTRAST: CPT | Mod: 26

## 2018-08-29 PROCEDURE — 99233 SBSQ HOSP IP/OBS HIGH 50: CPT

## 2018-08-29 RX ORDER — INSULIN LISPRO 100/ML
3 VIAL (ML) SUBCUTANEOUS
Qty: 0 | Refills: 0 | Status: DISCONTINUED | OUTPATIENT
Start: 2018-08-29 | End: 2018-08-31

## 2018-08-29 RX ORDER — CEFTRIAXONE 500 MG/1
1 INJECTION, POWDER, FOR SOLUTION INTRAMUSCULAR; INTRAVENOUS EVERY 24 HOURS
Qty: 0 | Refills: 0 | Status: DISCONTINUED | OUTPATIENT
Start: 2018-08-29 | End: 2018-08-31

## 2018-08-29 RX ADMIN — CEFTRIAXONE 100 GRAM(S): 500 INJECTION, POWDER, FOR SOLUTION INTRAMUSCULAR; INTRAVENOUS at 17:26

## 2018-08-29 RX ADMIN — Medication 50 MILLIGRAM(S): at 22:10

## 2018-08-29 RX ADMIN — HEPARIN SODIUM 5000 UNIT(S): 5000 INJECTION INTRAVENOUS; SUBCUTANEOUS at 17:27

## 2018-08-29 RX ADMIN — Medication 2: at 08:19

## 2018-08-29 RX ADMIN — INSULIN GLARGINE 8 UNIT(S): 100 INJECTION, SOLUTION SUBCUTANEOUS at 22:15

## 2018-08-29 RX ADMIN — PANTOPRAZOLE SODIUM 40 MILLIGRAM(S): 20 TABLET, DELAYED RELEASE ORAL at 11:14

## 2018-08-29 RX ADMIN — Medication 8: at 22:10

## 2018-08-29 RX ADMIN — Medication 2: at 11:36

## 2018-08-29 RX ADMIN — SERTRALINE 25 MILLIGRAM(S): 25 TABLET, FILM COATED ORAL at 11:15

## 2018-08-29 RX ADMIN — ERTAPENEM SODIUM 100 MILLIGRAM(S): 1 INJECTION, POWDER, LYOPHILIZED, FOR SOLUTION INTRAMUSCULAR; INTRAVENOUS at 11:14

## 2018-08-29 RX ADMIN — Medication 3 UNIT(S): at 17:26

## 2018-08-29 RX ADMIN — PREGABALIN 1000 MICROGRAM(S): 225 CAPSULE ORAL at 17:26

## 2018-08-29 RX ADMIN — QUETIAPINE FUMARATE 25 MILLIGRAM(S): 200 TABLET, FILM COATED ORAL at 14:49

## 2018-08-29 RX ADMIN — Medication 4: at 17:26

## 2018-08-29 RX ADMIN — QUETIAPINE FUMARATE 25 MILLIGRAM(S): 200 TABLET, FILM COATED ORAL at 22:10

## 2018-08-29 NOTE — CHART NOTE - NSCHARTNOTEFT_GEN_A_CORE
NUTRITION FOLLOW UP    SOURCE: Patient [)   Family [ ]     other [X ]    DIET: consistent cho mechanical soft glucerna shakes 8 oz. BID    PATIENT REPORT[ ] nausea  [ ] vomiting [ ] diarrhea [ ] constipation  [ X]chewing problems [ ] swallowing issues  [ ] other: no GI distress    PO INTAKE:  < 50% [ ]   50-75%  [X ]   %  [X]  other :    SOURCE: for PO intake [] Patient [ ] family [ X] chart [ ] staff [ ] other    ENTERAL/PARENTERAL NUTRITION: n/a    CURRENT WEIGHT: Weight (kg): 54.4 (08-26 @ 10:28) - asked for daily weights    PERTINENT LABS:  Date: 28 Aug 2018 06:07  Hemoglobin 9.2    Hematocrit 29.3     Date: 08-28  Sodium 140  Potassium 4.1  Glucose Serum 64<L>  BUN 18      Creatinine    ACCUCHECK  POCT Blood Glucose.: 174 mg/dL (29 Aug 2018 07:38)  POCT Blood Glucose.: 263 mg/dL (28 Aug 2018 21:33)  POCT Blood Glucose.: 270 mg/dL (28 Aug 2018 16:35)  POCT Blood Glucose.: 245 mg/dL (28 Aug 2018 11:32)    - edema  SKIN: SKIN INTACT    ESTIMATED NEEDS:   [X] no change since previous assessment  [ ] recalculated:     PREVIOUS NUTRITION DIAGNOSIS: addressed    NUTRITION DIAGNOSIS is   [X] resolved, RD will follow as per nutrition department protocol.    NEW NUTRITION DIAGNOSIS: [X] not applicable    MONITORING AND EVALUATION:   Current diet order is appropriate and is well tolerated, but will monitor for any changes that may be needed    [X] PO intake [X] Tolerance to diet prescription [X] weights [X] follow up per protocol    NUTRITION RECOMMENDATIONS:    REINFORCE INCREASED PO INTAKE, INCL. SUPPLEMENTS( GLUCERNA BID)

## 2018-08-29 NOTE — PROGRESS NOTE ADULT - ASSESSMENT
Elderly female with ecoli bacteremia presumed gu source with left sided hydro and possible component of bladder obstruction as well. No stones seen with thick ureter raising concern for cancer.   Rec  change to ceftriaxone  will need 10 days  repeat blood cultures  urology evaluation to assess if left kidney empties and if needs cystoureteroscope, stent or pcn

## 2018-08-29 NOTE — PROGRESS NOTE ADULT - SUBJECTIVE AND OBJECTIVE BOX
Patient is a 83y old  Female who presents with a chief complaint of     Patient seen and examined at bedside. patient in chair NAD     ALLERGIES:  lovastatin (Unknown)  penicillin (Unknown)  simvastatin (Unknown)  statins (Unknown)    MEDICATIONS:  acetaminophen  Suppository 650 milliGRAM(s) Rectal every 6 hours PRN  amLODIPine   Tablet 10 milliGRAM(s) Oral daily  cyanocobalamin 1000 MICROGram(s) Oral <User Schedule>  dextrose 40% Gel 15 Gram(s) Oral once PRN  dextrose 5%. 1000 milliLiter(s) IV Continuous <Continuous>  dextrose 50% Injectable 12.5 Gram(s) IV Push once  dextrose 50% Injectable 25 Gram(s) IV Push once  dextrose 50% Injectable 25 Gram(s) IV Push once  ertapenem  IVPB 500 milliGRAM(s) IV Intermittent every 24 hours  glucagon  Injectable 1 milliGRAM(s) IntraMuscular once PRN  heparin  Injectable 5000 Unit(s) SubCutaneous every 12 hours  insulin glargine Injectable (LANTUS) 8 Unit(s) SubCutaneous at bedtime  insulin lispro (HumaLOG) corrective regimen sliding scale   SubCutaneous Before meals and at bedtime  lisinopril 10 milliGRAM(s) Oral daily  pantoprazole    Tablet 40 milliGRAM(s) Oral daily  QUEtiapine 25 milliGRAM(s) Oral every 8 hours  sertraline 25 milliGRAM(s) Oral daily  traZODone 50 milliGRAM(s) Oral at bedtime    Vital Signs Last 24 Hrs  T(F): 98 (29 Aug 2018 07:13), Max: 98.5 (28 Aug 2018 15:19)  HR: 67 (29 Aug 2018 07:13) (67 - 78)  BP: 114/56 (29 Aug 2018 07:13) (96/70 - 114/56)  RR: 14 (29 Aug 2018 07:13) (14 - 14)  SpO2: 100% (29 Aug 2018 07:13) (98% - 100%)  I&O's Summary    28 Aug 2018 07:01  -  29 Aug 2018 07:00  --------------------------------------------------------  IN: 710 mL / OUT: 0 mL / NET: 710 mL        PHYSICAL EXAM:  General: NAD, A/O x 1  ENT: MMM  Neck: Supple, No JVD  Lungs: Clear to auscultation bilaterally  Cardio: RRR, S1/S2, No murmurs  Abdomen: Soft, Nontender, Nondistended; Bowel sounds present  Extremities: No cyanosis, No edema    LABS:                        9.0    7.8   )-----------( 145      ( 29 Aug 2018 08:09 )             28.8         135  |  103  |  21  ----------------------------<  182  4.5   |  24  |  1.14    Ca    9.2      29 Aug 2018 08:09  Phos  2.7       Mg     2.0         TPro  7.6  /  Alb  3.4  /  TBili  0.5  /  DBili  x   /  AST  11  /  ALT  16  /  AlkPhos  77      eGFR if Non African American: 44 mL/min/1.73M2 (18 @ 08:09)  eGFR if : 51 mL/min/1.73M2 (18 @ 08:09)    PT/INR - ( 26 Aug 2018 10:45 )   PT: 12.1 sec;   INR: 1.09 ratio         PTT - ( 26 Aug 2018 10:45 )  PTT:29.4 sec  Lactate, Blood: 1.2 mmol/L ( @ 03:00)  Lactate, Blood: 4.5 mmol/L ( @ 18:20)  Lactate, Blood: 5.5 mmol/L ( @ 15:51)    CARDIAC MARKERS ( 26 Aug 2018 10:45 )  <.017 ng/mL / x     / x     / x     / x           Chol 145 mg/dL LDL 91 mg/dL HDL 34 mg/dL Trig 98 mg/dL    10:48 - VBG - pH: 7.36  | pCO2: 41    | pO2: <46   | Lactate:            CAPILLARY BLOOD GLUCOSE      POCT Blood Glucose.: 174 mg/dL (29 Aug 2018 07:38)  POCT Blood Glucose.: 263 mg/dL (28 Aug 2018 21:33)  POCT Blood Glucose.: 270 mg/dL (28 Aug 2018 16:35)  POCT Blood Glucose.: 245 mg/dL (28 Aug 2018 11:32)     [id number] 11.9    Urinalysis Basic - ( 26 Aug 2018 18:08 )    Color: Yellow / Appearance: Clear / S.010 / pH: x  Gluc: x / Ketone: Negative  / Bili: Negative / Urobili: Negative   Blood: x / Protein: 100 / Nitrite: Negative   Leuk Esterase: Moderate / RBC: 0-4 /HPF / WBC 26-50 /HPF   Sq Epi: x / Non Sq Epi: Neg.-Few / Bacteria: Moderate /HPF        Culture - Urine (collected 26 Aug 2018 16:40)  Source: .Urine Catheterized  Final Report (28 Aug 2018 17:22):    >100,000 CFU/ml Escherichia coli  Organism: Escherichia coli (28 Aug 2018 17:22)  Organism: Escherichia coli (28 Aug 2018 17:22)      -  Amikacin: S <=8      -  Amoxicillin/Clavulanic Acid: S <=8/4      -  Ampicillin: S 4 These ampicillin results predict results for amoxicillin      -  Ampicillin/Sulbactam: S <=4/2      -  Aztreonam: S <=4      -  Cefazolin: S <=2 For uncomplicated UTI with K. pneumoniae, E. coli, or P. mirablis: CHARMAINE <=16 is sensitive and CHARMAINE >=32 is resistant. This also predicts results for oral agents cefaclor, cefdinir, cefpodoxime, cefprozil, cefuroxime axetil, cephalexin and locarbef for uncomplicated UTI. Note that some isolates may be susceptible to these agents while testing resistant to cefazolin.      -  Cefepime: S <=2      -  Cefoxitin: S <=4      -  Ceftriaxone: S <=1 Enterobacter, Citrobacter, and Serratia may develop resistance during prolonged therapy      -  Ciprofloxacin: R >2      -  Ertapenem: S <=0.5      -  Gentamicin: S <=1      -  Imipenem: S <=1      -  Levofloxacin: R >4      -  Meropenem: S <=1      -  Nitrofurantoin: S <=32 Should not be used to treat pyelonephritis      -  Piperacillin/Tazobactam: S <=8      -  Tigecycline: S <=1      -  Tobramycin: S <=2      -  Trimethoprim/Sulfamethoxazole: R >2/38      Method Type: CHARMAINE    Culture - Blood (collected 26 Aug 2018 15:54)  Source: .Blood Blood-Peripheral  Gram Stain (27 Aug 2018 08:23):    Growth in aerobic and anaerobic bottles: Gram Negative Rods  Preliminary Report (28 Aug 2018 08:26):    Growth in aerobic and anaerobic bottles: Escherichia coli    Culture - Blood (collected 26 Aug 2018 15:54)  Source: .Blood Blood-Peripheral  Gram Stain (27 Aug 2018 08:24):    Growth in aerobic and anaerobic bottles: Gram Negative Rods  Preliminary Report (28 Aug 2018 08:26):    Growth in aerobic and anaerobic bottles: Escherichia coli    "Due to technical problems, Proteus sp. will Not be reported as part of    the BCID panel until further notice"    ***Blood Panel PCR results on this specimen are available    approximately 3 hours after the Gram stain result.***    Gram stain, PCR, and/or culture results may not always    correspond due to difference in methodologies.    ************************************************************    This PCR assay was performed using [employer].    The following targets are tested for: Enterococcus,    vancomycin resistant enterococci, Listeria monocytogenes,    coagulase negative staphylococci, S. aureus,    methicillin resistant S. aureus, Streptococcus agalactiae    (Group B), S. pneumoniae, S.pyogenes (Group A),    Acinetobacter baumannii, Enterobacter cloacae, E. coli,    Klebsiella oxytoca, K. pneumoniae, Proteus sp.,    Serratia marcescens, Haemophilus influenzae,    Neisseria meningitidis, Pseudomonas aeruginosa, Candida    albicans, C. glabrata, C krusei, C parapsilosis,    C. tropicalis and the KPC resistance gene.  Organism: Blood Culture PCR (27 Aug 2018 10:03)  Organism: Blood Culture PCR (27 Aug 2018 10:03)      -  Escherichia coli: Detec      Method Type: PCR        RADIOLOGY & ADDITIONAL TESTS:    Care Discussed with Consultants/Other Providers: Patient is a 83y old  Female who presents with a chief complaint of elevated blood sugar    Patient seen and examined at bedside. patient in chair NAD     ALLERGIES:  lovastatin (Unknown)  penicillin (Unknown)  simvastatin (Unknown)  statins (Unknown)    MEDICATIONS:  acetaminophen  Suppository 650 milliGRAM(s) Rectal every 6 hours PRN  amLODIPine   Tablet 10 milliGRAM(s) Oral daily  cyanocobalamin 1000 MICROGram(s) Oral <User Schedule>  dextrose 40% Gel 15 Gram(s) Oral once PRN  dextrose 5%. 1000 milliLiter(s) IV Continuous <Continuous>  dextrose 50% Injectable 12.5 Gram(s) IV Push once  dextrose 50% Injectable 25 Gram(s) IV Push once  dextrose 50% Injectable 25 Gram(s) IV Push once  ertapenem  IVPB 500 milliGRAM(s) IV Intermittent every 24 hours  glucagon  Injectable 1 milliGRAM(s) IntraMuscular once PRN  heparin  Injectable 5000 Unit(s) SubCutaneous every 12 hours  insulin glargine Injectable (LANTUS) 8 Unit(s) SubCutaneous at bedtime  insulin lispro (HumaLOG) corrective regimen sliding scale   SubCutaneous Before meals and at bedtime  lisinopril 10 milliGRAM(s) Oral daily  pantoprazole    Tablet 40 milliGRAM(s) Oral daily  QUEtiapine 25 milliGRAM(s) Oral every 8 hours  sertraline 25 milliGRAM(s) Oral daily  traZODone 50 milliGRAM(s) Oral at bedtime    Vital Signs Last 24 Hrs  T(F): 98 (29 Aug 2018 07:13), Max: 98.5 (28 Aug 2018 15:19)  HR: 67 (29 Aug 2018 07:13) (67 - 78)  BP: 114/56 (29 Aug 2018 07:13) (96/70 - 114/56)  RR: 14 (29 Aug 2018 07:13) (14 - 14)  SpO2: 100% (29 Aug 2018 07:13) (98% - 100%)  I&O's Summary    28 Aug 2018 07:01  -  29 Aug 2018 07:00  --------------------------------------------------------  IN: 710 mL / OUT: 0 mL / NET: 710 mL        PHYSICAL EXAM:  General: NAD, A/O x 1  ENT: MMM  Neck: Supple, No JVD  Lungs: Clear to auscultation bilaterally  Cardio: RRR, S1/S2, No murmurs  Abdomen: Soft, Nontender, Nondistended; Bowel sounds present  Extremities: No cyanosis, No edema    LABS:                        9.0    7.8   )-----------( 145      ( 29 Aug 2018 08:09 )             28.8         135  |  103  |  21  ----------------------------<  182  4.5   |  24  |  1.14    Ca    9.2      29 Aug 2018 08:09  Phos  2.7       Mg     2.0         TPro  7.6  /  Alb  3.4  /  TBili  0.5  /  DBili  x   /  AST  11  /  ALT  16  /  AlkPhos  77      eGFR if Non African American: 44 mL/min/1.73M2 (18 @ 08:09)  eGFR if : 51 mL/min/1.73M2 (18 @ 08:09)    PT/INR - ( 26 Aug 2018 10:45 )   PT: 12.1 sec;   INR: 1.09 ratio         PTT - ( 26 Aug 2018 10:45 )  PTT:29.4 sec  Lactate, Blood: 1.2 mmol/L ( @ 03:00)  Lactate, Blood: 4.5 mmol/L ( @ 18:20)  Lactate, Blood: 5.5 mmol/L ( @ 15:51)    CARDIAC MARKERS ( 26 Aug 2018 10:45 )  <.017 ng/mL / x     / x     / x     / x           Chol 145 mg/dL LDL 91 mg/dL HDL 34 mg/dL Trig 98 mg/dL    10:48 - VBG - pH: 7.36  | pCO2: 41    | pO2: <46   | Lactate:            CAPILLARY BLOOD GLUCOSE      POCT Blood Glucose.: 174 mg/dL (29 Aug 2018 07:38)  POCT Blood Glucose.: 263 mg/dL (28 Aug 2018 21:33)  POCT Blood Glucose.: 270 mg/dL (28 Aug 2018 16:35)  POCT Blood Glucose.: 245 mg/dL (28 Aug 2018 11:32)    08-27 [id number] 11.9    Urinalysis Basic - ( 26 Aug 2018 18:08 )    Color: Yellow / Appearance: Clear / S.010 / pH: x  Gluc: x / Ketone: Negative  / Bili: Negative / Urobili: Negative   Blood: x / Protein: 100 / Nitrite: Negative   Leuk Esterase: Moderate / RBC: 0-4 /HPF / WBC 26-50 /HPF   Sq Epi: x / Non Sq Epi: Neg.-Few / Bacteria: Moderate /HPF        Culture - Urine (collected 26 Aug 2018 16:40)  Source: .Urine Catheterized  Final Report (28 Aug 2018 17:22):    >100,000 CFU/ml Escherichia coli  Organism: Escherichia coli (28 Aug 2018 17:22)  Organism: Escherichia coli (28 Aug 2018 17:22)      -  Amikacin: S <=8      -  Amoxicillin/Clavulanic Acid: S <=8/4      -  Ampicillin: S 4 These ampicillin results predict results for amoxicillin      -  Ampicillin/Sulbactam: S <=4/2      -  Aztreonam: S <=4      -  Cefazolin: S <=2 For uncomplicated UTI with K. pneumoniae, E. coli, or P. mirablis: CHARMAINE <=16 is sensitive and CHARMAINE >=32 is resistant. This also predicts results for oral agents cefaclor, cefdinir, cefpodoxime, cefprozil, cefuroxime axetil, cephalexin and locarbef for uncomplicated UTI. Note that some isolates may be susceptible to these agents while testing resistant to cefazolin.      -  Cefepime: S <=2      -  Cefoxitin: S <=4      -  Ceftriaxone: S <=1 Enterobacter, Citrobacter, and Serratia may develop resistance during prolonged therapy      -  Ciprofloxacin: R >2      -  Ertapenem: S <=0.5      -  Gentamicin: S <=1      -  Imipenem: S <=1      -  Levofloxacin: R >4      -  Meropenem: S <=1      -  Nitrofurantoin: S <=32 Should not be used to treat pyelonephritis      -  Piperacillin/Tazobactam: S <=8      -  Tigecycline: S <=1      -  Tobramycin: S <=2      -  Trimethoprim/Sulfamethoxazole: R >2/38      Method Type: CHARMAINE    Culture - Blood (collected 26 Aug 2018 15:54)  Source: .Blood Blood-Peripheral  Gram Stain (27 Aug 2018 08:23):    Growth in aerobic and anaerobic bottles: Gram Negative Rods  Preliminary Report (28 Aug 2018 08:26):    Growth in aerobic and anaerobic bottles: Escherichia coli    Culture - Blood (collected 26 Aug 2018 15:54)  Source: .Blood Blood-Peripheral  Gram Stain (27 Aug 2018 08:24):    Growth in aerobic and anaerobic bottles: Gram Negative Rods  Preliminary Report (28 Aug 2018 08:26):    Growth in aerobic and anaerobic bottles: Escherichia coli    "Due to technical problems, Proteus sp. will Not be reported as part of    the BCID panel until further notice"    ***Blood Panel PCR results on this specimen are available    approximately 3 hours after the Gram stain result.***    Gram stain, PCR, and/or culture results may not always    correspond due to difference in methodologies.    ************************************************************    This PCR assay was performed using [employer].    The following targets are tested for: Enterococcus,    vancomycin resistant enterococci, Listeria monocytogenes,    coagulase negative staphylococci, S. aureus,    methicillin resistant S. aureus, Streptococcus agalactiae    (Group B), S. pneumoniae, S.pyogenes (Group A),    Acinetobacter baumannii, Enterobacter cloacae, E. coli,    Klebsiella oxytoca, K. pneumoniae, Proteus sp.,    Serratia marcescens, Haemophilus influenzae,    Neisseria meningitidis, Pseudomonas aeruginosa, Candida    albicans, C. glabrata, C krusei, C parapsilosis,    C. tropicalis and the KPC resistance gene.  Organism: Blood Culture PCR (27 Aug 2018 10:03)  Organism: Blood Culture PCR (27 Aug 2018 10:03)      -  Escherichia coli: Detec      Method Type: PCR        RADIOLOGY & ADDITIONAL TESTS:    Care Discussed with Consultants/Other Providers:

## 2018-08-29 NOTE — PROGRESS NOTE ADULT - ASSESSMENT
Physical Examination:  GENERAL:               Alert, Confused, No acute distress.    HEENT:                    Symmetric. No JVD, dry MM  PULM:                     Bilateral air entry, Clear to auscultation bilaterally, no significant sputum production, No Rales, No Rhonchi, No Wheezing  CVS:                         S1, S2,  + Murmur  ABD:                        Soft, nondistended, nontender, normoactive bowel sounds,   EXT:                         No edema, nontender, No Cyanosis or Clubbing   Vascular:                Warm Extremities, Normal Capillary refill, Normal Distal Pulses  SKIN:                       Warm and well perfused, no rashes noted.   NEURO:                  Alert, oriented, interactive, nonfocal, follows commands  PSYC:                      Calm, no Insight.        Assessment  1. S/P Septic shock due to UTI and GNR Bacteremia due to severe lactic acidosis that was not evident in ED but occurred shortly after transfer to ICU with Fever.           Lactic acidosis resolved            however pt on home metformin that can also explain lactic acidosis but clinically appeared to septic when came to ICU  2. CT showing Moderate left-sided hydroureteronephrosis, which may be chronic.  Correlate for bladder outlet obstruction or reflux nephropathy.  3. Hyperglycemia likely worsened by UTI/bacteremia, but pt has had recent ED admission for hyperglycemia, suspect underlying poorly controlled DM2           Hemoglobin A1C, Whole Blood: 11.9 % <H> (08-27-18 @ 03:00)  4. Severe dementia unable to provide history or ROS  5.  H/O HTN off BP meds for soft BP      Plan  IV abx -as per ID  IVF  bp meds with hold Parameters   Insulin with glycemic control  consider Urology consult    PMD:		Dr. Guzman 		                   Notified(Date):  8/26  Family Updated:  son		                                 Date: 8/27      Sedation & Analgesia:	home psyc meds  Diet/Nutrition:		oral diet  GI PPx:			PPI    DVT Ppx:		Hep sq    Activity:		      oob as tolerated           Head of Bed:               35-45 deg  Glycemic Control:        insulin/lantus    Lines: PIV    Restraints were deemed necessary to prevent removal of life-sustaining devices [  ] YES   [ x   ]  NO    Disposition: continue current care on sangeeta.    Goals of Care: Full code

## 2018-08-29 NOTE — PROGRESS NOTE ADULT - SUBJECTIVE AND OBJECTIVE BOX
CC: f/u for ecoli bacteremia    Patient reports she likes her food    REVIEW OF SYSTEMS:  All other review of systems negative (Comprehensive ROS)    Antimicrobials Day #  :3/10  cefTRIAXone   IVPB 1 Gram(s) IV Intermittent every 24 hours    Other Medications Reviewed    T(F): 98.9 (08-29-18 @ 15:57), Max: 98.9 (08-29-18 @ 15:57)  HR: 77 (08-29-18 @ 15:57)  BP: 105/57 (08-29-18 @ 15:57)  RR: 14 (08-29-18 @ 15:57)  SpO2: 99% (08-29-18 @ 15:57)  Wt(kg): --    PHYSICAL EXAM:  General: alert, no acute distress  Eyes:  anicteric, no conjunctival injection, no discharge  Oropharynx: no lesions or injection 	  Neck: supple, without adenopathy  Lungs: clear to auscultation  Heart: regular rate and rhythm; no murmur, rubs or gallops  Abdomen: soft, nondistended, nontender, without mass or organomegaly  Skin: no lesions  Extremities: no clubbing, cyanosis, or edema  Neurologic: alert, confused, moves all extremities    LAB RESULTS:                        9.0    7.8   )-----------( 145      ( 29 Aug 2018 08:09 )             28.8     08-29    135  |  103  |  21  ----------------------------<  182<H>  4.5   |  24  |  1.14    Ca    9.2      29 Aug 2018 08:09  Phos  2.7     08-28  Mg     2.0     08-28          MICROBIOLOGY:  RECENT CULTURES:  08-26 @ 16:40 .Urine Catheterized Escherichia coli    >100,000 CFU/ml Escherichia coli      08-26 @ 15:54 .Blood Blood-Peripheral Blood Culture PCR  Escherichia coli    Growth in aerobic and anaerobic bottles: Escherichia coli  "Due to technical problems, Proteus sp. will Not be reported as part of  the BCID panel until further notice"  ***Blood Panel PCR results on this specimen are available  approximately 3 hours after the Gram stain result.***  Gram stain, PCR, and/or culture results may not always  correspond due to difference in methodologies.  ************************************************************  This PCR assay was performed using [employer].  The following targets are tested for: Enterococcus,  vancomycin resistant enterococci, Listeria monocytogenes,  coagulase negative staphylococci, S. aureus,  methicillin resistant S. aureus, Streptococcus agalactiae  (Group B), S. pneumoniae, S.pyogenes (Group A),  Acinetobacter baumannii, Enterobacter cloacae, E. coli,  < from: CT Abdomen and Pelvis No Cont (08.29.18 @ 10:00) >  Impression:    Limited ex.    < end of copied text >  Klebsiella oxytoca, K. pneumoniae, Proteus sp.,  Serratia marcescens, Haemophilus influenzae,  Neisseria meningitidis, Pseudomonas aeruginosa, Candida  albicans, C. glabrata, C krusei, C parapsilosis,  C. tropicalis and the KPC resistance gene.    Growth in aerobic and anaerobic bottles: Gram Negative Rods        RADIOLOGY REVIEWED:  < from: CT Abdomen and Pelvis No Cont (08.29.18 @ 10:00) >  EXAM:  CT ABDOMEN AND PELVIS      PROCEDURE DATE:  08/29/2018        INTERPRETATION:  CT ABDOMEN AND PELVIS    CLINICAL INFORMATION:  Bacteremia/sepsis. Urinary tract infection.    PROCEDURE:  Using multislice helical CT, without intravenous or oral   contrast 2.5 mm sections were obtained from the domes of the diaphragm to   the ischial tuberosities.  Coronal reformatted images were performed.    COMPARISON: Renal ultrasound 8/27/2018 and CT angiogram of the abdomen   9/24/2017.    FINDINGS: Evaluation of the solid organ parenchyma is limited by the lack   of intravenous contrast.      There are small bilateral pleural effusions with underlying compressive   atelectasis/consolidation at the lung bases.    Streak artifact related to patient's arms degrades image quality limiting   evaluation.    There are a few small indeterminate hypodense lesions within the liver.  Patient is status post cholecystectomy.    There has been interval resolution of the previously noted splenic   subcapsular hematoma.  Surgical clips are present in the left upper quadrant.  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    There is moderate left-sided hydronephrosis and hydroureter to the level   of the urinary bladder, similar in appearance to prior exam. There is   probable ureteral wall thickening.  No obstructing ureteral calcification is noted.  There is mild to moderate left-sided perinephric stranding, similar to   prior exam.  There is atrophy of the renal cortex at the upper pole.  There is a right-sided renal cyst. No right-sided hydronephrosis is noted.    The urinary bladder is distended.    There is arteriosclerotic calcification of the abdominal aorta.  Multiple left-sided retroperitoneal surgical clips are noted.    Evaluation of the gastrointestinal tract is limited without distention.   There is retained particulate matter within the stomach which is   moderately distended. There is a moderate fecal load throughout the colon.  Extensive colonic diverticulosis is noted.    There are multilevel degenerative changes of the lumbar spine with   scoliosis.  There are prominent degenerative changes at both hip joints.    Impression:    Limited exam.    Moderate left-sided hydroureteronephrosis, which may be chronic.   Correlate for bladder outlet obstruction or reflux nephropathy.  No obstructing ureteral calculus noted.    Other findings as discussed above.        < end of copied text >    I

## 2018-08-29 NOTE — PROGRESS NOTE ADULT - ASSESSMENT
82 yo f admitted 8/26/18 pw septic shock due to esbl bactermia +lactic acidosis. Patient also was in [facility] uncontrolled T2dm hba1c 11.9. patient has advanced dementia without behavioral disturbance renal us showed hydronephrosis urology consult placed will get ct abd /pel. PT consult placed. Awaiting sensitives to change abx currently on ertapenem and responding appropriately

## 2018-08-29 NOTE — PROGRESS NOTE ADULT - SUBJECTIVE AND OBJECTIVE BOX
Follow-up Critical Care Progress Note  Chief Complaint : Hyperglycemia        No new events overnight.  Denies SOB/CP.       Allergies :lovastatin (Unknown)  penicillin (Unknown)  simvastatin (Unknown)  statins (Unknown)      PAST MEDICAL & SURGICAL HISTORY:  Colon cancer: staus post colon resection  Dementia  Hypertension, unspecified type  Type 2 diabetes mellitus with complication, without long-term current use of insulin  History of colon resection: secondary to colon cancer, unable to determine date and extent of surgery at this time      Medications:  MEDICATIONS  (STANDING):  amLODIPine   Tablet 10 milliGRAM(s) Oral daily  cyanocobalamin 1000 MICROGram(s) Oral <User Schedule>  dextrose 5%. 1000 milliLiter(s) (50 mL/Hr) IV Continuous <Continuous>  dextrose 50% Injectable 12.5 Gram(s) IV Push once  dextrose 50% Injectable 25 Gram(s) IV Push once  dextrose 50% Injectable 25 Gram(s) IV Push once  heparin  Injectable 5000 Unit(s) SubCutaneous every 12 hours  insulin glargine Injectable (LANTUS) 8 Unit(s) SubCutaneous at bedtime  insulin lispro (HumaLOG) corrective regimen sliding scale   SubCutaneous Before meals and at bedtime  insulin lispro Injectable (HumaLOG) 3 Unit(s) SubCutaneous three times a day before meals  lisinopril 10 milliGRAM(s) Oral daily  pantoprazole    Tablet 40 milliGRAM(s) Oral daily  QUEtiapine 25 milliGRAM(s) Oral every 8 hours  sertraline 25 milliGRAM(s) Oral daily  traZODone 50 milliGRAM(s) Oral at bedtime    MEDICATIONS  (PRN):  acetaminophen  Suppository 650 milliGRAM(s) Rectal every 6 hours PRN For Temp greater than 38 C (100.4 F)  dextrose 40% Gel 15 Gram(s) Oral once PRN Blood Glucose LESS THAN 70 milliGRAM(s)/deciliter  glucagon  Injectable 1 milliGRAM(s) IntraMuscular once PRN Glucose LESS THAN 70 milligrams/deciliter      LABS:                        9.0    7.8   )-----------( 145      ( 29 Aug 2018 08:09 )             28.8     08-29    135  |  103  |  21  ----------------------------<  182<H>  4.5   |  24  |  1.14    Ca    9.2      29 Aug 2018 08:09  Phos  2.7     08-28  Mg     2.0     08-28      Glucose Trend  08-29-18 @ 11:34   -  -- -- 183<H>  08-29-18 @ 08:09   -  -- 182<H> --  08-29-18 @ 07:38   -  -- -- 174<H>  08-28-18 @ 21:33   -  -- -- 263<H>  08-28-18 @ 16:35   -  -- -- 270<H>  08-28-18 @ 11:32   -  -- -- 245<H>  08-28-18 @ 07:16   -  -- -- 141<H>  08-28-18 @ 06:48   -  -- -- 97  08-28-18 @ 06:24   -  -- -- 66<L>  08-28-18 @ 06:07   -  -- 64<L> --    Hemoglobin A1C, Whole Blood: 11.9 % <H> (08-27-18 @ 03:00)              Procalcitonin, Serum: 12.18 ng/mL (08-26-18 @ 15:51)          CULTURES: (if applicable)    Culture - Urine (collected 08-26-18 @ 16:40)  Source: .Urine Catheterized  Final Report (08-28-18 @ 17:22):    >100,000 CFU/ml Escherichia coli  Organism: Escherichia coli (08-28-18 @ 17:22)  Organism: Escherichia coli (08-28-18 @ 17:22)      -  Amikacin: S <=8      -  Amoxicillin/Clavulanic Acid: S <=8/4      -  Ampicillin: S 4 These ampicillin results predict results for amoxicillin      -  Ampicillin/Sulbactam: S <=4/2      -  Aztreonam: S <=4      -  Cefazolin: S <=2 For uncomplicated UTI with K. pneumoniae, E. coli, or P. mirablis: CHARMAINE <=16 is sensitive and CHARMAINE >=32 is resistant. This also predicts results for oral agents cefaclor, cefdinir, cefpodoxime, cefprozil, cefuroxime axetil, cephalexin and locarbef for uncomplicated UTI. Note that some isolates may be susceptible to these agents while testing resistant to cefazolin.      -  Cefepime: S <=2      -  Cefoxitin: S <=4      -  Ceftriaxone: S <=1 Enterobacter, Citrobacter, and Serratia may develop resistance during prolonged therapy      -  Ciprofloxacin: R >2      -  Ertapenem: S <=0.5      -  Gentamicin: S <=1      -  Imipenem: S <=1      -  Levofloxacin: R >4      -  Meropenem: S <=1      -  Nitrofurantoin: S <=32 Should not be used to treat pyelonephritis      -  Piperacillin/Tazobactam: S <=8      -  Tigecycline: S <=1      -  Tobramycin: S <=2      -  Trimethoprim/Sulfamethoxazole: R >2/38      Method Type: CHARMAINE    Culture - Blood (collected 08-26-18 @ 15:54)  Source: .Blood Blood-Peripheral  Gram Stain (08-27-18 @ 08:23):    Growth in aerobic and anaerobic bottles: Gram Negative Rods  Final Report (08-29-18 @ 12:53):    Growth in aerobic and anaerobic bottles: Escherichia coli    See previous culture [id number]    Culture - Blood (collected 08-26-18 @ 15:54)  Source: .Blood Blood-Peripheral  Gram Stain (08-27-18 @ 08:24):    Growth in aerobic and anaerobic bottles: Gram Negative Rods  Final Report (08-29-18 @ 12:50):    Growth in aerobic and anaerobic bottles: Escherichia coli    "Due to technical problems, Proteus sp. will Not be reported as part of    the BCID panel until further notice"    ***Blood Panel PCR results on this specimen are available    approximately 3 hours after the Gram stain result.***    Gram stain, PCR, and/or culture results may not always    correspond due to difference in methodologies.    ************************************************************    This PCR assay was performed using [facility].    The following targets are tested for: Enterococcus,    vancomycin resistant enterococci, Listeria monocytogenes,    coagulase negative staphylococci, S. aureus,    methicillin resistant S. aureus, Streptococcus agalactiae    (Group B), S. pneumoniae, S.pyogenes (Group A),    Acinetobacter baumannii, Enterobacter cloacae, E. coli,    Klebsiella oxytoca, K. pneumoniae, Proteus sp.,    Serratia marcescens, Haemophilus influenzae,    Neisseria meningitidis, Pseudomonas aeruginosa, Candida    albicans, C. glabrata, C krusei, C parapsilosis,    C. tropicalis and the KPC resistance gene.  Organism: Blood Culture PCR  Escherichia coli (08-29-18 @ 12:50)  Organism: Escherichia coli (08-29-18 @ 12:50)      -  Amikacin: S <=8      -  Ampicillin: S 4 These ampicillin results predict results for amoxicillin      -  Ampicillin/Sulbactam: S <=4/2      -  Aztreonam: S <=4      -  Cefazolin: S <=2      -  Cefepime: S <=2      -  Cefoxitin: S <=4      -  Ceftriaxone: S <=1 Enterobacter, Citrobacter, and Serratia may develop resistance during prolonged therapy      -  Ciprofloxacin: R >2      -  Ertapenem: S <=0.5      -  Gentamicin: R >8      -  Imipenem: S <=1      -  Levofloxacin: R >4      -  Meropenem: S <=1      -  Piperacillin/Tazobactam: S <=8      -  Tobramycin: R >8      -  Trimethoprim/Sulfamethoxazole: R >2/38      Method Type: CHARMAINE  Organism: Blood Culture PCR (08-29-18 @ 12:50)      -  Escherichia coli: Detec      Method Type: PCR            CAPILLARY BLOOD GLUCOSE      POCT Blood Glucose.: 183 mg/dL (29 Aug 2018 11:34)    < from: CT Abdomen and Pelvis No Cont (08.29.18 @ 10:00) >    EXAM:  CT ABDOMEN AND PELVIS      PROCEDURE DATE:  08/29/2018        INTERPRETATION:  CT ABDOMEN AND PELVIS    CLINICAL INFORMATION:  Bacteremia/sepsis. Urinary tract infection.    PROCEDURE:  Using multislice helical CT, without intravenous or oral contrast 2.5 mm sections were obtained from the domes of the diaphragm to the ischial tuberosities.  Coronal reformatted images were performed.    COMPARISON: Renal ultrasound 8/27/2018 and CT angiogram of the abdomen 9/24/2017.    FINDINGS: Evaluation of the solid organ parenchyma is limited by the lack of intravenous contrast.      There are small bilateral pleural effusions with underlying compressive atelectasis/consolidation at the lung bases.    Streak artifact related to patient's arms degrades image quality limiting evaluation.    There are a few small indeterminate hypodense lesions within the liver.  Patient is status post cholecystectomy.    There has been interval resolution of the previously noted splenic  subcapsular hematoma.  Surgical clips are present in the left upper quadrant.  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    There is moderate left-sided hydronephrosis and hydroureter to the level of the urinary bladder, similar in appearance to prior exam. There is probable ureteral wall thickening.  No obstructing ureteral calcification is noted.  There is mild to moderate left-sided perinephric stranding, similar to prior exam.  There is atrophy of the renal cortex at the upper pole. There is a right-sided renal cyst. No right-sided hydronephrosis is noted.    The urinary bladder is distended.    There is arteriosclerotic calcification of the abdominal aorta. Multiple left-sided retroperitoneal surgical clips are noted.    Evaluation of the gastrointestinal tract is limited without distention.  There is retained particulate matter within the stomach which is   moderately distended. There is a moderate fecal load throughout the colon. Extensive colonic diverticulosis is noted.    There are multilevel degenerative changes of the lumbar spine with  scoliosis.  There are prominent degenerative changes at both hip joints.    Impression:    Limited exam.    Moderate left-sided hydroureteronephrosis, which may be chronic.  Correlate for bladder outlet obstruction or reflux nephropathy. No obstructing ureteral calculus noted.  Other findings as discussed above.    < end of copied text >    VITALS:  T(C): 36.7 (08-29-18 @ 07:13), Max: 36.9 (08-28-18 @ 15:19)  T(F): 98 (08-29-18 @ 07:13), Max: 98.5 (08-28-18 @ 15:19)  HR: 67 (08-29-18 @ 07:13) (67 - 78)  BP: 114/56 (08-29-18 @ 07:13) (96/70 - 114/56)  BP(mean): --  ABP: --  ABP(mean): --  RR: 14 (08-29-18 @ 07:13) (14 - 14)  SpO2: 100% (08-29-18 @ 07:13) (98% - 100%)  CVP(mm Hg): --  CVP(cm H2O): --    Ins and Outs     08-28-18 @ 07:01  -  08-29-18 @ 07:00  --------------------------------------------------------  IN: 710 mL / OUT: 0 mL / NET: 710 mL    08-29-18 @ 07:01  -  08-29-18 @ 15:09  --------------------------------------------------------  IN: 480 mL / OUT: 0 mL / NET: 480 mL

## 2018-08-29 NOTE — CONSULT NOTE ADULT - ASSESSMENT
Admitted with UTI/sepsis/bacteremia. Responding to abx. ID following. Ultrasound with mild hydronephrosis.

## 2018-08-29 NOTE — CONSULT NOTE ADULT - SUBJECTIVE AND OBJECTIVE BOX
Patient is a 83y old  Female who presents with a chief complaint of hyperglycemia.     HPI:    Admitted with bacteremia/UTI. On abx and improving Voids without hematuria H/o dementia and confusion - denies any abdominal or flank pain now. Recent ultrasound revealed some mild hydronephrosis    PAST MEDICAL & SURGICAL HISTORY:  Colon cancer: staus post colon resection  Dementia  Hypertension, unspecified type  Type 2 diabetes mellitus with complication, without long-term current use of insulin  History of colon resection: secondary to colon cancer, unable to determine date and extent of surgery at this time      REVIEW OF SYSTEMS:    CONSTITUTIONAL:  no fevers or chills  NECK: No pain or stiffness  MUSCULOSKELETAL: No back pain, no joint pain  RESPIRATORY: No shortness of breath  CARDIOVASCULAR: No chest pain  GASTROINTESTINAL: No abdominal or epigastric pain. No nausea, vomiting,  No diarrhea or constipation.         MEDICATIONS  (STANDING):  amLODIPine   Tablet 10 milliGRAM(s) Oral daily  cefTRIAXone   IVPB 1 Gram(s) IV Intermittent every 24 hours  cyanocobalamin 1000 MICROGram(s) Oral <User Schedule>  dextrose 5%. 1000 milliLiter(s) (50 mL/Hr) IV Continuous <Continuous>  dextrose 50% Injectable 12.5 Gram(s) IV Push once  dextrose 50% Injectable 25 Gram(s) IV Push once  dextrose 50% Injectable 25 Gram(s) IV Push once  heparin  Injectable 5000 Unit(s) SubCutaneous every 12 hours  insulin glargine Injectable (LANTUS) 8 Unit(s) SubCutaneous at bedtime  insulin lispro (HumaLOG) corrective regimen sliding scale   SubCutaneous Before meals and at bedtime  insulin lispro Injectable (HumaLOG) 3 Unit(s) SubCutaneous three times a day before meals  lisinopril 10 milliGRAM(s) Oral daily  pantoprazole    Tablet 40 milliGRAM(s) Oral daily  QUEtiapine 25 milliGRAM(s) Oral every 8 hours  sertraline 25 milliGRAM(s) Oral daily  traZODone 50 milliGRAM(s) Oral at bedtime    MEDICATIONS  (PRN):  acetaminophen  Suppository 650 milliGRAM(s) Rectal every 6 hours PRN For Temp greater than 38 C (100.4 F)  dextrose 40% Gel 15 Gram(s) Oral once PRN Blood Glucose LESS THAN 70 milliGRAM(s)/deciliter  glucagon  Injectable 1 milliGRAM(s) IntraMuscular once PRN Glucose LESS THAN 70 milligrams/deciliter      Allergies    lovastatin (Unknown)  penicillin (Unknown)  simvastatin (Unknown)  statins (Unknown)      SOCIAL HISTORY: No illicit drug use    FAMILY HISTORY:  No pertinent family history in first degree relatives      Vital Signs Last 24 Hrs  T(C): 37.2 (29 Aug 2018 15:57), Max: 37.2 (29 Aug 2018 15:57)  T(F): 98.9 (29 Aug 2018 15:57), Max: 98.9 (29 Aug 2018 15:57)  HR: 77 (29 Aug 2018 15:57) (67 - 77)  BP: 105/57        PHYSICAL EXAM:      Gen: NAD  Back: No CVA tenderness  Respiratory: No accessory respiratory muscle use  Abd: Soft, NT/ND         LABS:                        9.0    7.8   )-----------( 145      ( 29 Aug 2018 08:09 )             28.8     08-29    135  |  103  |  21  ----------------------------<  182<H>  4.5   |  24  |  1.14    Ca    9.2      29 Aug 2018 08:09  Phos  2.7     08-28  Mg     2.0     08-28            Culture - Urine (collected 08-26-18 @ 16:40)  Source: .Urine Catheterized  Final Report (08-28-18 @ 17:22):    >100,000 CFU/ml Escherichia coli  Organism: Escherichia coli (08-28-18 @ 17:22)  Organism: Escherichia coli (08-28-18 @ 17:22)      -  Amikacin: S <=8      -  Amoxicillin/Clavulanic Acid: S <=8/4      -  Ampicillin: S 4 These ampicillin results predict results for amoxicillin      -  Ampicillin/Sulbactam: S <=4/2      -  Aztreonam: S <=4      -  Cefazolin: S <=2 For uncomplicated UTI with K. pneumoniae, E. coli, or P. mirablis: CHARMAINE <=16 is sensitive and CHARMAINE >=32 is resistant. This also predicts results for oral agents cefaclor, cefdinir, cefpodoxime, cefprozil, cefuroxime axetil, cephalexin and locarbef for uncomplicated UTI. Note that some isolates may be susceptible to these agents while testing resistant to cefazolin.      -  Cefepime: S <=2      -  Cefoxitin: S <=4      -  Ceftriaxone: S <=1 Enterobacter, Citrobacter, and Serratia may develop resistance during prolonged therapy      -  Ciprofloxacin: R >2      -  Ertapenem: S <=0.5      -  Gentamicin: S <=1      -  Imipenem: S <=1      -  Levofloxacin: R >4      -  Meropenem: S <=1      -  Nitrofurantoin: S <=32 Should not be used to treat pyelonephritis      -  Piperacillin/Tazobactam: S <=8      -  Tigecycline: S <=1      -  Tobramycin: S <=2      -  Trimethoprim/Sulfamethoxazole: R >2/38      Method Type: CHARMAINE    Culture - Blood (collected 08-26-18 @ 15:54)  Source: .Blood Blood-Peripheral  Gram Stain (08-27-18 @ 08:23):    Growth in aerobic and anaerobic bottles: Gram Negative Rods  Final Report (08-29-18 @ 12:53):    Growth in aerobic and anaerobic bottles: Escherichia coli    See previous culture [id number]    Culture - Blood (collected 08-26-18 @ 15:54)  Source: .Blood Blood-Peripheral  Gram Stain (08-27-18 @ 08:24):    Growth in aerobic and anaerobic bottles: Gram Negative Rods  Final Report (08-29-18 @ 12:50):    Growth in aerobic and anaerobic bottles: Escherichia coli

## 2018-08-29 NOTE — PROGRESS NOTE ADULT - PROBLEM SELECTOR PLAN 2
uncontrolled t2dm hba1c 11.9 change lantus to 10 units qhs awaiting patient weight to add premeal insulin uncontrolled t2dm hba1c 11.9 change lantus to 10 units qhs add 3units premeal monitor fs

## 2018-08-30 DIAGNOSIS — A41.9 SEPSIS, UNSPECIFIED ORGANISM: ICD-10-CM

## 2018-08-30 DIAGNOSIS — R78.81 BACTEREMIA: ICD-10-CM

## 2018-08-30 LAB
GLUCOSE BLDC GLUCOMTR-MCNC: 136 MG/DL — HIGH (ref 70–99)
GLUCOSE BLDC GLUCOMTR-MCNC: 181 MG/DL — HIGH (ref 70–99)
GLUCOSE BLDC GLUCOMTR-MCNC: 195 MG/DL — HIGH (ref 70–99)
GLUCOSE BLDC GLUCOMTR-MCNC: 206 MG/DL — HIGH (ref 70–99)

## 2018-08-30 PROCEDURE — 99233 SBSQ HOSP IP/OBS HIGH 50: CPT

## 2018-08-30 RX ADMIN — Medication 3 UNIT(S): at 07:51

## 2018-08-30 RX ADMIN — INSULIN GLARGINE 8 UNIT(S): 100 INJECTION, SOLUTION SUBCUTANEOUS at 22:25

## 2018-08-30 RX ADMIN — PREGABALIN 1000 MICROGRAM(S): 225 CAPSULE ORAL at 06:30

## 2018-08-30 RX ADMIN — PANTOPRAZOLE SODIUM 40 MILLIGRAM(S): 20 TABLET, DELAYED RELEASE ORAL at 12:22

## 2018-08-30 RX ADMIN — HEPARIN SODIUM 5000 UNIT(S): 5000 INJECTION INTRAVENOUS; SUBCUTANEOUS at 17:16

## 2018-08-30 RX ADMIN — HEPARIN SODIUM 5000 UNIT(S): 5000 INJECTION INTRAVENOUS; SUBCUTANEOUS at 06:30

## 2018-08-30 RX ADMIN — Medication 50 MILLIGRAM(S): at 22:29

## 2018-08-30 RX ADMIN — Medication 2: at 17:16

## 2018-08-30 RX ADMIN — Medication 4: at 12:22

## 2018-08-30 RX ADMIN — PREGABALIN 1000 MICROGRAM(S): 225 CAPSULE ORAL at 17:16

## 2018-08-30 RX ADMIN — QUETIAPINE FUMARATE 25 MILLIGRAM(S): 200 TABLET, FILM COATED ORAL at 13:22

## 2018-08-30 RX ADMIN — QUETIAPINE FUMARATE 25 MILLIGRAM(S): 200 TABLET, FILM COATED ORAL at 22:29

## 2018-08-30 RX ADMIN — QUETIAPINE FUMARATE 25 MILLIGRAM(S): 200 TABLET, FILM COATED ORAL at 06:31

## 2018-08-30 RX ADMIN — SERTRALINE 25 MILLIGRAM(S): 25 TABLET, FILM COATED ORAL at 12:22

## 2018-08-30 RX ADMIN — CEFTRIAXONE 100 GRAM(S): 500 INJECTION, POWDER, FOR SOLUTION INTRAMUSCULAR; INTRAVENOUS at 17:15

## 2018-08-30 RX ADMIN — Medication 2: at 22:29

## 2018-08-30 RX ADMIN — Medication 3 UNIT(S): at 17:16

## 2018-08-30 NOTE — PROGRESS NOTE ADULT - ASSESSMENT
Patient with ecoli bacteremia gu source with hydronephrosis. F/u blood cultures pend but severe sepsis has resolved  Plan  6 more days of ceftriaxone, po options exist when ready to go  follow up pending repeat cultures  await gu evaluation of ct with hydro

## 2018-08-30 NOTE — PROGRESS NOTE ADULT - ASSESSMENT
Physical Examination:  GENERAL:               Alert, Confused, No acute distress.    HEENT:                    Symmetric. No JVD, dry MM  PULM:                     Bilateral air entry, Clear to auscultation bilaterally, no significant sputum production, No Rales, No Rhonchi, No Wheezing  CVS:                         S1, S2,  + Murmur  ABD:                        Soft, nondistended, nontender, normoactive bowel sounds,   EXT:                         No edema, nontender, No Cyanosis or Clubbing   Vascular:                Warm Extremities, Normal Capillary refill, Normal Distal Pulses  SKIN:                       Warm and well perfused, no rashes noted.   NEURO:                  Alert, oriented, interactive, nonfocal, follows commands  PSYC:                      Calm, no Insight.        Assessment  1. S/P Septic shock due to UTI and GNR Bacteremia due to severe lactic acidosis that was not evident in ED but occurred shortly after transfer to ICU with Fever.           Lactic acidosis resolved            however pt on home metformin that can also explain lactic acidosis but clinically appeared to septic when came to ICU  2. CT showing Moderate left-sided hydroureteronephrosis, which may be chronic.  Correlate for bladder outlet obstruction or reflux nephropathy.  3. Hyperglycemia likely worsened by UTI/bacteremia, but pt has had recent ED admission for hyperglycemia, suspect underlying poorly controlled DM2           Hemoglobin A1C, Whole Blood: 11.9 % <H> (08-27-18 @ 03:00)  4. Severe dementia unable to provide history or ROS  5.  H/O HTN off BP meds for soft BP      Plan  IV abx -as per ID  bp meds with hold Parameters   Insulin with glycemic control  Urology follow up    PMD:		Dr. Guzman 		                   Notified(Date):  8/26  Family Updated:  son		                                 Date: 8/27      Sedation & Analgesia:	home psyc meds  Diet/Nutrition:		oral diet  GI PPx:			PPI    DVT Ppx:		Hep sq    Activity:		      oob as tolerated           Head of Bed:               35-45 deg  Glycemic Control:        insulin/lantus    Lines: PIV    Restraints were deemed necessary to prevent removal of life-sustaining devices [  ] YES   [ x   ]  NO    Disposition: continue current care on sangeeta. at this time no active CCM issues reconsult as needed    Goals of Care: Full code

## 2018-08-30 NOTE — PROGRESS NOTE ADULT - SUBJECTIVE AND OBJECTIVE BOX
Follow-up Critical Care Progress Note  Chief Complaint : Hyperglycemia    pt feels well  unable to provide accurate history/ros  has no acute complaints     Allergies :lovastatin (Unknown)  penicillin (Unknown)  simvastatin (Unknown)  statins (Unknown)      PAST MEDICAL & SURGICAL HISTORY:  Colon cancer: staus post colon resection  Dementia  Hypertension, unspecified type  Type 2 diabetes mellitus with complication, without long-term current use of insulin  History of colon resection: secondary to colon cancer, unable to determine date and extent of surgery at this time      Medications:  MEDICATIONS  (STANDING):  amLODIPine   Tablet 10 milliGRAM(s) Oral daily  cefTRIAXone   IVPB 1 Gram(s) IV Intermittent every 24 hours  cyanocobalamin 1000 MICROGram(s) Oral <User Schedule>  dextrose 5%. 1000 milliLiter(s) (50 mL/Hr) IV Continuous <Continuous>  dextrose 50% Injectable 12.5 Gram(s) IV Push once  dextrose 50% Injectable 25 Gram(s) IV Push once  dextrose 50% Injectable 25 Gram(s) IV Push once  heparin  Injectable 5000 Unit(s) SubCutaneous every 12 hours  insulin glargine Injectable (LANTUS) 8 Unit(s) SubCutaneous at bedtime  insulin lispro (HumaLOG) corrective regimen sliding scale   SubCutaneous Before meals and at bedtime  insulin lispro Injectable (HumaLOG) 3 Unit(s) SubCutaneous three times a day before meals  lisinopril 10 milliGRAM(s) Oral daily  pantoprazole    Tablet 40 milliGRAM(s) Oral daily  QUEtiapine 25 milliGRAM(s) Oral every 8 hours  sertraline 25 milliGRAM(s) Oral daily  traZODone 50 milliGRAM(s) Oral at bedtime    MEDICATIONS  (PRN):  acetaminophen  Suppository 650 milliGRAM(s) Rectal every 6 hours PRN For Temp greater than 38 C (100.4 F)  dextrose 40% Gel 15 Gram(s) Oral once PRN Blood Glucose LESS THAN 70 milliGRAM(s)/deciliter  glucagon  Injectable 1 milliGRAM(s) IntraMuscular once PRN Glucose LESS THAN 70 milligrams/deciliter      LABS:                        9.0    7.8   )-----------( 145      ( 29 Aug 2018 08:09 )             28.8     08-29    135  |  103  |  21  ----------------------------<  182<H>  4.5   |  24  |  1.14    Ca    9.2      29 Aug 2018 08:09                          CULTURES: (if applicable)    Culture - Urine (collected 08-26-18 @ 16:40)  Source: .Urine Catheterized  Final Report (08-28-18 @ 17:22):    >100,000 CFU/ml Escherichia coli  Organism: Escherichia coli (08-28-18 @ 17:22)  Organism: Escherichia coli (08-28-18 @ 17:22)      -  Amikacin: S <=8      -  Amoxicillin/Clavulanic Acid: S <=8/4      -  Ampicillin: S 4 These ampicillin results predict results for amoxicillin      -  Ampicillin/Sulbactam: S <=4/2      -  Aztreonam: S <=4      -  Cefazolin: S <=2 For uncomplicated UTI with K. pneumoniae, E. coli, or P. mirablis: CHARMAINE <=16 is sensitive and CHARMAINE >=32 is resistant. This also predicts results for oral agents cefaclor, cefdinir, cefpodoxime, cefprozil, cefuroxime axetil, cephalexin and locarbef for uncomplicated UTI. Note that some isolates may be susceptible to these agents while testing resistant to cefazolin.      -  Cefepime: S <=2      -  Cefoxitin: S <=4      -  Ceftriaxone: S <=1 Enterobacter, Citrobacter, and Serratia may develop resistance during prolonged therapy      -  Ciprofloxacin: R >2      -  Ertapenem: S <=0.5      -  Gentamicin: S <=1      -  Imipenem: S <=1      -  Levofloxacin: R >4      -  Meropenem: S <=1      -  Nitrofurantoin: S <=32 Should not be used to treat pyelonephritis      -  Piperacillin/Tazobactam: S <=8      -  Tigecycline: S <=1      -  Tobramycin: S <=2      -  Trimethoprim/Sulfamethoxazole: R >2/38      Method Type: CHARMAINE    Culture - Blood (collected 08-26-18 @ 15:54)  Source: .Blood Blood-Peripheral  Gram Stain (08-27-18 @ 08:23):    Growth in aerobic and anaerobic bottles: Gram Negative Rods  Final Report (08-29-18 @ 12:53):    Growth in aerobic and anaerobic bottles: Escherichia coli    See previous culture [id number]    Culture - Blood (collected 08-26-18 @ 15:54)  Source: .Blood Blood-Peripheral  Gram Stain (08-27-18 @ 08:24):    Growth in aerobic and anaerobic bottles: Gram Negative Rods  Final Report (08-29-18 @ 12:50):    Growth in aerobic and anaerobic bottles: Escherichia coli    "Due to technical problems, Proteus sp. will Not be reported as part of    the BCID panel until further notice"    ***Blood Panel PCR results on this specimen are available    approximately 3 hours after the Gram stain result.***    Gram stain, PCR, and/or culture results may not always    correspond due to difference in methodologies.    ************************************************************    This PCR assay was performed using [employer].    The following targets are tested for: Enterococcus,    vancomycin resistant enterococci, Listeria monocytogenes,    coagulase negative staphylococci, S. aureus,    methicillin resistant S. aureus, Streptococcus agalactiae    (Group B), S. pneumoniae, S.pyogenes (Group A),    Acinetobacter baumannii, Enterobacter cloacae, E. coli,    Klebsiella oxytoca, K. pneumoniae, Proteus sp.,    Serratia marcescens, Haemophilus influenzae,    Neisseria meningitidis, Pseudomonas aeruginosa, Candida    albicans, C. glabrata, C krusei, C parapsilosis,    C. tropicalis and the KPC resistance gene.  Organism: Blood Culture PCR  Escherichia coli (08-29-18 @ 12:50)  Organism: Escherichia coli (08-29-18 @ 12:50)      -  Amikacin: S <=8      -  Ampicillin: S 4 These ampicillin results predict results for amoxicillin      -  Ampicillin/Sulbactam: S <=4/2      -  Aztreonam: S <=4      -  Cefazolin: S <=2      -  Cefepime: S <=2      -  Cefoxitin: S <=4      -  Ceftriaxone: S <=1 Enterobacter, Citrobacter, and Serratia may develop resistance during prolonged therapy      -  Ciprofloxacin: R >2      -  Ertapenem: S <=0.5      -  Gentamicin: R >8      -  Imipenem: S <=1      -  Levofloxacin: R >4      -  Meropenem: S <=1      -  Piperacillin/Tazobactam: S <=8      -  Tobramycin: R >8      -  Trimethoprim/Sulfamethoxazole: R >2/38      Method Type: CHARMAINE  Organism: Blood Culture PCR (08-29-18 @ 12:50)      -  Escherichia coli: Detec      Method Type: PCR            CAPILLARY BLOOD GLUCOSE      POCT Blood Glucose.: 206 mg/dL (30 Aug 2018 12:04)      RADIOLOGY  CXR:      CT:    ECHO:      VITALS:  T(C): 36.3 (08-30-18 @ 05:20), Max: 37.2 (08-29-18 @ 15:57)  T(F): 97.4 (08-30-18 @ 05:20), Max: 98.9 (08-29-18 @ 15:57)  HR: 72 (08-30-18 @ 05:20) (72 - 78)  BP: 113/61 (08-30-18 @ 05:20) (105/54 - 113/61)  BP(mean): --  ABP: --  ABP(mean): --  RR: 14 (08-30-18 @ 05:20) (14 - 14)  SpO2: 100% (08-30-18 @ 05:20) (99% - 100%)  CVP(mm Hg): --  CVP(cm H2O): --    Ins and Outs     08-29-18 @ 07:01  -  08-30-18 @ 07:00  --------------------------------------------------------  IN: 1100 mL / OUT: 0 mL / NET: 1100 mL    08-30-18 @ 07:01  -  08-30-18 @ 13:38  --------------------------------------------------------  IN: 240 mL / OUT: 0 mL / NET: 240 mL

## 2018-08-30 NOTE — PROGRESS NOTE ADULT - SUBJECTIVE AND OBJECTIVE BOX
CC: f/u for  ecoli bacteremia  Patient reports  nothing   REVIEW OF SYSTEMS:  All other review of systems negative (Comprehensive ROS)not talking today    Antimicrobials Day #  :  cefTRIAXone   IVPB 1 Gram(s) IV Intermittent every 24 hours    Other Medications Reviewed    T(F): 97.4 (08-30-18 @ 05:20), Max: 98.9 (08-29-18 @ 15:57)  HR: 72 (08-30-18 @ 05:20)  BP: 113/61 (08-30-18 @ 05:20)  RR: 14 (08-30-18 @ 05:20)  SpO2: 100% (08-30-18 @ 05:20)  Wt(kg): --    PHYSICAL EXAM:  General: sleepy, no acute distress  Eyes:  anicteric, no conjunctival injection, no discharge  Oropharynx: no lesions or injection 	  Neck: supple, without adenopathy  Lungs: clear to auscultation  Heart: regular rate and rhythm; no murmur, rubs or gallops  Abdomen: soft, nondistended, nontender, without mass or organomegaly  Skin: no lesions  Extremities: no clubbing, cyanosis, or edema  Neurologic: sleepy moves all extremities    LAB RESULTS:                        9.0    7.8   )-----------( 145      ( 29 Aug 2018 08:09 )             28.8     08-29    135  |  103  |  21  ----------------------------<  182<H>  4.5   |  24  |  1.14    Ca    9.2      29 Aug 2018 08:09          MICROBIOLOGY:  RECENT CULTURES:  08-26 @ 16:40 .Urine Catheterized Escherichia coli    >100,000 CFU/ml Escherichia coli      08-26 @ 15:54 .Blood Blood-Peripheral Blood Culture PCR  Escherichia coli    Growth in aerobic and anaerobic bottles: Escherichia coli  "Due to technical problems, Proteus sp. will Not be reported as part of  the BCID panel until further notice"  ***Blood Panel PCR results on this specimen are available  approximately 3 hours after the Gram stain result.***  Gram stain, PCR, and/or culture results may not always  correspond due to difference in methodologies.  ************************************************************  This PCR assay was performed using [employer].  The following targets are tested for: Enterococcus,  vancomycin resistant enterococci, Listeria monocytogenes,  coagulase negative staphylococci, S. aureus,  methicillin resistant S. aureus, Streptococcus agalactiae  (Group B), S. pneumoniae, S.pyogenes (Group A),  Acinetobacter baumannii, Enterobacter cloacae, E. coli,  Klebsiella oxytoca, K. pneumoniae, Proteus sp.,  Serratia marcescens, Haemophilus influenzae,  Neisseria meningitidis, Pseudomonas aeruginosa, Candida  albicans, C. glabrata, C krusei, C parapsilosis,  C. tropicalis and the KPC resistance gene.    Growth in aerobic and anaerobic bottles: Gram Negative Rods        RADIOLOGY REVIEWED:  < from: CT Abdomen and Pelvis No Cont (08.29.18 @ 10:00) >  Impression:    Limited exam.    Moderate left-sided hydroureteronephrosis, which may be chronic.   Correlate for bladder outlet obstruction or reflux nephropathy.  No obstructing ureteral calculus noted.    Other findings as discussed above.    < end of copied text >              Assessment:    Plan:

## 2018-08-30 NOTE — PROGRESS NOTE ADULT - PROBLEM SELECTOR PLAN 1
resolved now on IV CTX afebrile no leukocytosis  f/u repeat blood cultures continue IV CTX afebrile no leukocytosis  f/u repeat blood cultures  ID following

## 2018-08-30 NOTE — PROGRESS NOTE ADULT - PROBLEM SELECTOR PROBLEM 5
Alzheimer's dementia without behavioral disturbance, unspecified timing of dementia onset Hypertension, unspecified type

## 2018-08-30 NOTE — PROGRESS NOTE ADULT - PROBLEM SELECTOR PROBLEM 6
Depression, unspecified depression type Alzheimer's dementia without behavioral disturbance, unspecified timing of dementia onset

## 2018-08-30 NOTE — PROGRESS NOTE ADULT - SUBJECTIVE AND OBJECTIVE BOX
Patient is a 83 year old  Female who presents with a chief complaint of elevated blood sugar    Patient seen and examined at bedside. Patient in chair, NAD, is confused unable to provide history/ ROS        MEDICATIONS  (STANDING):  amLODIPine   Tablet 10 milliGRAM(s) Oral daily  cefTRIAXone   IVPB 1 Gram(s) IV Intermittent every 24 hours  cyanocobalamin 1000 MICROGram(s) Oral <User Schedule>  dextrose 5%. 1000 milliLiter(s) (50 mL/Hr) IV Continuous <Continuous>  dextrose 50% Injectable 12.5 Gram(s) IV Push once  dextrose 50% Injectable 25 Gram(s) IV Push once  dextrose 50% Injectable 25 Gram(s) IV Push once  heparin  Injectable 5000 Unit(s) SubCutaneous every 12 hours  insulin glargine Injectable (LANTUS) 8 Unit(s) SubCutaneous at bedtime  insulin lispro (HumaLOG) corrective regimen sliding scale   SubCutaneous Before meals and at bedtime  insulin lispro Injectable (HumaLOG) 3 Unit(s) SubCutaneous three times a day before meals  lisinopril 10 milliGRAM(s) Oral daily  pantoprazole    Tablet 40 milliGRAM(s) Oral daily  QUEtiapine 25 milliGRAM(s) Oral every 8 hours  sertraline 25 milliGRAM(s) Oral daily  traZODone 50 milliGRAM(s) Oral at bedtime    MEDICATIONS  (PRN):  acetaminophen  Suppository 650 milliGRAM(s) Rectal every 6 hours PRN For Temp greater than 38 C (100.4 F)  dextrose 40% Gel 15 Gram(s) Oral once PRN Blood Glucose LESS THAN 70 milliGRAM(s)/deciliter  glucagon  Injectable 1 milliGRAM(s) IntraMuscular once PRN Glucose LESS THAN 70 milligrams/deciliter      REVIEW OF SYSTEMS:  Patient unable to provide as she is confused      PHYSICAL EXAM:    T(C): 36.3 (08-30-18 @ 05:20), Max: 37.2 (08-29-18 @ 15:57)  HR: 72 (08-30-18 @ 05:20) (72 - 78)  BP: 113/61 (08-30-18 @ 05:20) (105/54 - 113/61)  RR: 14 (08-30-18 @ 05:20) (14 - 14)  SpO2: 100% (08-30-18 @ 05:20) (99% - 100%)  I&O's Summary    29 Aug 2018 07:01  -  30 Aug 2018 07:00  --------------------------------------------------------  IN: 1100 mL / OUT: 0 mL / NET: 1100 mL    30 Aug 2018 07:01  -  30 Aug 2018 11:02  --------------------------------------------------------  IN: 240 mL / OUT: 0 mL / NET: 240 mL        GENERAL: Elderly female, NAD  HEAD:  Atraumatic, Normocephalic  EYES: EOMI, PERRL, conjunctiva and sclera clear  ENMT: Moist mucous membranes  NECK: Supple, No JVD, Normal thyroid  NERVOUS SYSTEM:  Alert & Oriented X 1  CHEST/LUNG: Clear to ascultation bilaterally; No rales, rhonchi, wheezing, or rubs  HEART: Regular rate and rhythm; S1/S2, No murmurs, rubs, or gallops  ABDOMEN: Soft, Nontender, Nondistended; Bowel sounds present  EXTREMITIES:  2+ Peripheral Pulses, No clubbing, cyanosis, or edema  SKIN: No rashes or lesions        LABS:                        9.0    7.8   )-----------( 145      ( 29 Aug 2018 08:09 )             28.8     08-29    135  |  103  |  21  ----------------------------<  182<H>  4.5   |  24  |  1.14    Ca    9.2      29 Aug 2018 08:09          CAPILLARY BLOOD GLUCOSE  POCT Blood Glucose.: 136 mg/dL (30 Aug 2018 07:21)  POCT Blood Glucose.: 304 mg/dL (29 Aug 2018 22:00)  POCT Blood Glucose.: 220 mg/dL (29 Aug 2018 16:52)  POCT Blood Glucose.: 183 mg/dL (29 Aug 2018 11:34)      RADIOLOGY & ADDITIONAL TESTS:    Imaging Personally Reviewed:  [x] YES  [ ] NO    Consultant(s) Notes Reviewed:  [x] YES  [ ] NO    Care Discussed with Consultants/Other Providers [x] YES  [ ] NO

## 2018-08-31 ENCOUNTER — TRANSCRIPTION ENCOUNTER (OUTPATIENT)
Age: 83
End: 2018-08-31

## 2018-08-31 VITALS
TEMPERATURE: 98 F | RESPIRATION RATE: 14 BRPM | OXYGEN SATURATION: 99 % | SYSTOLIC BLOOD PRESSURE: 120 MMHG | HEART RATE: 80 BPM | DIASTOLIC BLOOD PRESSURE: 60 MMHG

## 2018-08-31 LAB
ALBUMIN SERPL ELPH-MCNC: 2.3 G/DL — LOW (ref 3.3–5)
ALP SERPL-CCNC: 74 U/L — SIGNIFICANT CHANGE UP (ref 40–120)
ALT FLD-CCNC: 19 U/L DA — SIGNIFICANT CHANGE UP (ref 10–45)
ANION GAP SERPL CALC-SCNC: 6 MMOL/L — SIGNIFICANT CHANGE UP (ref 5–17)
AST SERPL-CCNC: 19 U/L — SIGNIFICANT CHANGE UP (ref 10–40)
BASOPHILS # BLD AUTO: 0.2 K/UL — SIGNIFICANT CHANGE UP (ref 0–0.2)
BASOPHILS NFR BLD AUTO: 1 % — SIGNIFICANT CHANGE UP (ref 0–2)
BILIRUB SERPL-MCNC: 0.4 MG/DL — SIGNIFICANT CHANGE UP (ref 0.2–1.2)
BUN SERPL-MCNC: 24 MG/DL — HIGH (ref 7–23)
CALCIUM SERPL-MCNC: 9.1 MG/DL — SIGNIFICANT CHANGE UP (ref 8.4–10.5)
CHLORIDE SERPL-SCNC: 103 MMOL/L — SIGNIFICANT CHANGE UP (ref 96–108)
CO2 SERPL-SCNC: 26 MMOL/L — SIGNIFICANT CHANGE UP (ref 22–31)
CREAT SERPL-MCNC: 1.29 MG/DL — SIGNIFICANT CHANGE UP (ref 0.5–1.3)
EOSINOPHIL # BLD AUTO: 0.1 K/UL — SIGNIFICANT CHANGE UP (ref 0–0.5)
EOSINOPHIL NFR BLD AUTO: 1 % — SIGNIFICANT CHANGE UP (ref 0–6)
GLUCOSE BLDC GLUCOMTR-MCNC: 139 MG/DL — HIGH (ref 70–99)
GLUCOSE BLDC GLUCOMTR-MCNC: 309 MG/DL — HIGH (ref 70–99)
GLUCOSE SERPL-MCNC: 172 MG/DL — HIGH (ref 70–99)
HCT VFR BLD CALC: 26.4 % — LOW (ref 34.5–45)
HGB BLD-MCNC: 8.4 G/DL — LOW (ref 11.5–15.5)
LYMPHOCYTES # BLD AUTO: 1 K/UL — SIGNIFICANT CHANGE UP (ref 1–3.3)
LYMPHOCYTES # BLD AUTO: 22 % — SIGNIFICANT CHANGE UP (ref 13–44)
MAGNESIUM SERPL-MCNC: 1.7 MG/DL — SIGNIFICANT CHANGE UP (ref 1.6–2.6)
MCHC RBC-ENTMCNC: 29.9 PG — SIGNIFICANT CHANGE UP (ref 27–34)
MCHC RBC-ENTMCNC: 31.7 GM/DL — LOW (ref 32–36)
MCV RBC AUTO: 94.2 FL — SIGNIFICANT CHANGE UP (ref 80–100)
METAMYELOCYTES # FLD: 1 % — HIGH (ref 0–0)
MONOCYTES # BLD AUTO: 0.7 K/UL — SIGNIFICANT CHANGE UP (ref 0–0.9)
MONOCYTES NFR BLD AUTO: 10 % — SIGNIFICANT CHANGE UP (ref 2–14)
NEUTROPHILS # BLD AUTO: 3.7 K/UL — SIGNIFICANT CHANGE UP (ref 1.8–7.4)
NEUTROPHILS NFR BLD AUTO: 63 % — SIGNIFICANT CHANGE UP (ref 43–77)
NEUTROPHILS NFR BLD AUTO: 63 % — SIGNIFICANT CHANGE UP (ref 43–77)
NEUTS BAND # BLD: 2 % — SIGNIFICANT CHANGE UP (ref 0–8)
PHOSPHATE SERPL-MCNC: 4.3 MG/DL — SIGNIFICANT CHANGE UP (ref 2.5–4.5)
PLAT MORPH BLD: NORMAL — SIGNIFICANT CHANGE UP
PLATELET # BLD AUTO: 157 K/UL — SIGNIFICANT CHANGE UP (ref 150–400)
POTASSIUM SERPL-MCNC: 4.2 MMOL/L — SIGNIFICANT CHANGE UP (ref 3.5–5.3)
POTASSIUM SERPL-SCNC: 4.2 MMOL/L — SIGNIFICANT CHANGE UP (ref 3.5–5.3)
PROT SERPL-MCNC: 6.4 G/DL — SIGNIFICANT CHANGE UP (ref 6–8.3)
RBC # BLD: 2.8 M/UL — LOW (ref 3.8–5.2)
RBC # FLD: 12.4 % — SIGNIFICANT CHANGE UP (ref 10.3–14.5)
RBC BLD AUTO: ABNORMAL
SODIUM SERPL-SCNC: 135 MMOL/L — SIGNIFICANT CHANGE UP (ref 135–145)
WBC # BLD: 5.7 K/UL — SIGNIFICANT CHANGE UP (ref 3.8–10.5)
WBC # FLD AUTO: 5.7 K/UL — SIGNIFICANT CHANGE UP (ref 3.8–10.5)

## 2018-08-31 PROCEDURE — 99239 HOSP IP/OBS DSCHRG MGMT >30: CPT

## 2018-08-31 RX ORDER — QUETIAPINE FUMARATE 200 MG/1
0 TABLET, FILM COATED ORAL
Qty: 0 | Refills: 0 | COMMUNITY

## 2018-08-31 RX ORDER — CEFUROXIME AXETIL 250 MG
1 TABLET ORAL
Qty: 14 | Refills: 0 | OUTPATIENT
Start: 2018-08-31 | End: 2018-09-06

## 2018-08-31 RX ORDER — PREGABALIN 225 MG/1
0 CAPSULE ORAL
Qty: 0 | Refills: 0 | COMMUNITY

## 2018-08-31 RX ORDER — INSULIN LISPRO 100/ML
3 VIAL (ML) SUBCUTANEOUS
Qty: 30 | Refills: 0 | OUTPATIENT
Start: 2018-08-31 | End: 2018-09-29

## 2018-08-31 RX ORDER — INSULIN GLARGINE 100 [IU]/ML
8 INJECTION, SOLUTION SUBCUTANEOUS
Qty: 0 | Refills: 0 | COMMUNITY
Start: 2018-08-31

## 2018-08-31 RX ORDER — PREGABALIN 225 MG/1
1 CAPSULE ORAL
Qty: 0 | Refills: 0 | COMMUNITY
Start: 2018-08-31

## 2018-08-31 RX ADMIN — QUETIAPINE FUMARATE 25 MILLIGRAM(S): 200 TABLET, FILM COATED ORAL at 06:57

## 2018-08-31 RX ADMIN — Medication 3 UNIT(S): at 08:17

## 2018-08-31 RX ADMIN — Medication 3 UNIT(S): at 12:46

## 2018-08-31 RX ADMIN — HEPARIN SODIUM 5000 UNIT(S): 5000 INJECTION INTRAVENOUS; SUBCUTANEOUS at 06:57

## 2018-08-31 RX ADMIN — PREGABALIN 1000 MICROGRAM(S): 225 CAPSULE ORAL at 06:57

## 2018-08-31 RX ADMIN — QUETIAPINE FUMARATE 25 MILLIGRAM(S): 200 TABLET, FILM COATED ORAL at 13:11

## 2018-08-31 RX ADMIN — Medication 8: at 12:46

## 2018-08-31 RX ADMIN — SERTRALINE 25 MILLIGRAM(S): 25 TABLET, FILM COATED ORAL at 12:47

## 2018-08-31 RX ADMIN — CEFTRIAXONE 100 GRAM(S): 500 INJECTION, POWDER, FOR SOLUTION INTRAMUSCULAR; INTRAVENOUS at 12:26

## 2018-08-31 RX ADMIN — PANTOPRAZOLE SODIUM 40 MILLIGRAM(S): 20 TABLET, DELAYED RELEASE ORAL at 12:47

## 2018-08-31 NOTE — DISCHARGE NOTE ADULT - MEDICATION SUMMARY - MEDICATIONS TO TAKE
I will START or STAY ON the medications listed below when I get home from the hospital:    ramipril 5 mg oral capsule  -- 1 cap(s) by mouth once a day  -- Indication: For Hypertension, unspecified type    sertraline 25 mg oral tablet  -- 1 tab(s) by mouth once a day  -- Indication: For Depression, unspecified depression type    traZODone 50 mg oral tablet  -- 25 milligram(s) by mouth once a day (at bedtime)  -- Indication: For Depression, unspecified depression type    insulin glargine  -- 8 unit(s) subcutaneously once a day  -- Indication: For D MII    insulin lispro (concentrated) 200 units/mL subcutaneous solution  -- 3 unit(s) subcutaneous 3 times a day (after meals)   -- Indication: For D MII    QUEtiapine 25 mg oral tablet  -- 1 tab(s) by mouth 3 times a day  -- Indication: For Depression, unspecified depression type    amLODIPine 10 mg oral tablet  -- tab(s) by mouth once a day  -- Indication: For HTN    cefuroxime 250 mg oral tablet  -- 1 tab(s) by mouth every 12 hours   -- Finish all this medication unless otherwise directed by prescriber.  Medication should be taken with plenty of water.  Take with food or milk.    -- Indication: For Bacteremia    omeprazole 40 mg oral delayed release capsule  -- 1 cap(s) by mouth once a day  -- Indication: For GERD    cyanocobalamin 1000 mcg oral tablet  -- 1 tab(s) by mouth   -- Indication: For Supplement

## 2018-08-31 NOTE — PROGRESS NOTE ADULT - PROBLEM SELECTOR PLAN 8
dvt ppx: heparin subq
Now normotensive, 120/70's, would hold off on restarting antihypertensives for now
dvt ppx: heparin subq

## 2018-08-31 NOTE — DISCHARGE NOTE ADULT - PATIENT PORTAL LINK FT
[facility] can [facility] offered by [facility], by registering with the following website: [email or web address]

## 2018-08-31 NOTE — PROGRESS NOTE ADULT - SUBJECTIVE AND OBJECTIVE BOX
The patient is a Patient is a 83y old  Female who presents with UTI. On abx.    Chronic left hydronephrosis. No stones. No pain or colic.      ROS  General: no fever/chills      VITAL SIGNS  Vital Signs Last 24 Hrs  T(C): 36.3 (31 Aug 2018 05:08), Max: 37.1 (30 Aug 2018 15:37)  T(F): 97.3 (31 Aug 2018 05:08), Max: 98.7 (30 Aug 2018 15:37)  HR: 77 (31 Aug 2018 05:08) (76 - 79)  BP: 111/69       LABS:                        8.4    5.7   )-----------( 157      ( 31 Aug 2018 05:50 )             26.4     08-31    135  |  103  |  24<H>  ----------------------------<  172<H>  4.2   |  26  |  1.29      Urine Culture: --  Escherichia coli  --  Blood Culture PCR  Escherichia coli      Prior notes/chart reviewed.

## 2018-08-31 NOTE — PROGRESS NOTE ADULT - PROBLEM SELECTOR PLAN 1
continue IV CTX for now per ID afebrile no leukocytosis  f/u repeat blood cultures, f/u urology evaluation of CT with hydro  ID following

## 2018-08-31 NOTE — DISCHARGE NOTE ADULT - PLAN OF CARE
complete oral antibiotics due to Escherichia coli, given IV antibiotics continue IV CTX switched to oral ceftin per ID afebrile no leukocytosis uncontrolled T2dm hba1c 11.9, insulin adjusted fsbg improving continue to monitor fs. resolution resolved home meds supportive care stable at baseline.

## 2018-08-31 NOTE — DISCHARGE NOTE ADULT - SECONDARY DIAGNOSIS.
Hyperosmolar non-ketotic state in patient with type 2 diabetes mellitus Septic shock Lactic acidosis Depression, unspecified depression type Alzheimer's dementia without behavioral disturbance, unspecified timing of dementia onset

## 2018-08-31 NOTE — DISCHARGE NOTE ADULT - CARE PLAN
Principal Discharge DX:	Bacteremia  Goal:	complete oral antibiotics  Assessment and plan of treatment:	due to Escherichia coli, given IV antibiotics continue IV CTX switched to oral ceftin per ID afebrile no leukocytosis  Secondary Diagnosis:	Hyperosmolar non-ketotic state in patient with type 2 diabetes mellitus  Assessment and plan of treatment:	uncontrolled T2dm hba1c 11.9, insulin adjusted fsbg improving continue to monitor fs.  Secondary Diagnosis:	Septic shock  Goal:	resolution  Assessment and plan of treatment:	resolved  Secondary Diagnosis:	Lactic acidosis  Assessment and plan of treatment:	resolved  Secondary Diagnosis:	Depression, unspecified depression type  Assessment and plan of treatment:	home meds  Secondary Diagnosis:	Alzheimer's dementia without behavioral disturbance, unspecified timing of dementia onset  Goal:	supportive care  Assessment and plan of treatment:	stable at baseline.

## 2018-08-31 NOTE — DISCHARGE NOTE ADULT - MEDICATION SUMMARY - MEDICATIONS TO CHANGE
I will SWITCH the dose or number of times a day I take the medications listed below when I get home from the hospital:    Vitamin B-12 1000 mcg oral tablet  -- orally 2 times a day

## 2018-08-31 NOTE — DISCHARGE NOTE ADULT - MEDICATION SUMMARY - MEDICATIONS TO STOP TAKING
I will STOP taking the medications listed below when I get home from the hospital:    SEROquel 25 mg oral tablet  -- orally 2 times a day

## 2018-08-31 NOTE — PROGRESS NOTE ADULT - PROVIDER SPECIALTY LIST ADULT
Critical Care
Hospitalist
Hospitalist
Infectious Disease
Infectious Disease
Urology
Critical Care
Infectious Disease
Hospitalist

## 2018-08-31 NOTE — PROGRESS NOTE ADULT - ASSESSMENT
84 yo f admitted 8/26/18 pw septic shock due to esbl bactermia +lactic acidosis. Patient also was in HONK uncontrolled T2dm hba1c 11.9. patient has advanced dementia without behavioral disturbance renal us showed hydronephrosis urology consult placed will get ct abd /pel. PT consult placed. Awaiting sensitives to change abx currently on ertapenem and responding appropriately

## 2018-08-31 NOTE — PROGRESS NOTE ADULT - SUBJECTIVE AND OBJECTIVE BOX
Patient is a 83 year old  Female who presents with a chief complaint of elevated blood sugar    Patient seen and examined at bedside. Patient in chair NAD, is confused unable to provide history/ ROS       MEDICATIONS  (STANDING):  amLODIPine   Tablet 10 milliGRAM(s) Oral daily  cefTRIAXone   IVPB 1 Gram(s) IV Intermittent every 24 hours  cyanocobalamin 1000 MICROGram(s) Oral <User Schedule>  dextrose 5%. 1000 milliLiter(s) (50 mL/Hr) IV Continuous <Continuous>  dextrose 50% Injectable 12.5 Gram(s) IV Push once  dextrose 50% Injectable 25 Gram(s) IV Push once  dextrose 50% Injectable 25 Gram(s) IV Push once  heparin  Injectable 5000 Unit(s) SubCutaneous every 12 hours  insulin glargine Injectable (LANTUS) 8 Unit(s) SubCutaneous at bedtime  insulin lispro (HumaLOG) corrective regimen sliding scale   SubCutaneous Before meals and at bedtime  insulin lispro Injectable (HumaLOG) 3 Unit(s) SubCutaneous three times a day before meals  lisinopril 10 milliGRAM(s) Oral daily  pantoprazole    Tablet 40 milliGRAM(s) Oral daily  QUEtiapine 25 milliGRAM(s) Oral every 8 hours  sertraline 25 milliGRAM(s) Oral daily  traZODone 50 milliGRAM(s) Oral at bedtime    MEDICATIONS  (PRN):  acetaminophen  Suppository 650 milliGRAM(s) Rectal every 6 hours PRN For Temp greater than 38 C (100.4 F)  dextrose 40% Gel 15 Gram(s) Oral once PRN Blood Glucose LESS THAN 70 milliGRAM(s)/deciliter  glucagon  Injectable 1 milliGRAM(s) IntraMuscular once PRN Glucose LESS THAN 70 milligrams/deciliter      REVIEW OF SYSTEMS:  Patient unable to provide as she is confused      PHYSICAL EXAM:  T(C): 36.3 (08-31-18 @ 05:08), Max: 37.1 (08-30-18 @ 15:37)  HR: 77 (08-31-18 @ 05:08) (76 - 79)  BP: 111/69 (08-31-18 @ 05:08) (109/55 - 112/50)  RR: 14 (08-31-18 @ 05:08) (14 - 14)  SpO2: 99% (08-31-18 @ 05:08) (99% - 99%)      I&O's Summary    30 Aug 2018 07:01  -  31 Aug 2018 07:00  --------------------------------------------------------  IN: 940 mL / OUT: 0 mL / NET: 940 mL      GENERAL: Elderly female, NAD  HEAD:  Atraumatic, Normocephalic  EYES: EOMI, PERRL, conjunctiva and sclera clear  ENMT: Moist mucous membranes  NECK: Supple, No JVD, Normal thyroid  NERVOUS SYSTEM:  Alert & Oriented X 1  CHEST/LUNG: Clear to ascultation bilaterally; No rales, rhonchi, wheezing, or rubs  HEART: Regular rate and rhythm; S1/S2, No murmurs, rubs, or gallops  ABDOMEN: Soft, Nontender, Nondistended; Bowel sounds present  EXTREMITIES:  2+ Peripheral Pulses, No clubbing, cyanosis, or edema  SKIN: No rashes or lesions      LABS:                        8.4    5.7   )-----------( 157      ( 31 Aug 2018 05:50 )             26.4     08-31    135  |  103  |  24<H>  ----------------------------<  172<H>  4.2   |  26  |  1.29    Ca    9.1      31 Aug 2018 05:50  Mg     1.7     08-31    TPro  6.4  /  Alb  2.3<L>  /  TBili  0.4  /  DBili  x   /  AST  19  /  ALT  19  /  AlkPhos  74  08-31        CAPILLARY BLOOD GLUCOSE  POCT Blood Glucose.: 195 mg/dL (30 Aug 2018 20:52)  POCT Blood Glucose.: 181 mg/dL (30 Aug 2018 17:04)  POCT Blood Glucose.: 206 mg/dL (30 Aug 2018 12:04)  POCT Blood Glucose.: 136 mg/dL (30 Aug 2018 07:21)            RADIOLOGY & ADDITIONAL TESTS:    Imaging Personally Reviewed:  [x] YES  [ ] NO    Consultant(s) Notes Reviewed:  [x] YES  [ ] NO    Care Discussed with Consultants/Other Providers [x] YES  [ ] NO

## 2018-08-31 NOTE — DISCHARGE NOTE ADULT - HOSPITAL COURSE
83 year old female admitted here on 8/26/18 with septic shock due to esbl ecoli bactermia +lactic acidosis. Patient also was in HONK uncontrolled T2dm hba1c 11.9. patient has advanced dementia without behavioral disturbance renal us showed hydronephrosis urology consulted ct abd /pelvis showed ct with hydro, chronic.   Bacteremia due to Escherichia coli, given IV antibiotics continue IV CTX switched to oral ceftin per ID afebrile no leukocytosis  repeat blood cultures were negative, urology recommended outpatient follow up.    Septic shock.  resolved.     Hyperosmolar non-ketotic state in patient with type 2 diabetes mellitus. insulin adjusted fsbg improving continue to monitor fs.     Lactic acidosis.  resolved.     Hypertension, unspecified type. stable off bp meds BP stable monitor vitals.     Alzheimer's dementia without behavioral disturbance, unspecified timing of dementia onset. stable at baseline.    Depression, unspecified depression type  continue  home meds. Patient is medically stable for discharge.

## 2018-09-04 LAB
CULTURE RESULTS: SIGNIFICANT CHANGE UP
CULTURE RESULTS: SIGNIFICANT CHANGE UP
SPECIMEN SOURCE: SIGNIFICANT CHANGE UP
SPECIMEN SOURCE: SIGNIFICANT CHANGE UP

## 2018-09-15 ENCOUNTER — EMERGENCY (EMERGENCY)
Facility: HOSPITAL | Age: 83
LOS: 1 days | Discharge: HOME | End: 2018-09-15
Attending: EMERGENCY MEDICINE | Admitting: EMERGENCY MEDICINE
Payer: COMMERCIAL

## 2018-09-15 VITALS — DIASTOLIC BLOOD PRESSURE: 68 MMHG | SYSTOLIC BLOOD PRESSURE: 114 MMHG | OXYGEN SATURATION: 99 % | HEART RATE: 62 BPM

## 2018-09-15 VITALS
TEMPERATURE: 98 F | OXYGEN SATURATION: 99 % | SYSTOLIC BLOOD PRESSURE: 105 MMHG | HEART RATE: 69 BPM | WEIGHT: 158.95 LBS | RESPIRATION RATE: 18 BRPM | DIASTOLIC BLOOD PRESSURE: 72 MMHG

## 2018-09-15 DIAGNOSIS — Z90.49 ACQUIRED ABSENCE OF OTHER SPECIFIED PARTS OF DIGESTIVE TRACT: Chronic | ICD-10-CM

## 2018-09-15 LAB
ALBUMIN SERPL ELPH-MCNC: 3.2 G/DL — LOW (ref 3.3–5)
ALP SERPL-CCNC: 100 U/L — SIGNIFICANT CHANGE UP (ref 40–120)
ALT FLD-CCNC: 20 U/L DA — SIGNIFICANT CHANGE UP (ref 10–45)
ANION GAP SERPL CALC-SCNC: 7 MMOL/L — SIGNIFICANT CHANGE UP (ref 5–17)
APPEARANCE UR: CLEAR — SIGNIFICANT CHANGE UP
APTT BLD: 31.8 SEC — SIGNIFICANT CHANGE UP (ref 27.5–37.4)
AST SERPL-CCNC: 19 U/L — SIGNIFICANT CHANGE UP (ref 10–40)
BASOPHILS # BLD AUTO: 0.1 K/UL — SIGNIFICANT CHANGE UP (ref 0–0.2)
BASOPHILS NFR BLD AUTO: 1.4 % — SIGNIFICANT CHANGE UP (ref 0–2)
BILIRUB SERPL-MCNC: 0.2 MG/DL — SIGNIFICANT CHANGE UP (ref 0.2–1.2)
BILIRUB UR-MCNC: NEGATIVE — SIGNIFICANT CHANGE UP
BLOOD GAS COMMENTS, VENOUS: SIGNIFICANT CHANGE UP
BUN SERPL-MCNC: 26 MG/DL — HIGH (ref 7–23)
CALCIUM SERPL-MCNC: 9.5 MG/DL — SIGNIFICANT CHANGE UP (ref 8.4–10.5)
CHLORIDE SERPL-SCNC: 98 MMOL/L — SIGNIFICANT CHANGE UP (ref 96–108)
CK MB CFR SERPL CALC: 1.1 NG/ML — SIGNIFICANT CHANGE UP (ref 0.5–10)
CO2 BLDV-SCNC: 20 MMOL/L — LOW (ref 21–29)
CO2 SERPL-SCNC: 27 MMOL/L — SIGNIFICANT CHANGE UP (ref 22–31)
COLOR SPEC: YELLOW — SIGNIFICANT CHANGE UP
CREAT SERPL-MCNC: 1.34 MG/DL — HIGH (ref 0.5–1.3)
DIFF PNL FLD: NEGATIVE — SIGNIFICANT CHANGE UP
EOSINOPHIL # BLD AUTO: 0.1 K/UL — SIGNIFICANT CHANGE UP (ref 0–0.5)
EOSINOPHIL NFR BLD AUTO: 2.1 % — SIGNIFICANT CHANGE UP (ref 0–6)
GLUCOSE SERPL-MCNC: 548 MG/DL — CRITICAL HIGH (ref 70–99)
GLUCOSE UR QL: 1000 MG/DL
HCT VFR BLD CALC: 30.6 % — LOW (ref 34.5–45)
HGB BLD-MCNC: 9.1 G/DL — LOW (ref 11.5–15.5)
INR BLD: 0.96 RATIO — SIGNIFICANT CHANGE UP (ref 0.88–1.16)
KETONES UR-MCNC: NEGATIVE — SIGNIFICANT CHANGE UP
LACTATE SERPL-SCNC: 1.9 MMOL/L — SIGNIFICANT CHANGE UP (ref 0.7–2)
LEUKOCYTE ESTERASE UR-ACNC: NEGATIVE — SIGNIFICANT CHANGE UP
LYMPHOCYTES # BLD AUTO: 0.9 K/UL — LOW (ref 1–3.3)
LYMPHOCYTES # BLD AUTO: 21.1 % — SIGNIFICANT CHANGE UP (ref 13–44)
MCHC RBC-ENTMCNC: 29.6 PG — SIGNIFICANT CHANGE UP (ref 27–34)
MCHC RBC-ENTMCNC: 29.7 GM/DL — LOW (ref 32–36)
MCV RBC AUTO: 99.7 FL — SIGNIFICANT CHANGE UP (ref 80–100)
MONOCYTES # BLD AUTO: 0.3 K/UL — SIGNIFICANT CHANGE UP (ref 0–0.9)
MONOCYTES NFR BLD AUTO: 7.9 % — SIGNIFICANT CHANGE UP (ref 1–9)
NEUTROPHILS # BLD AUTO: 2.8 K/UL — SIGNIFICANT CHANGE UP (ref 1.8–7.4)
NEUTROPHILS NFR BLD AUTO: 67.6 % — SIGNIFICANT CHANGE UP (ref 43–77)
NITRITE UR-MCNC: NEGATIVE — SIGNIFICANT CHANGE UP
PCO2 BLDV: 42 MMHG — SIGNIFICANT CHANGE UP (ref 35–50)
PH BLDV: 7.27 — LOW (ref 7.35–7.45)
PH UR: 7 — SIGNIFICANT CHANGE UP (ref 5–8)
PLATELET # BLD AUTO: 291 K/UL — SIGNIFICANT CHANGE UP (ref 150–400)
PO2 BLDV: 51 MMHG — HIGH (ref 25–45)
POTASSIUM SERPL-MCNC: 5.2 MMOL/L — SIGNIFICANT CHANGE UP (ref 3.5–5.3)
POTASSIUM SERPL-SCNC: 5.2 MMOL/L — SIGNIFICANT CHANGE UP (ref 3.5–5.3)
PROT SERPL-MCNC: 7.9 G/DL — SIGNIFICANT CHANGE UP (ref 6–8.3)
PROT UR-MCNC: NEGATIVE — SIGNIFICANT CHANGE UP
PROTHROM AB SERPL-ACNC: 10.7 SEC — SIGNIFICANT CHANGE UP (ref 9.8–12.7)
RBC # BLD: 3.07 M/UL — LOW (ref 3.8–5.2)
RBC # FLD: 14.9 % — HIGH (ref 10.3–14.5)
SAO2 % BLDV: 78 % — SIGNIFICANT CHANGE UP (ref 67–88)
SODIUM SERPL-SCNC: 132 MMOL/L — LOW (ref 135–145)
SP GR SPEC: 1.01 — SIGNIFICANT CHANGE UP (ref 1.01–1.02)
TROPONIN I SERPL-MCNC: <.017 NG/ML — LOW (ref 0.02–0.06)
UROBILINOGEN FLD QL: NEGATIVE — SIGNIFICANT CHANGE UP
WBC # BLD: 4.1 K/UL — SIGNIFICANT CHANGE UP (ref 3.8–10.5)
WBC # FLD AUTO: 4.1 K/UL — SIGNIFICANT CHANGE UP (ref 3.8–10.5)

## 2018-09-15 PROCEDURE — 83605 ASSAY OF LACTIC ACID: CPT

## 2018-09-15 PROCEDURE — 99284 EMERGENCY DEPT VISIT MOD MDM: CPT | Mod: 25

## 2018-09-15 PROCEDURE — 85730 THROMBOPLASTIN TIME PARTIAL: CPT

## 2018-09-15 PROCEDURE — 80053 COMPREHEN METABOLIC PANEL: CPT

## 2018-09-15 PROCEDURE — 96374 THER/PROPH/DIAG INJ IV PUSH: CPT

## 2018-09-15 PROCEDURE — 99285 EMERGENCY DEPT VISIT HI MDM: CPT

## 2018-09-15 PROCEDURE — 71045 X-RAY EXAM CHEST 1 VIEW: CPT | Mod: 26

## 2018-09-15 PROCEDURE — 85027 COMPLETE CBC AUTOMATED: CPT

## 2018-09-15 PROCEDURE — 93005 ELECTROCARDIOGRAM TRACING: CPT

## 2018-09-15 PROCEDURE — 82553 CREATINE MB FRACTION: CPT

## 2018-09-15 PROCEDURE — 84484 ASSAY OF TROPONIN QUANT: CPT

## 2018-09-15 PROCEDURE — 85610 PROTHROMBIN TIME: CPT

## 2018-09-15 PROCEDURE — 87086 URINE CULTURE/COLONY COUNT: CPT

## 2018-09-15 PROCEDURE — 87040 BLOOD CULTURE FOR BACTERIA: CPT

## 2018-09-15 PROCEDURE — 82803 BLOOD GASES ANY COMBINATION: CPT

## 2018-09-15 PROCEDURE — 82010 KETONE BODYS QUAN: CPT

## 2018-09-15 PROCEDURE — 93010 ELECTROCARDIOGRAM REPORT: CPT

## 2018-09-15 PROCEDURE — 71045 X-RAY EXAM CHEST 1 VIEW: CPT

## 2018-09-15 PROCEDURE — 82962 GLUCOSE BLOOD TEST: CPT

## 2018-09-15 RX ORDER — INSULIN LISPRO 100 [IU]/ML
8 INJECTION, SUSPENSION SUBCUTANEOUS
Qty: 1 | Refills: 0 | OUTPATIENT
Start: 2018-09-15 | End: 2018-10-14

## 2018-09-15 RX ORDER — INSULIN GLARGINE 100 [IU]/ML
16 INJECTION, SOLUTION SUBCUTANEOUS
Qty: 1 | Refills: 0 | OUTPATIENT
Start: 2018-09-15 | End: 2018-10-14

## 2018-09-15 RX ORDER — INSULIN HUMAN 100 [IU]/ML
4 INJECTION, SOLUTION SUBCUTANEOUS ONCE
Qty: 0 | Refills: 0 | Status: COMPLETED | OUTPATIENT
Start: 2018-09-15 | End: 2018-09-15

## 2018-09-15 RX ORDER — SODIUM CHLORIDE 9 MG/ML
2000 INJECTION INTRAMUSCULAR; INTRAVENOUS; SUBCUTANEOUS ONCE
Qty: 0 | Refills: 0 | Status: COMPLETED | OUTPATIENT
Start: 2018-09-15 | End: 2018-09-15

## 2018-09-15 RX ADMIN — SODIUM CHLORIDE 1000 MILLILITER(S): 9 INJECTION INTRAMUSCULAR; INTRAVENOUS; SUBCUTANEOUS at 13:45

## 2018-09-15 RX ADMIN — INSULIN HUMAN 4 UNIT(S): 100 INJECTION, SOLUTION SUBCUTANEOUS at 14:22

## 2018-09-15 NOTE — ED PROVIDER NOTE - PSH
History of colon resection  secondary to colon cancer, unable to determine date and extent of surgery at this time  No significant past surgical history

## 2018-09-15 NOTE — ED PROVIDER NOTE - MEDICAL DECISION MAKING DETAILS
hyperglycemia will r/o DKA vs HONK, will give IVF, recently was bacteremic, will check do sepsis w/u

## 2018-09-15 NOTE — ED ADULT NURSE NOTE - CHPI ED NUR SYMPTOMS NEG
no vomiting/no chills/no nausea/no fever/no dizziness/no decreased eating/drinking/no pain/no weakness/no tingling

## 2018-09-15 NOTE — ED PROVIDER NOTE - ATTENDING CONTRIBUTION TO CARE
Dr. Montavlo: I performed a face to face bedside interview with patient regarding history of present illness, review of symptoms and past medical history. I completed an independent physical exam.  I have discussed patient's plan of care with PA.   I agree with note as stated above, having amended the EMR as needed to reflect my findings.   This includes HISTORY OF PRESENT ILLNESS, HIV, PAST MEDICAL/SURGICAL/FAMILY/SOCIAL HISTORY, ALLERGIES AND HOME MEDICATIONS, REVIEW OF SYSTEMS, PHYSICAL EXAM, and any PROGRESS NOTES during the time I functioned as the attending physician for this patient.    Dr. Montalvo: This H&P has been written by myself in its entirety

## 2018-09-15 NOTE — ED PROVIDER NOTE - PROGRESS NOTE DETAILS
PAUL Guido: I participated in the care and evaluation of this patient, will continue to monitor Dr. Montalvo: FS improved, no AGAP, no s/sx of infection, will dc back to NH. Dr. Thomas selby. PAUL Guido: Spoke to Dr. Guzman, whom stated patient was hyperglycemic because [facility] did not have the levemir insulin patient was discharged on. He wants the insulin to be changed to lantus 16U nightly and humalog 8U every morning.   Patient stable for d/w, awaiting ambulance to pick her up

## 2018-09-15 NOTE — ED PROVIDER NOTE - OBJECTIVE STATEMENT
Dr. Montalvo: 83F h/o dementia, hyperglycemia on insulin, recently admitted for bacteremia and hyperglycemia, sent from NH for FS >600. Pt has no complaints.

## 2018-09-15 NOTE — ED PROVIDER NOTE - PMH
Colon cancer  staus post colon resection  Dementia    Hypertension, unspecified type    Pancreatic pseudocyst    Type 2 diabetes mellitus with complication, without long-term current use of insulin

## 2018-09-16 PROBLEM — C18.9 MALIGNANT NEOPLASM OF COLON, UNSPECIFIED: Chronic | Status: ACTIVE | Noted: 2018-08-26

## 2018-09-16 PROBLEM — K86.3 PSEUDOCYST OF PANCREAS: Chronic | Status: ACTIVE | Noted: 2017-09-24

## 2018-09-16 PROBLEM — I10 ESSENTIAL (PRIMARY) HYPERTENSION: Chronic | Status: ACTIVE | Noted: 2018-08-23

## 2018-09-16 PROBLEM — E11.8 TYPE 2 DIABETES MELLITUS WITH UNSPECIFIED COMPLICATIONS: Chronic | Status: ACTIVE | Noted: 2018-08-23

## 2018-09-16 PROBLEM — F03.90 UNSPECIFIED DEMENTIA WITHOUT BEHAVIORAL DISTURBANCE: Chronic | Status: ACTIVE | Noted: 2018-08-23

## 2018-09-16 LAB
B-OH-BUTYR SERPL-SCNC: 0.1 MMOL/L — SIGNIFICANT CHANGE UP
CULTURE RESULTS: NO GROWTH — SIGNIFICANT CHANGE UP
SPECIMEN SOURCE: SIGNIFICANT CHANGE UP

## 2018-09-19 RX ORDER — INSULIN ASPART 100 [IU]/ML
8 INJECTION, SUSPENSION SUBCUTANEOUS
Qty: 1 | Refills: 0 | OUTPATIENT
Start: 2018-09-19 | End: 2018-10-18

## 2018-09-20 ENCOUNTER — EMERGENCY (EMERGENCY)
Facility: HOSPITAL | Age: 83
LOS: 1 days | Discharge: ANOTHER HOSPITAL | End: 2018-09-20
Attending: EMERGENCY MEDICINE | Admitting: HOSPITALIST
Payer: COMMERCIAL

## 2018-09-20 VITALS
HEART RATE: 90 BPM | RESPIRATION RATE: 18 BRPM | DIASTOLIC BLOOD PRESSURE: 81 MMHG | HEIGHT: 64 IN | SYSTOLIC BLOOD PRESSURE: 137 MMHG | WEIGHT: 100.09 LBS | OXYGEN SATURATION: 96 % | TEMPERATURE: 98 F

## 2018-09-20 DIAGNOSIS — E11.8 TYPE 2 DIABETES MELLITUS WITH UNSPECIFIED COMPLICATIONS: ICD-10-CM

## 2018-09-20 DIAGNOSIS — I10 ESSENTIAL (PRIMARY) HYPERTENSION: ICD-10-CM

## 2018-09-20 DIAGNOSIS — Z90.49 ACQUIRED ABSENCE OF OTHER SPECIFIED PARTS OF DIGESTIVE TRACT: Chronic | ICD-10-CM

## 2018-09-20 DIAGNOSIS — R79.89 OTHER SPECIFIED ABNORMAL FINDINGS OF BLOOD CHEMISTRY: ICD-10-CM

## 2018-09-20 DIAGNOSIS — N39.0 URINARY TRACT INFECTION, SITE NOT SPECIFIED: ICD-10-CM

## 2018-09-20 DIAGNOSIS — F03.90 UNSPECIFIED DEMENTIA WITHOUT BEHAVIORAL DISTURBANCE: ICD-10-CM

## 2018-09-20 LAB
ALBUMIN SERPL ELPH-MCNC: 3.6 G/DL — SIGNIFICANT CHANGE UP (ref 3.3–5)
ALP SERPL-CCNC: 94 U/L — SIGNIFICANT CHANGE UP (ref 40–120)
ALT FLD-CCNC: 21 U/L DA — SIGNIFICANT CHANGE UP (ref 10–45)
ANION GAP SERPL CALC-SCNC: 10 MMOL/L — SIGNIFICANT CHANGE UP (ref 5–17)
APPEARANCE UR: ABNORMAL
APTT BLD: 34.9 SEC — SIGNIFICANT CHANGE UP (ref 27.5–37.4)
AST SERPL-CCNC: 20 U/L — SIGNIFICANT CHANGE UP (ref 10–40)
BACTERIA # UR AUTO: ABNORMAL /HPF
BASOPHILS # BLD AUTO: 0.1 K/UL — SIGNIFICANT CHANGE UP (ref 0–0.2)
BASOPHILS NFR BLD AUTO: 1.1 % — SIGNIFICANT CHANGE UP (ref 0–2)
BILIRUB SERPL-MCNC: 0.3 MG/DL — SIGNIFICANT CHANGE UP (ref 0.2–1.2)
BILIRUB UR-MCNC: NEGATIVE — SIGNIFICANT CHANGE UP
BUN SERPL-MCNC: 30 MG/DL — HIGH (ref 7–23)
CALCIUM SERPL-MCNC: 10.3 MG/DL — SIGNIFICANT CHANGE UP (ref 8.4–10.5)
CHLORIDE SERPL-SCNC: 100 MMOL/L — SIGNIFICANT CHANGE UP (ref 96–108)
CO2 SERPL-SCNC: 27 MMOL/L — SIGNIFICANT CHANGE UP (ref 22–31)
COLOR SPEC: YELLOW — SIGNIFICANT CHANGE UP
CREAT SERPL-MCNC: 1.34 MG/DL — HIGH (ref 0.5–1.3)
CULTURE RESULTS: SIGNIFICANT CHANGE UP
CULTURE RESULTS: SIGNIFICANT CHANGE UP
DIFF PNL FLD: ABNORMAL
EOSINOPHIL # BLD AUTO: 0.2 K/UL — SIGNIFICANT CHANGE UP (ref 0–0.5)
EOSINOPHIL NFR BLD AUTO: 2.2 % — SIGNIFICANT CHANGE UP (ref 0–6)
EPI CELLS # UR: SIGNIFICANT CHANGE UP
GLUCOSE SERPL-MCNC: 204 MG/DL — HIGH (ref 70–99)
GLUCOSE UR QL: 250
HCT VFR BLD CALC: 33.1 % — LOW (ref 34.5–45)
HGB BLD-MCNC: 9.8 G/DL — LOW (ref 11.5–15.5)
INR BLD: 0.95 RATIO — SIGNIFICANT CHANGE UP (ref 0.88–1.16)
KETONES UR-MCNC: NEGATIVE — SIGNIFICANT CHANGE UP
LACTATE SERPL-SCNC: 2.5 MMOL/L — HIGH (ref 0.7–2)
LACTATE SERPL-SCNC: 2.8 MMOL/L — HIGH (ref 0.7–2)
LEUKOCYTE ESTERASE UR-ACNC: ABNORMAL
LYMPHOCYTES # BLD AUTO: 0.9 K/UL — LOW (ref 1–3.3)
LYMPHOCYTES # BLD AUTO: 12.1 % — LOW (ref 13–44)
MCHC RBC-ENTMCNC: 29.4 PG — SIGNIFICANT CHANGE UP (ref 27–34)
MCHC RBC-ENTMCNC: 29.7 GM/DL — LOW (ref 32–36)
MCV RBC AUTO: 99.2 FL — SIGNIFICANT CHANGE UP (ref 80–100)
MONOCYTES # BLD AUTO: 0.6 K/UL — SIGNIFICANT CHANGE UP (ref 0–0.9)
MONOCYTES NFR BLD AUTO: 8.7 % — SIGNIFICANT CHANGE UP (ref 1–9)
NEUTROPHILS # BLD AUTO: 5.7 K/UL — SIGNIFICANT CHANGE UP (ref 1.8–7.4)
NEUTROPHILS NFR BLD AUTO: 76 % — SIGNIFICANT CHANGE UP (ref 43–77)
NITRITE UR-MCNC: NEGATIVE — SIGNIFICANT CHANGE UP
PH UR: 5 — SIGNIFICANT CHANGE UP (ref 5–8)
PLATELET # BLD AUTO: 246 K/UL — SIGNIFICANT CHANGE UP (ref 150–400)
POTASSIUM SERPL-MCNC: 4.6 MMOL/L — SIGNIFICANT CHANGE UP (ref 3.5–5.3)
POTASSIUM SERPL-SCNC: 4.6 MMOL/L — SIGNIFICANT CHANGE UP (ref 3.5–5.3)
PROT SERPL-MCNC: 8.6 G/DL — HIGH (ref 6–8.3)
PROT UR-MCNC: 500 MG/DL
PROTHROM AB SERPL-ACNC: 10.5 SEC — SIGNIFICANT CHANGE UP (ref 9.8–12.7)
RBC # BLD: 3.34 M/UL — LOW (ref 3.8–5.2)
RBC # FLD: 14.6 % — HIGH (ref 10.3–14.5)
RBC CASTS # UR COMP ASSIST: ABNORMAL /HPF (ref 0–4)
SODIUM SERPL-SCNC: 137 MMOL/L — SIGNIFICANT CHANGE UP (ref 135–145)
SP GR SPEC: 1.01 — SIGNIFICANT CHANGE UP (ref 1.01–1.02)
SPECIMEN SOURCE: SIGNIFICANT CHANGE UP
SPECIMEN SOURCE: SIGNIFICANT CHANGE UP
UROBILINOGEN FLD QL: NEGATIVE — SIGNIFICANT CHANGE UP
WBC # BLD: 7.5 K/UL — SIGNIFICANT CHANGE UP (ref 3.8–10.5)
WBC # FLD AUTO: 7.5 K/UL — SIGNIFICANT CHANGE UP (ref 3.8–10.5)
WBC UR QL: >50 /HPF (ref 0–5)

## 2018-09-20 PROCEDURE — 99218: CPT

## 2018-09-20 PROCEDURE — 71045 X-RAY EXAM CHEST 1 VIEW: CPT | Mod: 26

## 2018-09-20 PROCEDURE — 99285 EMERGENCY DEPT VISIT HI MDM: CPT | Mod: 25

## 2018-09-20 PROCEDURE — 93010 ELECTROCARDIOGRAM REPORT: CPT

## 2018-09-20 RX ORDER — QUETIAPINE FUMARATE 200 MG/1
25 TABLET, FILM COATED ORAL THREE TIMES A DAY
Qty: 0 | Refills: 0 | Status: DISCONTINUED | OUTPATIENT
Start: 2018-09-20 | End: 2018-09-24

## 2018-09-20 RX ORDER — MEMANTINE HYDROCHLORIDE 10 MG/1
1 TABLET ORAL
Qty: 0 | Refills: 0 | COMMUNITY

## 2018-09-20 RX ORDER — CEFTRIAXONE 500 MG/1
1 INJECTION, POWDER, FOR SOLUTION INTRAMUSCULAR; INTRAVENOUS EVERY 24 HOURS
Qty: 0 | Refills: 0 | Status: DISCONTINUED | OUTPATIENT
Start: 2018-09-20 | End: 2018-09-22

## 2018-09-20 RX ORDER — DEXTROSE 50 % IN WATER 50 %
15 SYRINGE (ML) INTRAVENOUS ONCE
Qty: 0 | Refills: 0 | Status: DISCONTINUED | OUTPATIENT
Start: 2018-09-20 | End: 2018-09-24

## 2018-09-20 RX ORDER — EZETIMIBE 10 MG/1
1 TABLET ORAL
Qty: 0 | Refills: 0 | COMMUNITY

## 2018-09-20 RX ORDER — PREGABALIN 225 MG/1
1000 CAPSULE ORAL DAILY
Qty: 0 | Refills: 0 | Status: DISCONTINUED | OUTPATIENT
Start: 2018-09-20 | End: 2018-09-24

## 2018-09-20 RX ORDER — INSULIN LISPRO 100/ML
VIAL (ML) SUBCUTANEOUS
Qty: 0 | Refills: 0 | Status: DISCONTINUED | OUTPATIENT
Start: 2018-09-20 | End: 2018-09-24

## 2018-09-20 RX ORDER — QUETIAPINE FUMARATE 200 MG/1
1 TABLET, FILM COATED ORAL
Qty: 0 | Refills: 0 | COMMUNITY

## 2018-09-20 RX ORDER — AMLODIPINE BESYLATE 2.5 MG/1
10 TABLET ORAL DAILY
Qty: 0 | Refills: 0 | Status: DISCONTINUED | OUTPATIENT
Start: 2018-09-20 | End: 2018-09-24

## 2018-09-20 RX ORDER — SODIUM CHLORIDE 9 MG/ML
1000 INJECTION, SOLUTION INTRAVENOUS
Qty: 0 | Refills: 0 | Status: DISCONTINUED | OUTPATIENT
Start: 2018-09-20 | End: 2018-09-24

## 2018-09-20 RX ORDER — RAMIPRIL 5 MG
1 CAPSULE ORAL
Qty: 0 | Refills: 0 | COMMUNITY

## 2018-09-20 RX ORDER — DEXTROSE 50 % IN WATER 50 %
12.5 SYRINGE (ML) INTRAVENOUS ONCE
Qty: 0 | Refills: 0 | Status: DISCONTINUED | OUTPATIENT
Start: 2018-09-20 | End: 2018-09-24

## 2018-09-20 RX ORDER — INSULIN ASPART 100 [IU]/ML
8 INJECTION, SOLUTION SUBCUTANEOUS
Qty: 0 | Refills: 0 | COMMUNITY

## 2018-09-20 RX ORDER — SERTRALINE 25 MG/1
25 TABLET, FILM COATED ORAL DAILY
Qty: 0 | Refills: 0 | Status: DISCONTINUED | OUTPATIENT
Start: 2018-09-20 | End: 2018-09-24

## 2018-09-20 RX ORDER — INSULIN DETEMIR 100/ML (3)
15 INSULIN PEN (ML) SUBCUTANEOUS
Qty: 0 | Refills: 0 | COMMUNITY

## 2018-09-20 RX ORDER — SODIUM CHLORIDE 9 MG/ML
1000 INJECTION INTRAMUSCULAR; INTRAVENOUS; SUBCUTANEOUS
Qty: 0 | Refills: 0 | Status: DISCONTINUED | OUTPATIENT
Start: 2018-09-20 | End: 2018-09-21

## 2018-09-20 RX ORDER — INSULIN GLARGINE 100 [IU]/ML
16 INJECTION, SOLUTION SUBCUTANEOUS EVERY MORNING
Qty: 0 | Refills: 0 | Status: DISCONTINUED | OUTPATIENT
Start: 2018-09-20 | End: 2018-09-24

## 2018-09-20 RX ORDER — AMLODIPINE BESYLATE 2.5 MG/1
0 TABLET ORAL
Qty: 0 | Refills: 0 | COMMUNITY

## 2018-09-20 RX ORDER — PANTOPRAZOLE SODIUM 20 MG/1
40 TABLET, DELAYED RELEASE ORAL
Qty: 0 | Refills: 0 | Status: DISCONTINUED | OUTPATIENT
Start: 2018-09-20 | End: 2018-09-24

## 2018-09-20 RX ORDER — SODIUM CHLORIDE 9 MG/ML
2000 INJECTION INTRAMUSCULAR; INTRAVENOUS; SUBCUTANEOUS ONCE
Qty: 0 | Refills: 0 | Status: COMPLETED | OUTPATIENT
Start: 2018-09-20 | End: 2018-09-20

## 2018-09-20 RX ORDER — TRAZODONE HCL 50 MG
25 TABLET ORAL AT BEDTIME
Qty: 0 | Refills: 0 | Status: DISCONTINUED | OUTPATIENT
Start: 2018-09-20 | End: 2018-09-24

## 2018-09-20 RX ORDER — DEXTROSE 50 % IN WATER 50 %
25 SYRINGE (ML) INTRAVENOUS ONCE
Qty: 0 | Refills: 0 | Status: DISCONTINUED | OUTPATIENT
Start: 2018-09-20 | End: 2018-09-24

## 2018-09-20 RX ORDER — DONEPEZIL HYDROCHLORIDE 10 MG/1
1 TABLET, FILM COATED ORAL
Qty: 0 | Refills: 0 | COMMUNITY

## 2018-09-20 RX ORDER — INSULIN LISPRO 100/ML
VIAL (ML) SUBCUTANEOUS AT BEDTIME
Qty: 0 | Refills: 0 | Status: DISCONTINUED | OUTPATIENT
Start: 2018-09-20 | End: 2018-09-24

## 2018-09-20 RX ORDER — SERTRALINE 25 MG/1
1 TABLET, FILM COATED ORAL
Qty: 0 | Refills: 0 | COMMUNITY

## 2018-09-20 RX ORDER — GLUCAGON INJECTION, SOLUTION 0.5 MG/.1ML
1 INJECTION, SOLUTION SUBCUTANEOUS ONCE
Qty: 0 | Refills: 0 | Status: DISCONTINUED | OUTPATIENT
Start: 2018-09-20 | End: 2018-09-24

## 2018-09-20 RX ORDER — CEFTRIAXONE 500 MG/1
1 INJECTION, POWDER, FOR SOLUTION INTRAMUSCULAR; INTRAVENOUS ONCE
Qty: 0 | Refills: 0 | Status: COMPLETED | OUTPATIENT
Start: 2018-09-20 | End: 2018-09-20

## 2018-09-20 RX ORDER — HEPARIN SODIUM 5000 [USP'U]/ML
5000 INJECTION INTRAVENOUS; SUBCUTANEOUS EVERY 8 HOURS
Qty: 0 | Refills: 0 | Status: DISCONTINUED | OUTPATIENT
Start: 2018-09-20 | End: 2018-09-24

## 2018-09-20 RX ADMIN — SODIUM CHLORIDE 2000 MILLILITER(S): 9 INJECTION INTRAMUSCULAR; INTRAVENOUS; SUBCUTANEOUS at 17:50

## 2018-09-20 RX ADMIN — QUETIAPINE FUMARATE 25 MILLIGRAM(S): 200 TABLET, FILM COATED ORAL at 22:57

## 2018-09-20 RX ADMIN — CEFTRIAXONE 100 GRAM(S): 500 INJECTION, POWDER, FOR SOLUTION INTRAMUSCULAR; INTRAVENOUS at 16:45

## 2018-09-20 RX ADMIN — Medication 25 MILLIGRAM(S): at 22:57

## 2018-09-20 RX ADMIN — CEFTRIAXONE 1 GRAM(S): 500 INJECTION, POWDER, FOR SOLUTION INTRAMUSCULAR; INTRAVENOUS at 17:15

## 2018-09-20 NOTE — ED PROVIDER NOTE - SHIFT CHANGE DETAILS
Rpt lactate after fluids. May be dc on PO abx if improved. Pt well appearing at this time. Patient signed out to incoming physician.  All decisions regarding the progression of care will be made at their discretion.

## 2018-09-20 NOTE — H&P ADULT - PROBLEM SELECTOR PLAN 2
IVFs overnight. repeat lactate.   clinically appears well and hemodynamically stable. observe in obs overnight

## 2018-09-20 NOTE — ED PROVIDER NOTE - ATTENDING CONTRIBUTION TO CARE
Barrera with PAUL Maynrad. Patient with abd pain and UTI concern. Patient with h/o bacteremia. Exam with suprapubic tenderness. +UTI. Lactate 2.4. Vitals stable. No elevation in WBC count. Pt awake and alert. Well appearing. WIll hydrate and repeat lactate. Will give abx IV here and determine if discharge is in sight. I performed a face to face bedside interview with patient regarding history of present illness, review of symptoms and past medical history. I completed an independent physical exam.  I have discussed the patient's plan of care with Physician Assistant (PA). I agree with note as stated above, having amended the EMR as needed to reflect my findings.   This includes History of Present Illness, HIV, Past Medical/Surgical/Family/Social History, Allergies and Home Medications, Review of Systems, Physical Exam, and any Progress Notes during the time I functioned as the attending physician for this patient.

## 2018-09-20 NOTE — H&P ADULT - HISTORY OF PRESENT ILLNESS
83F from [place] hx obtained fully from chart from [place] and from recent  admission  with hx of dementia, HTN, HLD, CKD3, T2IRDM, pancreatic pseudocyst, hx of s/p embolization of splenic artery pseudoaneurysm, hx of ?colon ca s/p resection s/p recent D/C from  8/31/18 for HONK and urosepsis with Ecoli pw suprapubic pain and hematuria and sent to ED for r/o UTI. Pt is USOH otherwise stable, eating. labs stable but with lactate 2.5 and on repeat 2.8.     Pt without complaints. pleasant, conversant.  Denied fevers, chills, SOB, CP, NVD or even dysuria. Can not recall medical hx. can give month of birthday.

## 2018-09-20 NOTE — H&P ADULT - ASSESSMENT
83F  hx of dementia, HTN, HLD, CKD3, T2IRDM, pancreatic pseudocyst, hx of s/p embolization of splenic artery pseudoaneurysm, hx of ?colon ca s/p resection s/p recent D/C from  8/31/18 for HONK and urosepsis with Ecoli pw UTI

## 2018-09-20 NOTE — ED ADULT NURSE NOTE - NSIMPLEMENTINTERV_GEN_ALL_ED
Implemented All Fall with Harm Risk Interventions:  [place] to call system. Call bell, personal items and telephone within reach. Instruct patient to call for assistance. Room bathroom lighting operational. Non-slip footwear when patient is off stretcher. Physically safe environment: no spills, clutter or unnecessary equipment. Stretcher in lowest position, wheels locked, appropriate side rails in place. Provide visual cue, wrist band, yellow gown, etc. Monitor gait and stability. Monitor for mental status changes and reorient to person, place, and time. Review medications for side effects contributing to fall risk. Reinforce activity limits and safety measures with patient and family. Provide visual clues: red socks.

## 2018-09-20 NOTE — H&P ADULT - NSHPLABSRESULTS_GEN_ALL_CORE
9.8    7.5   )-----------( 246      ( 20 Sep 2018 16:30 )             33.1           137  |  100  |  30<H>  ----------------------------<  204<H>  4.6   |  27  |  1.34<H>    Ca    10.3      20 Sep 2018 16:30    TPro  8.6<H>  /  Alb  3.6  /  TBili  0.3  /  DBili  x   /  AST  20  /  ALT  21  /  AlkPhos  94      Lactate, Blood: 2.8 mmol/L ( @ 19:55)  Lactate, Blood: 2.5 mmol/L ( @ 16:30)       LIVER FUNCTIONS - ( 20 Sep 2018 16:30 )  Alb: 3.6 g/dL / Pro: 8.6 g/dL / ALK PHOS: 94 U/L / ALT: 21 U/L DA / AST: 20 U/L / GGT: x               PT/INR - ( 20 Sep 2018 16:30 )   PT: 10.5 sec;   INR: 0.95 ratio         PTT - ( 20 Sep 2018 16:30 )  PTT:34.9 sec          Urinalysis Basic - ( 20 Sep 2018 17:00 )    Color: Yellow / Appearance: Turbid / S.015 / pH: x  Gluc: x / Ketone: Negative  / Bili: Negative / Urobili: Negative   Blood: x / Protein: 500 mg/dL / Nitrite: Negative   Leuk Esterase: Moderate / RBC: 5-10 /HPF / WBC >50 /HPF   Sq Epi: x / Non Sq Epi: Neg.-Few / Bacteria: Many /HPF        CAPILLARY BLOOD GLUCOSE            EKG: sinus rhythm at 64bpm, no acute ST changes      < from: Xray Chest 1 View-PORTABLE IMMEDIATE (18 @ 17:20) >      IMPRESSION:   Stable chest.  Negative for acute cardiopulmonary process.    < end of copied text >

## 2018-09-20 NOTE — ED PROVIDER NOTE - MEDICAL DECISION MAKING DETAILS
82 yo F pw pelvic/suprapubic pain and hematuria w/ ho UTI and urosepsis- will draw cultures, check urine.

## 2018-09-20 NOTE — ED PROVIDER NOTE - OBJECTIVE STATEMENT
83 year old female, BIBEMS from [place], PMx dementia, HTN, DM, UTI (hospitalized for bacteremia, urosepsis in August), GERD: presents to the ED for hematuria. As per patient she has been having mild suprapubic pain. Upon speaking with facility, they stated she was complaining to her aid of pelvic/low abdominal pain and when they went to collect a urine sample today she was found to have hematuria. Given her previous infections, they sent her to the ED for evaluation. Staff states patient has been eating/drinking normally, and has been of her normal mental status. No fevers, chills, vomiting, or diarrhea. Pt was previously on cefuroxime for E coli until 9/8.

## 2018-09-20 NOTE — H&P ADULT - NSHPPHYSICALEXAM_GEN_ALL_CORE
Vital Signs Last 24 Hrs  T(C): 36.7 (20 Sep 2018 20:30), Max: 36.8 (20 Sep 2018 15:41)  T(F): 98 (20 Sep 2018 20:30), Max: 98.2 (20 Sep 2018 15:41)  HR: 77 (20 Sep 2018 21:01) (77 - 90)  BP: 147/87 (20 Sep 2018 21:01) (125/83 - 147/87)  BP(mean): --  RR: 13 (20 Sep 2018 20:30) (13 - 18)  SpO2: 100% (20 Sep 2018 20:30) (96% - 100%)  Daily Height in cm: 162.56 (20 Sep 2018 15:41)    Daily   CAPILLARY BLOOD GLUCOSE        I&O's Summary      GENERAL: NAD, pleasant, alert  HEAD:  Normocephalic  EYES: EOMI, PERRLA, conjunctiva and sclera clear  ENMT: No tonsillar erythema, exudates, or enlargement; Moist mucous membranes, No lesions  NECK: Supple, No JVD, no bruit, normal thyroid  NERVOUS SYSTEM:  Alert & Oriented X1, grossly moves all fours. DTRs 2+ intact and symmetric  CHEST/LUNG: Clear to auscultation bilaterally; No rales, rhonchi, wheezing, or rubs  HEART: Regular rate and rhythm; No murmurs, rubs, or gallops  ABDOMEN: Soft, Nontender, Nondistended; Bowel sounds present  EXTREMITIES:  2+ Peripheral Pulses, No clubbing, cyanosis, or edema  LYMPH: No lymphadenopathy noted  SKIN: No rashes or lesions

## 2018-09-20 NOTE — H&P ADULT - PROBLEM SELECTOR PLAN 4
on 3 insulin regimens of Lantus, 2 mixed Novologs ?. cont Lantus 16U qam and adjust upwards as needed.

## 2018-09-21 DIAGNOSIS — E43 UNSPECIFIED SEVERE PROTEIN-CALORIE MALNUTRITION: ICD-10-CM

## 2018-09-21 LAB
ALBUMIN SERPL ELPH-MCNC: 3.1 G/DL — LOW (ref 3.3–5)
ALP SERPL-CCNC: 98 U/L — SIGNIFICANT CHANGE UP (ref 40–120)
ALT FLD-CCNC: 17 U/L DA — SIGNIFICANT CHANGE UP (ref 10–45)
ANION GAP SERPL CALC-SCNC: 8 MMOL/L — SIGNIFICANT CHANGE UP (ref 5–17)
AST SERPL-CCNC: 15 U/L — SIGNIFICANT CHANGE UP (ref 10–40)
BILIRUB SERPL-MCNC: 0.4 MG/DL — SIGNIFICANT CHANGE UP (ref 0.2–1.2)
BUN SERPL-MCNC: 22 MG/DL — SIGNIFICANT CHANGE UP (ref 7–23)
CALCIUM SERPL-MCNC: 9.6 MG/DL — SIGNIFICANT CHANGE UP (ref 8.4–10.5)
CHLORIDE SERPL-SCNC: 103 MMOL/L — SIGNIFICANT CHANGE UP (ref 96–108)
CO2 SERPL-SCNC: 26 MMOL/L — SIGNIFICANT CHANGE UP (ref 22–31)
CREAT SERPL-MCNC: 1.15 MG/DL — SIGNIFICANT CHANGE UP (ref 0.5–1.3)
GLUCOSE BLDC GLUCOMTR-MCNC: 172 MG/DL — HIGH (ref 70–99)
GLUCOSE BLDC GLUCOMTR-MCNC: 230 MG/DL — HIGH (ref 70–99)
GLUCOSE BLDC GLUCOMTR-MCNC: 307 MG/DL — HIGH (ref 70–99)
GLUCOSE SERPL-MCNC: 226 MG/DL — HIGH (ref 70–99)
HBA1C BLD-MCNC: 10 % — HIGH (ref 4–5.6)
HCT VFR BLD CALC: 30.2 % — LOW (ref 34.5–45)
HGB BLD-MCNC: 9.1 G/DL — LOW (ref 11.5–15.5)
LACTATE SERPL-SCNC: 1.8 MMOL/L — SIGNIFICANT CHANGE UP (ref 0.7–2)
LACTATE SERPL-SCNC: 2.1 MMOL/L — HIGH (ref 0.7–2)
MCHC RBC-ENTMCNC: 30.3 GM/DL — LOW (ref 32–36)
MCHC RBC-ENTMCNC: 30.3 PG — SIGNIFICANT CHANGE UP (ref 27–34)
MCV RBC AUTO: 100.1 FL — HIGH (ref 80–100)
PLATELET # BLD AUTO: 240 K/UL — SIGNIFICANT CHANGE UP (ref 150–400)
POTASSIUM SERPL-MCNC: 4.8 MMOL/L — SIGNIFICANT CHANGE UP (ref 3.5–5.3)
POTASSIUM SERPL-SCNC: 4.8 MMOL/L — SIGNIFICANT CHANGE UP (ref 3.5–5.3)
PROT SERPL-MCNC: 7.4 G/DL — SIGNIFICANT CHANGE UP (ref 6–8.3)
RBC # BLD: 3.02 M/UL — LOW (ref 3.8–5.2)
RBC # FLD: 14.3 % — SIGNIFICANT CHANGE UP (ref 10.3–14.5)
SODIUM SERPL-SCNC: 137 MMOL/L — SIGNIFICANT CHANGE UP (ref 135–145)
WBC # BLD: 5.2 K/UL — SIGNIFICANT CHANGE UP (ref 3.8–10.5)
WBC # FLD AUTO: 5.2 K/UL — SIGNIFICANT CHANGE UP (ref 3.8–10.5)

## 2018-09-21 RX ORDER — SODIUM CHLORIDE 9 MG/ML
1000 INJECTION INTRAMUSCULAR; INTRAVENOUS; SUBCUTANEOUS
Qty: 0 | Refills: 0 | Status: DISCONTINUED | OUTPATIENT
Start: 2018-09-21 | End: 2018-09-24

## 2018-09-21 RX ADMIN — Medication 0: at 21:42

## 2018-09-21 RX ADMIN — Medication 1: at 17:17

## 2018-09-21 RX ADMIN — HEPARIN SODIUM 5000 UNIT(S): 5000 INJECTION INTRAVENOUS; SUBCUTANEOUS at 14:05

## 2018-09-21 RX ADMIN — HEPARIN SODIUM 5000 UNIT(S): 5000 INJECTION INTRAVENOUS; SUBCUTANEOUS at 21:36

## 2018-09-21 RX ADMIN — HEPARIN SODIUM 5000 UNIT(S): 5000 INJECTION INTRAVENOUS; SUBCUTANEOUS at 00:13

## 2018-09-21 RX ADMIN — CEFTRIAXONE 100 GRAM(S): 500 INJECTION, POWDER, FOR SOLUTION INTRAMUSCULAR; INTRAVENOUS at 17:18

## 2018-09-21 RX ADMIN — QUETIAPINE FUMARATE 25 MILLIGRAM(S): 200 TABLET, FILM COATED ORAL at 14:05

## 2018-09-21 RX ADMIN — QUETIAPINE FUMARATE 25 MILLIGRAM(S): 200 TABLET, FILM COATED ORAL at 21:36

## 2018-09-21 RX ADMIN — INSULIN GLARGINE 16 UNIT(S): 100 INJECTION, SOLUTION SUBCUTANEOUS at 07:48

## 2018-09-21 RX ADMIN — PREGABALIN 1000 MICROGRAM(S): 225 CAPSULE ORAL at 11:59

## 2018-09-21 RX ADMIN — SERTRALINE 25 MILLIGRAM(S): 25 TABLET, FILM COATED ORAL at 11:59

## 2018-09-21 RX ADMIN — HEPARIN SODIUM 5000 UNIT(S): 5000 INJECTION INTRAVENOUS; SUBCUTANEOUS at 06:13

## 2018-09-21 RX ADMIN — Medication 2: at 07:49

## 2018-09-21 RX ADMIN — SODIUM CHLORIDE 60 MILLILITER(S): 9 INJECTION INTRAMUSCULAR; INTRAVENOUS; SUBCUTANEOUS at 17:26

## 2018-09-21 RX ADMIN — AMLODIPINE BESYLATE 10 MILLIGRAM(S): 2.5 TABLET ORAL at 06:13

## 2018-09-21 RX ADMIN — Medication 25 MILLIGRAM(S): at 21:39

## 2018-09-21 RX ADMIN — PANTOPRAZOLE SODIUM 40 MILLIGRAM(S): 20 TABLET, DELAYED RELEASE ORAL at 06:15

## 2018-09-21 RX ADMIN — SODIUM CHLORIDE 60 MILLILITER(S): 9 INJECTION INTRAMUSCULAR; INTRAVENOUS; SUBCUTANEOUS at 00:13

## 2018-09-21 RX ADMIN — Medication 4: at 11:57

## 2018-09-21 RX ADMIN — QUETIAPINE FUMARATE 25 MILLIGRAM(S): 200 TABLET, FILM COATED ORAL at 06:13

## 2018-09-21 NOTE — PROGRESS NOTE ADULT - ASSESSMENT
83F  hx of dementia, HTN, HLD, CKD3, T2IRDM, pancreatic pseudocyst, hx of s/p embolization of splenic artery pseudoaneurysm, hx of ?colon ca s/p resection s/p recent D/C from  8/31/18 for HONK and urosepsis with Ecoli p/w UTI and elevated lactate (NOT meeting sepsis criteria)    Attempted to reach son (Mr. Batista) at [phone] and [phone]. No . Will attempt again in AM

## 2018-09-21 NOTE — DIETITIAN INITIAL EVALUATION ADULT. - SOURCE
83F from [place] hx obtained fully from chart from [facility] and from recent  admission  with hx of dementia, HTN, HLD, CKD3, T2IRDM, pancreatic pseudocyst, hx of s/p embolization of splenic artery pseudoaneurysm, hx of ?colon ca s/p resection s/p recent D/C from  8/31/18 for HONK and urosepsis with Ecoli pw suprapubic pain and hematuria and sent to ED for r/o UTI. Pt is USOH otherwise stable, eating. labs stable but with lactate 2.5 and on repeat 2.8.

## 2018-09-21 NOTE — CHART NOTE - NSCHARTNOTEFT_GEN_A_CORE
Upon Nutritional Assessment by the Registered Dietitian your patient was determined to meet criteria / has evidence of the following diagnosis/diagnoses:          [ ]  Mild Protein Calorie Malnutrition        [ ]  Moderate Protein Calorie Malnutrition        [x ] Severe Protein Calorie Malnutrition        [ ] Unspecified Protein Calorie Malnutrition        [ ] Underweight / BMI <19        [ ] Morbid Obesity / BMI > 40      Findings as based on:  [x ] Comprehensive nutrition assessment   [x ] Nutrition Focused Physical Exam-evidence of loss of muscle mass/body fat  [ ] Other:       Nutrition Plan/Recommendations:  Provide Glucerna TID with meals , provide food in mechanical soft consistency to facilitate increase in po intake.         PROVIDER Section:     By signing this assessment you are acknowledging and agree with the diagnosis/diagnoses assigned by the Registered Dietitian    Comments:

## 2018-09-21 NOTE — PROGRESS NOTE ADULT - SUBJECTIVE AND OBJECTIVE BOX
Patient is a 83y old  Female who presents with a chief complaint of UTI (20 Sep 2018 21:36)    Patient seen and examined at bedside.    ALLERGIES:  lovastatin (Unknown)  penicillin (Unknown)  simvastatin (Unknown)  statins (Unknown)    MEDICATIONS  (STANDING):  amLODIPine   Tablet 10 milliGRAM(s) Oral daily  cefTRIAXone   IVPB 1 Gram(s) IV Intermittent every 24 hours  cyanocobalamin 1000 MICROGram(s) Oral daily  dextrose 5%. 1000 milliLiter(s) (50 mL/Hr) IV Continuous <Continuous>  dextrose 50% Injectable 12.5 Gram(s) IV Push once  dextrose 50% Injectable 25 Gram(s) IV Push once  dextrose 50% Injectable 25 Gram(s) IV Push once  heparin  Injectable 5000 Unit(s) SubCutaneous every 8 hours  insulin glargine Injectable (LANTUS) 16 Unit(s) SubCutaneous every morning  insulin lispro (HumaLOG) corrective regimen sliding scale   SubCutaneous three times a day before meals  insulin lispro (HumaLOG) corrective regimen sliding scale   SubCutaneous at bedtime  pantoprazole    Tablet 40 milliGRAM(s) Oral before breakfast  QUEtiapine 25 milliGRAM(s) Oral three times a day  sertraline 25 milliGRAM(s) Oral daily  sodium chloride 0.9%. 1000 milliLiter(s) (60 mL/Hr) IV Continuous <Continuous>  traZODone 25 milliGRAM(s) Oral at bedtime    MEDICATIONS  (PRN):  dextrose 40% Gel 15 Gram(s) Oral once PRN Blood Glucose LESS THAN 70 milliGRAM(s)/deciliter  glucagon  Injectable 1 milliGRAM(s) IntraMuscular once PRN Glucose LESS THAN 70 milligrams/deciliter    Vital Signs Last 24 Hrs  T(F): 97.9 (21 Sep 2018 11:54), Max: 98.2 (20 Sep 2018 15:41)  HR: 70 (21 Sep 2018 11:54) (63 - 90)  BP: 124/63 (21 Sep 2018 11:54) (124/63 - 147/87)  RR: 15 (21 Sep 2018 11:54) (13 - 18)  SpO2: 99% (21 Sep 2018 11:54) (96% - 100%)  I&O's Summary    21 Sep 2018 07:01  -  21 Sep 2018 15:01  --------------------------------------------------------  IN: 240 mL / OUT: 0 mL / NET: 240 mL      PHYSICAL EXAM:  General: NAD, A/O x 1, pleasant  ENT: MMM  Neck: Supple, No JVD  Lungs: Clear to auscultation bilaterally  Cardio: RRR, S1/S2  Abdomen: Soft, Nontender, Nondistended; Bowel sounds present  Extremities: No calf tenderness, No pitting edema    LABS:                        9.1    5.2   )-----------( 240      ( 21 Sep 2018 06:15 )             30.2         137  |  103  |  22  ----------------------------<  226  4.8   |  26  |  1.15    Ca    9.6      21 Sep 2018 06:15    TPro  7.4  /  Alb  3.1  /  TBili  0.4  /  DBili  x   /  AST  15  /  ALT  17  /  AlkPhos  98      eGFR if Non African American: 44 mL/min/1.73M2 (18 @ 06:15)  eGFR if : 51 mL/min/1.73M2 (18 @ 06:15)    PT/INR - ( 20 Sep 2018 16:30 )   PT: 10.5 sec;   INR: 0.95 ratio         PTT - ( 20 Sep 2018 16:30 )  PTT:34.9 sec  Lactate, Blood: 2.1 mmol/L ( @ 06:15)  Lactate, Blood: 1.8 mmol/L ( @ 01:00)  Lactate, Blood: 2.8 mmol/L ( @ 19:55)  Lactate, Blood: 2.5 mmol/L ( @ 16:30)         Chol 145 mg/dL LDL 91 mg/dL HDL 34 mg/dL Trig 98 mg/dL        CAPILLARY BLOOD GLUCOSE      POCT Blood Glucose.: 307 mg/dL (21 Sep 2018 11:28)  POCT Blood Glucose.: 213 mg/dL (21 Sep 2018 07:46)  POCT Blood Glucose.: 136 mg/dL (20 Sep 2018 23:05)     [id number] 10.0   [id number] 11.9    Urinalysis Basic - ( 20 Sep 2018 17:00 )    Color: Yellow / Appearance: Turbid / S.015 / pH: x  Gluc: x / Ketone: Negative  / Bili: Negative / Urobili: Negative   Blood: x / Protein: 500 mg/dL / Nitrite: Negative   Leuk Esterase: Moderate / RBC: 5-10 /HPF / WBC >50 /HPF   Sq Epi: x / Non Sq Epi: Neg.-Few / Bacteria: Many /HPF        RADIOLOGY & ADDITIONAL TESTS:  < from: Xray Chest 1 View-PORTABLE IMMEDIATE (18 @ 17:20) >  IMPRESSION:   Stable chest.  Negative for acute cardiopulmonary process.    < end of copied text >    < from: 12 Lead ECG (18 @ 16:32) >  Diagnosis Line Sinus rhythm with premature supraventricular complexes  Otherwise normal ECG  When compared with ECG of 15-SEP-2018 13:22,  premature supraventricular complexes are now present  Confirmed by SANDRA FARRIS, CRISTI IBRAHIM () on 2018 8:36:28 AM    < end of copied text >    Case d/w Herlinda Beltran, Dietitian

## 2018-09-22 ENCOUNTER — TRANSCRIPTION ENCOUNTER (OUTPATIENT)
Age: 83
End: 2018-09-22

## 2018-09-22 VITALS
OXYGEN SATURATION: 93 % | DIASTOLIC BLOOD PRESSURE: 55 MMHG | RESPIRATION RATE: 14 BRPM | HEART RATE: 67 BPM | TEMPERATURE: 98 F | SYSTOLIC BLOOD PRESSURE: 123 MMHG

## 2018-09-22 DIAGNOSIS — E11.65 TYPE 2 DIABETES MELLITUS WITH HYPERGLYCEMIA: ICD-10-CM

## 2018-09-22 LAB
-  AMIKACIN: SIGNIFICANT CHANGE UP
-  AMOXICILLIN/CLAVULANIC ACID: SIGNIFICANT CHANGE UP
-  AMPICILLIN/SULBACTAM: SIGNIFICANT CHANGE UP
-  AMPICILLIN: SIGNIFICANT CHANGE UP
-  AZTREONAM: SIGNIFICANT CHANGE UP
-  CEFAZOLIN: SIGNIFICANT CHANGE UP
-  CEFEPIME: SIGNIFICANT CHANGE UP
-  CEFOXITIN: SIGNIFICANT CHANGE UP
-  CEFTRIAXONE: SIGNIFICANT CHANGE UP
-  CIPROFLOXACIN: SIGNIFICANT CHANGE UP
-  ERTAPENEM: SIGNIFICANT CHANGE UP
-  GENTAMICIN: SIGNIFICANT CHANGE UP
-  IMIPENEM: SIGNIFICANT CHANGE UP
-  LEVOFLOXACIN: SIGNIFICANT CHANGE UP
-  MEROPENEM: SIGNIFICANT CHANGE UP
-  NITROFURANTOIN: SIGNIFICANT CHANGE UP
-  PIPERACILLIN/TAZOBACTAM: SIGNIFICANT CHANGE UP
-  TIGECYCLINE: SIGNIFICANT CHANGE UP
-  TOBRAMYCIN: SIGNIFICANT CHANGE UP
-  TRIMETHOPRIM/SULFAMETHOXAZOLE: SIGNIFICANT CHANGE UP
ANION GAP SERPL CALC-SCNC: 8 MMOL/L — SIGNIFICANT CHANGE UP (ref 5–17)
BUN SERPL-MCNC: 20 MG/DL — SIGNIFICANT CHANGE UP (ref 7–23)
CALCIUM SERPL-MCNC: 9.3 MG/DL — SIGNIFICANT CHANGE UP (ref 8.4–10.5)
CHLORIDE SERPL-SCNC: 103 MMOL/L — SIGNIFICANT CHANGE UP (ref 96–108)
CO2 SERPL-SCNC: 27 MMOL/L — SIGNIFICANT CHANGE UP (ref 22–31)
CREAT SERPL-MCNC: 1.18 MG/DL — SIGNIFICANT CHANGE UP (ref 0.5–1.3)
CULTURE RESULTS: SIGNIFICANT CHANGE UP
GLUCOSE BLDC GLUCOMTR-MCNC: 128 MG/DL — HIGH (ref 70–99)
GLUCOSE BLDC GLUCOMTR-MCNC: 195 MG/DL — HIGH (ref 70–99)
GLUCOSE SERPL-MCNC: 212 MG/DL — HIGH (ref 70–99)
HCT VFR BLD CALC: 30 % — LOW (ref 34.5–45)
HGB BLD-MCNC: 9.3 G/DL — LOW (ref 11.5–15.5)
LACTATE SERPL-SCNC: 1.6 MMOL/L — SIGNIFICANT CHANGE UP (ref 0.7–2)
MCHC RBC-ENTMCNC: 30.5 PG — SIGNIFICANT CHANGE UP (ref 27–34)
MCHC RBC-ENTMCNC: 30.9 GM/DL — LOW (ref 32–36)
MCV RBC AUTO: 98.7 FL — SIGNIFICANT CHANGE UP (ref 80–100)
METHOD TYPE: SIGNIFICANT CHANGE UP
ORGANISM # SPEC MICROSCOPIC CNT: SIGNIFICANT CHANGE UP
ORGANISM # SPEC MICROSCOPIC CNT: SIGNIFICANT CHANGE UP
PLATELET # BLD AUTO: 246 K/UL — SIGNIFICANT CHANGE UP (ref 150–400)
POTASSIUM SERPL-MCNC: 4.4 MMOL/L — SIGNIFICANT CHANGE UP (ref 3.5–5.3)
POTASSIUM SERPL-SCNC: 4.4 MMOL/L — SIGNIFICANT CHANGE UP (ref 3.5–5.3)
RBC # BLD: 3.04 M/UL — LOW (ref 3.8–5.2)
RBC # FLD: 13.8 % — SIGNIFICANT CHANGE UP (ref 10.3–14.5)
SODIUM SERPL-SCNC: 138 MMOL/L — SIGNIFICANT CHANGE UP (ref 135–145)
SPECIMEN SOURCE: SIGNIFICANT CHANGE UP
WBC # BLD: 5.2 K/UL — SIGNIFICANT CHANGE UP (ref 3.8–10.5)
WBC # FLD AUTO: 5.2 K/UL — SIGNIFICANT CHANGE UP (ref 3.8–10.5)

## 2018-09-22 PROCEDURE — 96365 THER/PROPH/DIAG IV INF INIT: CPT

## 2018-09-22 PROCEDURE — 85027 COMPLETE CBC AUTOMATED: CPT

## 2018-09-22 PROCEDURE — 87086 URINE CULTURE/COLONY COUNT: CPT

## 2018-09-22 PROCEDURE — 99285 EMERGENCY DEPT VISIT HI MDM: CPT | Mod: 25

## 2018-09-22 PROCEDURE — G0378: CPT

## 2018-09-22 PROCEDURE — 71045 X-RAY EXAM CHEST 1 VIEW: CPT

## 2018-09-22 PROCEDURE — 83036 HEMOGLOBIN GLYCOSYLATED A1C: CPT

## 2018-09-22 PROCEDURE — 82962 GLUCOSE BLOOD TEST: CPT

## 2018-09-22 PROCEDURE — 80048 BASIC METABOLIC PNL TOTAL CA: CPT

## 2018-09-22 PROCEDURE — 93005 ELECTROCARDIOGRAM TRACING: CPT

## 2018-09-22 PROCEDURE — 96372 THER/PROPH/DIAG INJ SC/IM: CPT | Mod: XU

## 2018-09-22 PROCEDURE — 80053 COMPREHEN METABOLIC PANEL: CPT

## 2018-09-22 PROCEDURE — 87186 SC STD MICRODIL/AGAR DIL: CPT

## 2018-09-22 PROCEDURE — 83605 ASSAY OF LACTIC ACID: CPT

## 2018-09-22 PROCEDURE — 87040 BLOOD CULTURE FOR BACTERIA: CPT

## 2018-09-22 PROCEDURE — 85610 PROTHROMBIN TIME: CPT

## 2018-09-22 PROCEDURE — 85730 THROMBOPLASTIN TIME PARTIAL: CPT

## 2018-09-22 PROCEDURE — 81001 URINALYSIS AUTO W/SCOPE: CPT

## 2018-09-22 RX ORDER — INSULIN LISPRO 100 [IU]/ML
8 INJECTION, SUSPENSION SUBCUTANEOUS
Qty: 0 | Refills: 0 | COMMUNITY

## 2018-09-22 RX ORDER — INSULIN ASPART 100 [IU]/ML
8 INJECTION, SUSPENSION SUBCUTANEOUS
Qty: 0 | Refills: 0 | COMMUNITY

## 2018-09-22 RX ADMIN — HEPARIN SODIUM 5000 UNIT(S): 5000 INJECTION INTRAVENOUS; SUBCUTANEOUS at 14:33

## 2018-09-22 RX ADMIN — HEPARIN SODIUM 5000 UNIT(S): 5000 INJECTION INTRAVENOUS; SUBCUTANEOUS at 05:50

## 2018-09-22 RX ADMIN — QUETIAPINE FUMARATE 25 MILLIGRAM(S): 200 TABLET, FILM COATED ORAL at 14:29

## 2018-09-22 RX ADMIN — SODIUM CHLORIDE 60 MILLILITER(S): 9 INJECTION INTRAMUSCULAR; INTRAVENOUS; SUBCUTANEOUS at 11:34

## 2018-09-22 RX ADMIN — PANTOPRAZOLE SODIUM 40 MILLIGRAM(S): 20 TABLET, DELAYED RELEASE ORAL at 05:54

## 2018-09-22 RX ADMIN — QUETIAPINE FUMARATE 25 MILLIGRAM(S): 200 TABLET, FILM COATED ORAL at 05:51

## 2018-09-22 RX ADMIN — INSULIN GLARGINE 16 UNIT(S): 100 INJECTION, SOLUTION SUBCUTANEOUS at 08:33

## 2018-09-22 RX ADMIN — SERTRALINE 25 MILLIGRAM(S): 25 TABLET, FILM COATED ORAL at 12:12

## 2018-09-22 RX ADMIN — Medication 1: at 12:35

## 2018-09-22 RX ADMIN — AMLODIPINE BESYLATE 10 MILLIGRAM(S): 2.5 TABLET ORAL at 05:51

## 2018-09-22 RX ADMIN — PREGABALIN 1000 MICROGRAM(S): 225 CAPSULE ORAL at 12:08

## 2018-09-22 NOTE — DISCHARGE NOTE ADULT - CARE PROVIDER_API CALL
Walter Guzman), Family Medicine Medicine  [address] [place] [address]  Phone: [phone]  Fax: [phone]

## 2018-09-22 NOTE — PROGRESS NOTE ADULT - ATTENDING COMMENTS
E. Coli UTI, on day 3/3 of Abx  No fevers, no leukocytosis, at baseline state  Stable for transfer back to [facility] d/w son, Brianne Batista, who agrees with plan    I have personally seen and examined patient on the above date.  I discussed the case with RUI Calvo and I agree with findings and plan as detailed per note above, which I have amended where appropriate.

## 2018-09-22 NOTE — DISCHARGE NOTE ADULT - HOSPITAL COURSE
83F from [place] hx obtained fully from chart from [place] and from recent  admission  with hx of dementia, HTN, HLD, CKD3, T2IRDM, pancreatic pseudocyst, hx of s/p embolization of splenic artery pseudoaneurysm, hx of ?colon ca s/p resection s/p recent D/C from  8/31/18 for HONK and urosepsis with Ecoli pw suprapubic pain and hematuria and now admitted with UTI.  Pt treated with course of abx.  Clinically improved.  Discharged back to [place].

## 2018-09-22 NOTE — PROGRESS NOTE ADULT - PROBLEM SELECTOR PROBLEM 5
Dementia without behavioral disturbance, unspecified dementia type Severe protein-calorie malnutrition

## 2018-09-22 NOTE — DISCHARGE NOTE ADULT - PLAN OF CARE
prevent further compromise finish course of abx Antibiotics have been completed Will need to adjust insulin dose based on sugars

## 2018-09-22 NOTE — DISCHARGE NOTE ADULT - MEDICATION SUMMARY - MEDICATIONS TO STOP TAKING
I will STOP taking the medications listed below when I get home from the hospital:  None I will STOP taking the medications listed below when I get home from the hospital:    HumaLOG Mix 75/25 subcutaneous suspension  -- 8 unit(s) subcutaneous once a day (in the morning)    NovoLOG Mix 70/30 subcutaneous suspension  -- 8 unit(s) subcutaneous once a day (in the morning)

## 2018-09-22 NOTE — PROGRESS NOTE ADULT - PROBLEM SELECTOR PROBLEM 6
Severe protein-calorie malnutrition Type 2 diabetes mellitus with hyperglycemia, with long-term current use of insulin

## 2018-09-22 NOTE — DISCHARGE NOTE ADULT - MEDICATION SUMMARY - MEDICATIONS TO TAKE
I will START or STAY ON the medications listed below when I get home from the hospital:    ramipril 5 mg oral capsule  -- 1 cap(s) by mouth once a day  -- Indication: For Hypertension, unspecified type    sertraline 25 mg oral tablet  -- 1 tab(s) by mouth once a day  -- Indication: For Dementia without behavioral disturbance, unspecified dementia type    traZODone 50 mg oral tablet  -- 25 milligram(s) by mouth once a day (at bedtime)  -- Indication: For Dementia without behavioral disturbance, unspecified dementia type    HumaLOG Mix 75/25 subcutaneous suspension  -- 8 unit(s) subcutaneous once a day (in the morning)  -- Indication: For Type 2 diabetes mellitus with complication, without long-term current use of insulin    Lantus 100 units/mL subcutaneous solution  -- 16 unit(s) subcutaneous once a day (in the morning)  -- Indication: For Type 2 diabetes mellitus with complication, without long-term current use of insulin    metFORMIN 500 mg oral tablet  -- 1 tab(s) by mouth 2 times a day  -- Indication: For Type 2 diabetes mellitus with complication, without long-term current use of insulin    NovoLOG Mix 70/30 subcutaneous suspension  -- 8 unit(s) subcutaneous once a day (in the morning)  -- Indication: For Type 2 diabetes mellitus with complication, without long-term current use of insulin    QUEtiapine 25 mg oral tablet  -- 1 tab(s) by mouth 3 times a day  -- Indication: For Dementia without behavioral disturbance, unspecified dementia type    amLODIPine 10 mg oral tablet  -- 1 tab(s) by mouth once a day  -- Indication: For Hypertension, unspecified type    omeprazole 40 mg oral delayed release capsule  -- 1 cap(s) by mouth once a day  -- Indication: For GERD    cyanocobalamin 1000 mcg oral tablet  -- 1 tab(s) by mouth   -- Indication: For Supplement I will START or STAY ON the medications listed below when I get home from the hospital:    ramipril 5 mg oral capsule  -- 1 cap(s) by mouth once a day  -- Indication: For Hypertension, unspecified type    sertraline 25 mg oral tablet  -- 1 tab(s) by mouth once a day  -- Indication: For Dementia without behavioral disturbance, unspecified dementia type    traZODone 50 mg oral tablet  -- 25 milligram(s) by mouth once a day (at bedtime)  -- Indication: For Dementia without behavioral disturbance, unspecified dementia type    Lantus 100 units/mL subcutaneous solution  -- 16 unit(s) subcutaneous once a day (in the morning)  -- Indication: For Type 2 diabetes mellitus with complication, without long-term current use of insulin    metFORMIN 500 mg oral tablet  -- 1 tab(s) by mouth 2 times a day  -- Indication: For Type 2 diabetes mellitus with complication, without long-term current use of insulin    QUEtiapine 25 mg oral tablet  -- 1 tab(s) by mouth 3 times a day  -- Indication: For Dementia without behavioral disturbance, unspecified dementia type    amLODIPine 10 mg oral tablet  -- 1 tab(s) by mouth once a day  -- Indication: For Hypertension, unspecified type    omeprazole 40 mg oral delayed release capsule  -- 1 cap(s) by mouth once a day  -- Indication: For GERD    cyanocobalamin 1000 mcg oral tablet  -- 1 tab(s) by mouth   -- Indication: For Supplement

## 2018-09-22 NOTE — PROGRESS NOTE ADULT - PROBLEM SELECTOR PROBLEM 4
Type 2 diabetes mellitus with complication, without long-term current use of insulin Dementia without behavioral disturbance, unspecified dementia type

## 2018-09-22 NOTE — PROGRESS NOTE ADULT - ASSESSMENT
83F  hx of dementia, HTN, HLD, CKD3, T2IRDM, pancreatic pseudocyst, hx of s/p embolization of splenic artery pseudoaneurysm, hx of ?colon ca s/p resection s/p recent D/C from  8/31/18 for HONK and urosepsis with Ecoli p/w UTI and elevated lactate (NOT meeting sepsis criteria)

## 2018-09-22 NOTE — PROGRESS NOTE ADULT - PROBLEM SELECTOR PLAN 6
nutrition following  continue supplemental feeding as necessary Labile blood sugars, improved today, outpatient adjustment of insulin dose

## 2018-09-22 NOTE — PROGRESS NOTE ADULT - SUBJECTIVE AND OBJECTIVE BOX
Patient is a 83y old  Female who presents with a chief complaint of UTI (21 Sep 2018 15:01)    Patient seen and examined at bedside.    ALLERGIES:  lovastatin (Unknown)  penicillin (Unknown)  simvastatin (Unknown)  statins (Unknown)        Vital Signs Last 24 Hrs  T(F): 97.8 (22 Sep 2018 05:22), Max: 98.2 (21 Sep 2018 20:22)  HR: 68 (22 Sep 2018 05:22) (68 - 74)  BP: 148/91 (22 Sep 2018 05:22) (110/58 - 148/91)  RR: 14 (22 Sep 2018 05:22) (14 - 15)  SpO2: 98% (22 Sep 2018 05:22) (98% - 100%)  I&O's Summary    21 Sep 2018 07:01  -  22 Sep 2018 07:00  --------------------------------------------------------  IN: 420 mL / OUT: 0 mL / NET: 420 mL      MEDICATIONS:  amLODIPine   Tablet 10 milliGRAM(s) Oral daily  cefTRIAXone   IVPB 1 Gram(s) IV Intermittent every 24 hours  cyanocobalamin 1000 MICROGram(s) Oral daily  dextrose 40% Gel 15 Gram(s) Oral once PRN  dextrose 5%. 1000 milliLiter(s) IV Continuous <Continuous>  dextrose 50% Injectable 12.5 Gram(s) IV Push once  dextrose 50% Injectable 25 Gram(s) IV Push once  dextrose 50% Injectable 25 Gram(s) IV Push once  glucagon  Injectable 1 milliGRAM(s) IntraMuscular once PRN  heparin  Injectable 5000 Unit(s) SubCutaneous every 8 hours  insulin glargine Injectable (LANTUS) 16 Unit(s) SubCutaneous every morning  insulin lispro (HumaLOG) corrective regimen sliding scale   SubCutaneous three times a day before meals  insulin lispro (HumaLOG) corrective regimen sliding scale   SubCutaneous at bedtime  pantoprazole    Tablet 40 milliGRAM(s) Oral before breakfast  QUEtiapine 25 milliGRAM(s) Oral three times a day  sertraline 25 milliGRAM(s) Oral daily  sodium chloride 0.9%. 1000 milliLiter(s) IV Continuous <Continuous>  traZODone 25 milliGRAM(s) Oral at bedtime      PHYSICAL EXAM:  General: NAD, Alert  ENT: MMM  Neck: Supple, No JVD  Lungs: Clear to auscultation bilaterally  Cardio: S1S2 regular  Abdomen: Soft, Nontender, Nondistended; Bowel sounds present  Extremities: No cyanosis, No edema    LABS:                        9.1    5.2   )-----------( 240      ( 21 Sep 2018 06:15 )             30.2         137  |  103  |  22  ----------------------------<  226  4.8   |  26  |  1.15    Ca    9.6      21 Sep 2018 06:15    TPro  7.4  /  Alb  3.1  /  TBili  0.4  /  DBili  x   /  AST  15  /  ALT  17  /  AlkPhos  98      eGFR if Non African American: 44 mL/min/1.73M2 (18 @ 06:15)  eGFR if : 51 mL/min/1.73M2 (18 @ 06:15)    PT/INR - ( 20 Sep 2018 16:30 )   PT: 10.5 sec;   INR: 0.95 ratio         PTT - ( 20 Sep 2018 16:30 )  PTT:34.9 sec  Lactate, Blood: 2.1 mmol/L ( @ 06:15)  Lactate, Blood: 1.8 mmol/L ( @ 01:00)  Lactate, Blood: 2.8 mmol/L ( @ 19:55)  Lactate, Blood: 2.5 mmol/L ( @ 16:30)         Chol 145 mg/dL LDL 91 mg/dL HDL 34 mg/dL Trig 98 mg/dL        CAPILLARY BLOOD GLUCOSE      POCT Blood Glucose.: 128 mg/dL (22 Sep 2018 07:50)  POCT Blood Glucose.: 230 mg/dL (21 Sep 2018 21:28)  POCT Blood Glucose.: 172 mg/dL (21 Sep 2018 17:02)  POCT Blood Glucose.: 307 mg/dL (21 Sep 2018 11:28)     [id number] 10.0   [id number] 11.9    Urinalysis Basic - ( 20 Sep 2018 17:00 )    Color: Yellow / Appearance: Turbid / S.015 / pH: x  Gluc: x / Ketone: Negative  / Bili: Negative / Urobili: Negative   Blood: x / Protein: 500 mg/dL / Nitrite: Negative   Leuk Esterase: Moderate / RBC: 5-10 /HPF / WBC >50 /HPF   Sq Epi: x / Non Sq Epi: Neg.-Few / Bacteria: Many /HPF        Culture - Urine (collected 20 Sep 2018 17:00)  Source: .Urine Clean Catch (Midstream)  Preliminary Report (21 Sep 2018 17:40):    >100,000 CFU/ml Escherichia coli    Culture - Blood (collected 20 Sep 2018 16:30)  Source: .Blood Blood-Peripheral  Preliminary Report (21 Sep 2018 20:02):    No growth to date.    Culture - Blood (collected 20 Sep 2018 16:30)  Source: .Blood Blood-Peripheral  Preliminary Report (21 Sep 2018 20:02):    No growth to date.    RADIOLOGY & ADDITIONAL TESTS:    < from: Xray Chest 1 View-PORTABLE IMMEDIATE (18 @ 17:20) >  EXAM:  XR CHEST PORTABLE IMMED 1V      PROCEDURE DATE:  2018        INTERPRETATION:    DATE OF STUDY: 18.    PRIOR: 9/15/18.    CLINICAL INDICATION: 83-yo-female with weakness; sepsis.    TECHNIQUE: portable chest - done upright.    FINDINGS:   Thoracic aortic atheromatous changes and ectasia noted.  The heart is magnified by technique.   No acute congestive changes seen.  No bilateral focal infiltrates. No pleural effusion or pneumothorax.   No free subdiaphragmatic air noted.  There are degenerative changes of the bones.    IMPRESSION:   Stable chest.  Negative for acute cardiopulmonary process.                    NICOLE PACHECO   This document has been electronically signed. Sep 20 2018  5:48PM    < from: 12 Lead ECG (18 @ 16:32) >    Ventricular Rate 64 BPM    Atrial Rate 64 BPM    P-R Interval 176 ms    QRS Duration 68 ms    Q-T Interval 386 ms    QTC Calculation(Bezet) 398 ms    P Axis 69 degrees    R Axis 52 degrees    T Axis 74 degrees    Diagnosis Line Sinus rhythm with premature supraventricular complexes  Otherwise normal ECG  When compared with ECG of 15-SEP-2018 13:22,  premature supraventricular complexes are now present  Confirmed by SANDRA FARRIS, CRISTI IBRAHIM () on 2018 8:36:28 AM                    Care Discussed with Consultants/Other Providers:

## 2018-09-22 NOTE — DISCHARGE NOTE ADULT - PATIENT PORTAL LINK FT
You can access the [facility] Patient Portal, offered by [facility], by registering with the following website: [email or web address]

## 2018-09-22 NOTE — DISCHARGE NOTE ADULT - CARE PLAN
Principal Discharge DX:	Urinary tract infection with hematuria, site unspecified  Goal:	prevent further compromise  Assessment and plan of treatment:	finish course of abx  Secondary Diagnosis:	Dementia without behavioral disturbance, unspecified dementia type  Secondary Diagnosis:	Hypertension, unspecified type  Secondary Diagnosis:	Severe protein-calorie malnutrition  Secondary Diagnosis:	Type 2 diabetes mellitus with complication, without long-term current use of insulin Principal Discharge DX:	Urinary tract infection with hematuria, site unspecified  Goal:	prevent further compromise  Assessment and plan of treatment:	Antibiotics have been completed  Secondary Diagnosis:	Dementia without behavioral disturbance, unspecified dementia type  Secondary Diagnosis:	Hypertension, unspecified type  Secondary Diagnosis:	Severe protein-calorie malnutrition  Secondary Diagnosis:	Type 2 diabetes mellitus with hyperglycemia, with long-term current use of insulin  Assessment and plan of treatment:	Will need to adjust insulin dose based on sugars

## 2018-09-22 NOTE — DISCHARGE NOTE ADULT - SECONDARY DIAGNOSIS.
Dementia without behavioral disturbance, unspecified dementia type Hypertension, unspecified type Severe protein-calorie malnutrition Type 2 diabetes mellitus with complication, without long-term current use of insulin Type 2 diabetes mellitus with hyperglycemia, with long-term current use of insulin

## 2018-09-23 PROCEDURE — 80061 LIPID PANEL: CPT

## 2018-09-23 PROCEDURE — 81001 URINALYSIS AUTO W/SCOPE: CPT

## 2018-09-23 PROCEDURE — 82962 GLUCOSE BLOOD TEST: CPT

## 2018-09-23 PROCEDURE — 87186 SC STD MICRODIL/AGAR DIL: CPT

## 2018-09-23 PROCEDURE — 80048 BASIC METABOLIC PNL TOTAL CA: CPT

## 2018-09-23 PROCEDURE — 83690 ASSAY OF LIPASE: CPT

## 2018-09-23 PROCEDURE — 76770 US EXAM ABDO BACK WALL COMP: CPT

## 2018-09-23 PROCEDURE — 74176 CT ABD & PELVIS W/O CONTRAST: CPT

## 2018-09-23 PROCEDURE — 87086 URINE CULTURE/COLONY COUNT: CPT

## 2018-09-23 PROCEDURE — 82009 KETONE BODYS QUAL: CPT

## 2018-09-23 PROCEDURE — 97161 PT EVAL LOW COMPLEX 20 MIN: CPT

## 2018-09-23 PROCEDURE — 87150 DNA/RNA AMPLIFIED PROBE: CPT

## 2018-09-23 PROCEDURE — 84100 ASSAY OF PHOSPHORUS: CPT

## 2018-09-23 PROCEDURE — 71045 X-RAY EXAM CHEST 1 VIEW: CPT

## 2018-09-23 PROCEDURE — 99284 EMERGENCY DEPT VISIT MOD MDM: CPT | Mod: 25

## 2018-09-23 PROCEDURE — 85027 COMPLETE CBC AUTOMATED: CPT

## 2018-09-23 PROCEDURE — 80053 COMPREHEN METABOLIC PANEL: CPT

## 2018-09-23 PROCEDURE — 84145 PROCALCITONIN (PCT): CPT

## 2018-09-23 PROCEDURE — 96374 THER/PROPH/DIAG INJ IV PUSH: CPT

## 2018-09-23 PROCEDURE — 87040 BLOOD CULTURE FOR BACTERIA: CPT

## 2018-09-23 PROCEDURE — 83735 ASSAY OF MAGNESIUM: CPT

## 2018-09-23 PROCEDURE — 96361 HYDRATE IV INFUSION ADD-ON: CPT

## 2018-09-23 PROCEDURE — 96376 TX/PRO/DX INJ SAME DRUG ADON: CPT

## 2018-09-23 PROCEDURE — 83605 ASSAY OF LACTIC ACID: CPT

## 2019-02-26 ENCOUNTER — INPATIENT (INPATIENT)
Facility: HOSPITAL | Age: 84
LOS: 1 days | Discharge: HOME | DRG: 639 | End: 2019-02-28
Attending: HOSPITALIST | Admitting: HOSPITALIST
Payer: COMMERCIAL

## 2019-02-26 VITALS
TEMPERATURE: 98 F | HEART RATE: 83 BPM | RESPIRATION RATE: 16 BRPM | SYSTOLIC BLOOD PRESSURE: 126 MMHG | DIASTOLIC BLOOD PRESSURE: 74 MMHG | OXYGEN SATURATION: 95 %

## 2019-02-26 DIAGNOSIS — F03.90 UNSPECIFIED DEMENTIA WITHOUT BEHAVIORAL DISTURBANCE: ICD-10-CM

## 2019-02-26 DIAGNOSIS — E16.2 HYPOGLYCEMIA, UNSPECIFIED: ICD-10-CM

## 2019-02-26 DIAGNOSIS — F39 UNSPECIFIED MOOD [AFFECTIVE] DISORDER: ICD-10-CM

## 2019-02-26 DIAGNOSIS — Z90.49 ACQUIRED ABSENCE OF OTHER SPECIFIED PARTS OF DIGESTIVE TRACT: Chronic | ICD-10-CM

## 2019-02-26 DIAGNOSIS — N17.9 ACUTE KIDNEY FAILURE, UNSPECIFIED: ICD-10-CM

## 2019-02-26 DIAGNOSIS — E11.8 TYPE 2 DIABETES MELLITUS WITH UNSPECIFIED COMPLICATIONS: ICD-10-CM

## 2019-02-26 DIAGNOSIS — R55 SYNCOPE AND COLLAPSE: ICD-10-CM

## 2019-02-26 DIAGNOSIS — I10 ESSENTIAL (PRIMARY) HYPERTENSION: ICD-10-CM

## 2019-02-26 LAB
ALBUMIN SERPL ELPH-MCNC: 3.1 G/DL — LOW (ref 3.3–5)
ALP SERPL-CCNC: 87 U/L — SIGNIFICANT CHANGE UP (ref 40–120)
ALT FLD-CCNC: 10 U/L DA — SIGNIFICANT CHANGE UP (ref 10–45)
ANION GAP SERPL CALC-SCNC: 11 MMOL/L — SIGNIFICANT CHANGE UP (ref 5–17)
APPEARANCE UR: CLEAR — SIGNIFICANT CHANGE UP
APTT BLD: 34 SEC — SIGNIFICANT CHANGE UP (ref 27.5–36.3)
AST SERPL-CCNC: 23 U/L — SIGNIFICANT CHANGE UP (ref 10–40)
BACTERIA # UR AUTO: ABNORMAL /HPF
BASOPHILS # BLD AUTO: 0.1 K/UL — SIGNIFICANT CHANGE UP (ref 0–0.2)
BASOPHILS NFR BLD AUTO: 1.8 % — SIGNIFICANT CHANGE UP (ref 0–2)
BILIRUB SERPL-MCNC: 0.3 MG/DL — SIGNIFICANT CHANGE UP (ref 0.2–1.2)
BILIRUB UR-MCNC: NEGATIVE — SIGNIFICANT CHANGE UP
BUN SERPL-MCNC: 29 MG/DL — HIGH (ref 7–23)
CALCIUM SERPL-MCNC: 9.5 MG/DL — SIGNIFICANT CHANGE UP (ref 8.4–10.5)
CHLORIDE SERPL-SCNC: 103 MMOL/L — SIGNIFICANT CHANGE UP (ref 96–108)
CO2 SERPL-SCNC: 24 MMOL/L — SIGNIFICANT CHANGE UP (ref 22–31)
COLOR SPEC: YELLOW — SIGNIFICANT CHANGE UP
CREAT SERPL-MCNC: 1.49 MG/DL — HIGH (ref 0.5–1.3)
DIFF PNL FLD: ABNORMAL
EOSINOPHIL # BLD AUTO: 0.2 K/UL — SIGNIFICANT CHANGE UP (ref 0–0.5)
EOSINOPHIL NFR BLD AUTO: 3.1 % — SIGNIFICANT CHANGE UP (ref 0–6)
EPI CELLS # UR: SIGNIFICANT CHANGE UP
GLUCOSE BLDC GLUCOMTR-MCNC: 123 MG/DL — HIGH (ref 70–99)
GLUCOSE BLDC GLUCOMTR-MCNC: 162 MG/DL — HIGH (ref 70–99)
GLUCOSE BLDC GLUCOMTR-MCNC: 203 MG/DL — HIGH (ref 70–99)
GLUCOSE BLDC GLUCOMTR-MCNC: 210 MG/DL — HIGH (ref 70–99)
GLUCOSE BLDC GLUCOMTR-MCNC: 63 MG/DL — LOW (ref 70–99)
GLUCOSE BLDC GLUCOMTR-MCNC: 66 MG/DL — LOW (ref 70–99)
GLUCOSE BLDC GLUCOMTR-MCNC: 70 MG/DL — SIGNIFICANT CHANGE UP (ref 70–99)
GLUCOSE BLDC GLUCOMTR-MCNC: 71 MG/DL — SIGNIFICANT CHANGE UP (ref 70–99)
GLUCOSE SERPL-MCNC: 74 MG/DL — SIGNIFICANT CHANGE UP (ref 70–99)
GLUCOSE UR QL: NEGATIVE — SIGNIFICANT CHANGE UP
HCT VFR BLD CALC: 29.2 % — LOW (ref 34.5–45)
HGB BLD-MCNC: 8.7 G/DL — LOW (ref 11.5–15.5)
INR BLD: 0.97 RATIO — SIGNIFICANT CHANGE UP (ref 0.88–1.16)
KETONES UR-MCNC: NEGATIVE — SIGNIFICANT CHANGE UP
LEUKOCYTE ESTERASE UR-ACNC: ABNORMAL
LYMPHOCYTES # BLD AUTO: 1 K/UL — SIGNIFICANT CHANGE UP (ref 1–3.3)
LYMPHOCYTES # BLD AUTO: 15.7 % — SIGNIFICANT CHANGE UP (ref 13–44)
MCHC RBC-ENTMCNC: 28.9 PG — SIGNIFICANT CHANGE UP (ref 27–34)
MCHC RBC-ENTMCNC: 29.7 GM/DL — LOW (ref 32–36)
MCV RBC AUTO: 97.2 FL — SIGNIFICANT CHANGE UP (ref 80–100)
MONOCYTES # BLD AUTO: 0.5 K/UL — SIGNIFICANT CHANGE UP (ref 0–0.9)
MONOCYTES NFR BLD AUTO: 8.1 % — SIGNIFICANT CHANGE UP (ref 1–9)
NEUTROPHILS # BLD AUTO: 4.7 K/UL — SIGNIFICANT CHANGE UP (ref 1.8–7.4)
NEUTROPHILS NFR BLD AUTO: 71.3 % — SIGNIFICANT CHANGE UP (ref 43–77)
NITRITE UR-MCNC: NEGATIVE — SIGNIFICANT CHANGE UP
PH UR: 5 — SIGNIFICANT CHANGE UP (ref 5–8)
PLATELET # BLD AUTO: 231 K/UL — SIGNIFICANT CHANGE UP (ref 150–400)
POTASSIUM SERPL-MCNC: 4.6 MMOL/L — SIGNIFICANT CHANGE UP (ref 3.5–5.3)
POTASSIUM SERPL-SCNC: 4.6 MMOL/L — SIGNIFICANT CHANGE UP (ref 3.5–5.3)
PROT SERPL-MCNC: 7.9 G/DL — SIGNIFICANT CHANGE UP (ref 6–8.3)
PROT UR-MCNC: 30 MG/DL
PROTHROM AB SERPL-ACNC: 10.8 SEC — SIGNIFICANT CHANGE UP (ref 10–12.9)
RBC # BLD: 3 M/UL — LOW (ref 3.8–5.2)
RBC # FLD: 13.1 % — SIGNIFICANT CHANGE UP (ref 10.3–14.5)
RBC CASTS # UR COMP ASSIST: SIGNIFICANT CHANGE UP /HPF (ref 0–4)
SODIUM SERPL-SCNC: 138 MMOL/L — SIGNIFICANT CHANGE UP (ref 135–145)
SP GR SPEC: 1.01 — SIGNIFICANT CHANGE UP (ref 1.01–1.02)
TROPONIN I SERPL-MCNC: 0.02 NG/ML — SIGNIFICANT CHANGE UP (ref 0.02–0.06)
TROPONIN I SERPL-MCNC: <.017 NG/ML — LOW (ref 0.02–0.06)
UROBILINOGEN FLD QL: NEGATIVE — SIGNIFICANT CHANGE UP
WBC # BLD: 6.6 K/UL — SIGNIFICANT CHANGE UP (ref 3.8–10.5)
WBC # FLD AUTO: 6.6 K/UL — SIGNIFICANT CHANGE UP (ref 3.8–10.5)
WBC UR QL: ABNORMAL /HPF (ref 0–5)

## 2019-02-26 PROCEDURE — 99223 1ST HOSP IP/OBS HIGH 75: CPT | Mod: GC

## 2019-02-26 PROCEDURE — 70450 CT HEAD/BRAIN W/O DYE: CPT | Mod: 26

## 2019-02-26 PROCEDURE — 93010 ELECTROCARDIOGRAM REPORT: CPT

## 2019-02-26 PROCEDURE — 71045 X-RAY EXAM CHEST 1 VIEW: CPT | Mod: 26

## 2019-02-26 PROCEDURE — 99285 EMERGENCY DEPT VISIT HI MDM: CPT

## 2019-02-26 PROCEDURE — 71250 CT THORAX DX C-: CPT | Mod: 26

## 2019-02-26 RX ORDER — SODIUM CHLORIDE 9 MG/ML
1000 INJECTION, SOLUTION INTRAVENOUS
Qty: 0 | Refills: 0 | Status: DISCONTINUED | OUTPATIENT
Start: 2019-02-26 | End: 2019-02-28

## 2019-02-26 RX ORDER — GLUCAGON INJECTION, SOLUTION 0.5 MG/.1ML
1 INJECTION, SOLUTION SUBCUTANEOUS ONCE
Qty: 0 | Refills: 0 | Status: DISCONTINUED | OUTPATIENT
Start: 2019-02-26 | End: 2019-02-28

## 2019-02-26 RX ORDER — HEPARIN SODIUM 5000 [USP'U]/ML
5000 INJECTION INTRAVENOUS; SUBCUTANEOUS EVERY 12 HOURS
Qty: 0 | Refills: 0 | Status: DISCONTINUED | OUTPATIENT
Start: 2019-02-26 | End: 2019-02-28

## 2019-02-26 RX ORDER — DEXTROSE 50 % IN WATER 50 %
50 SYRINGE (ML) INTRAVENOUS ONCE
Qty: 0 | Refills: 0 | Status: COMPLETED | OUTPATIENT
Start: 2019-02-26 | End: 2019-02-26

## 2019-02-26 RX ORDER — DEXTROSE 50 % IN WATER 50 %
12.5 SYRINGE (ML) INTRAVENOUS ONCE
Qty: 0 | Refills: 0 | Status: COMPLETED | OUTPATIENT
Start: 2019-02-26 | End: 2019-02-26

## 2019-02-26 RX ORDER — INSULIN ASPART 100 [IU]/ML
14 INJECTION, SUSPENSION SUBCUTANEOUS
Qty: 14 | Refills: 0 | OUTPATIENT
Start: 2019-02-26

## 2019-02-26 RX ORDER — DEXTROSE 50 % IN WATER 50 %
10 SYRINGE (ML) INTRAVENOUS ONCE
Qty: 0 | Refills: 0 | Status: COMPLETED | OUTPATIENT
Start: 2019-02-26 | End: 2019-02-26

## 2019-02-26 RX ORDER — PANTOPRAZOLE SODIUM 20 MG/1
40 TABLET, DELAYED RELEASE ORAL
Qty: 0 | Refills: 0 | Status: DISCONTINUED | OUTPATIENT
Start: 2019-02-26 | End: 2019-02-28

## 2019-02-26 RX ORDER — LISINOPRIL 2.5 MG/1
5 TABLET ORAL DAILY
Qty: 0 | Refills: 0 | Status: DISCONTINUED | OUTPATIENT
Start: 2019-02-26 | End: 2019-02-26

## 2019-02-26 RX ORDER — DEXTROSE 50 % IN WATER 50 %
12.5 SYRINGE (ML) INTRAVENOUS ONCE
Qty: 0 | Refills: 0 | Status: DISCONTINUED | OUTPATIENT
Start: 2019-02-26 | End: 2019-02-28

## 2019-02-26 RX ORDER — QUETIAPINE FUMARATE 200 MG/1
1 TABLET, FILM COATED ORAL
Qty: 0 | Refills: 0 | COMMUNITY

## 2019-02-26 RX ORDER — INSULIN LISPRO 100/ML
VIAL (ML) SUBCUTANEOUS
Qty: 0 | Refills: 0 | Status: DISCONTINUED | OUTPATIENT
Start: 2019-02-26 | End: 2019-02-28

## 2019-02-26 RX ORDER — INSULIN GLARGINE 100 [IU]/ML
16 INJECTION, SOLUTION SUBCUTANEOUS
Qty: 0 | Refills: 0 | COMMUNITY

## 2019-02-26 RX ORDER — DEXTROSE 50 % IN WATER 50 %
25 SYRINGE (ML) INTRAVENOUS ONCE
Qty: 0 | Refills: 0 | Status: DISCONTINUED | OUTPATIENT
Start: 2019-02-26 | End: 2019-02-28

## 2019-02-26 RX ORDER — RISPERIDONE 4 MG/1
0.25 TABLET ORAL
Qty: 0 | Refills: 0 | Status: DISCONTINUED | OUTPATIENT
Start: 2019-02-26 | End: 2019-02-28

## 2019-02-26 RX ORDER — TRAZODONE HCL 50 MG
25 TABLET ORAL AT BEDTIME
Qty: 0 | Refills: 0 | Status: DISCONTINUED | OUTPATIENT
Start: 2019-02-26 | End: 2019-02-28

## 2019-02-26 RX ORDER — SODIUM CHLORIDE 9 MG/ML
3 INJECTION INTRAMUSCULAR; INTRAVENOUS; SUBCUTANEOUS ONCE
Qty: 0 | Refills: 0 | Status: COMPLETED | OUTPATIENT
Start: 2019-02-26 | End: 2019-02-26

## 2019-02-26 RX ORDER — INSULIN GLARGINE 100 [IU]/ML
12 INJECTION, SOLUTION SUBCUTANEOUS
Qty: 0 | Refills: 0 | COMMUNITY

## 2019-02-26 RX ORDER — DEXTROSE 50 % IN WATER 50 %
15 SYRINGE (ML) INTRAVENOUS ONCE
Qty: 0 | Refills: 0 | Status: DISCONTINUED | OUTPATIENT
Start: 2019-02-26 | End: 2019-02-28

## 2019-02-26 RX ORDER — AMLODIPINE BESYLATE 2.5 MG/1
10 TABLET ORAL DAILY
Qty: 0 | Refills: 0 | Status: DISCONTINUED | OUTPATIENT
Start: 2019-02-26 | End: 2019-02-28

## 2019-02-26 RX ORDER — RISPERIDONE 4 MG/1
0.25 TABLET ORAL AT BEDTIME
Qty: 0 | Refills: 0 | Status: DISCONTINUED | OUTPATIENT
Start: 2019-02-26 | End: 2019-02-28

## 2019-02-26 RX ORDER — SERTRALINE 25 MG/1
1 TABLET, FILM COATED ORAL
Qty: 0 | Refills: 0 | COMMUNITY

## 2019-02-26 RX ORDER — LISINOPRIL 2.5 MG/1
5 TABLET ORAL DAILY
Qty: 0 | Refills: 0 | Status: DISCONTINUED | OUTPATIENT
Start: 2019-02-26 | End: 2019-02-28

## 2019-02-26 RX ADMIN — Medication 25 MILLIGRAM(S): at 22:51

## 2019-02-26 RX ADMIN — RISPERIDONE 0.25 MILLIGRAM(S): 4 TABLET ORAL at 20:05

## 2019-02-26 RX ADMIN — Medication 50 MILLILITER(S): at 17:35

## 2019-02-26 RX ADMIN — RISPERIDONE 0.25 MILLIGRAM(S): 4 TABLET ORAL at 20:50

## 2019-02-26 RX ADMIN — Medication 10 GRAM(S): at 16:13

## 2019-02-26 RX ADMIN — SODIUM CHLORIDE 3 MILLILITER(S): 9 INJECTION INTRAMUSCULAR; INTRAVENOUS; SUBCUTANEOUS at 16:14

## 2019-02-26 RX ADMIN — Medication 12.5 GRAM(S): at 22:31

## 2019-02-26 NOTE — H&P ADULT - PROBLEM SELECTOR PLAN 3
FS have not been controlled--spoke with PMD who reports some FS have been as high as 500  -Given recently hypoglycemia, will place on ISS  -Fup HgbA1c

## 2019-02-26 NOTE — H&P ADULT - NSHPREVIEWOFSYSTEMS_GEN_ALL_CORE
REVIEW OF SYSTEMS:  CONSTITUTIONAL: unable to assess due to dementia  EYES: No eye pain, visual disturbances, or discharge  ENMT:  +difficult hearing  NECK: unable to assess due to dementia  BREASTS: unable to assess due to dementia  RESPIRATORY: unable to assess due to dementia  CARDIOVASCULAR: unable to assess due to dementia  GASTROINTESTINAL: unable to assess due to dementia  GENITOURINARY: unable to assess due to dementia  NEUROLOGICAL: unable to assess due to dementia  SKIN: Nunable to assess due to dementia   LYMPH NODES:unable to assess due to dementia  ENDOCRINE: unable to assess due to dementia  MUSCULOSKELETAL: unable to assess due to dementia  PSYCHIATRIC: unable to assess due to dementia  HEME/LYMPH: unable to assess due to dementia  ALLERY AND IMMUNOLOGIC: No hives or eczema    ALL ROS REVIEWED AND NORMAL EXCEPT AS STATED ABOVE

## 2019-02-26 NOTE — ED PROVIDER NOTE - OBJECTIVE STATEMENT
pt is a 84yo female bib ems from [facility] with pmhx of ckd, htn, dm2 on insulin, dementia, colon cancer, presents with near syncope. per ems, pt had episode where she "fell over" on to another resident without loc or head injury. pt possibly incontinent at time of incident but unclear. consulted [facility] times no answer. pt reports suprapubic pressure at this time. pt denies any other symptoms.  pmd: remy

## 2019-02-26 NOTE — H&P ADULT - PROBLEM SELECTOR PLAN 2
no signs of infection  After discussion with PMD, he reports that patient's FS have been erratic (at times in 500s)--he has been adjusting her insulin regimen.  Also, contributing is that Nursing facility is shortstaffed and has been unable to perform frequent FS monitoring  For now, will start low dose ISS

## 2019-02-26 NOTE — H&P ADULT - ASSESSMENT
82yo F from [facility] living [facility] with PMHx of CKD, HTN, DM (poorly controlled), dementia, colon cancer p/w  with  near syncope most likely secondary to hypoglycemia.  Unable to obtain a thorough history from patient due to poor historian secondary to dementia.    CAPRINI SCORE [CLOT]    AGE RELATED RISK FACTORS                                                       MOBILITY RELATED FACTORS  [ ] Age 41-60 years                                            (1 Point)                  [ ] Bed rest                                                        (1 Point)  [ ] Age: 61-74 years                                           (2 Points)                 [ ] Plaster cast                                                   (2 Points)  [x ] Age= 75 years                                              (3 Points)                 [ ] Bed bound for more than 72 hours                 (2 Points)    DISEASE RELATED RISK FACTORS                                               GENDER SPECIFIC FACTORS  [ ] Edema in the lower extremities                       (1 Point)                  [ ] Pregnancy                                                     (1 Point)  [ ] Varicose veins                                               (1 Point)                  [ ] Post-partum < 6 weeks                                   (1 Point)             [ ] BMI > 25 Kg/m2                                            (1 Point)                  [ ] Hormonal therapy  or oral contraception          (1 Point)                 [ ] Sepsis (in the previous month)                        (1 Point)                  [ ] History of pregnancy complications                 (1 point)  [ ] Pneumonia or serious lung disease                                               [ ] Unexplained or recurrent                     (1 Point)           (in the previous month)                               (1 Point)  [ ] Abnormal pulmonary function test                     (1 Point)                 SURGERY RELATED RISK FACTORS  [ ] Acute myocardial infarction                              (1 Point)                 [ ]  Section                                             (1 Point)  [ ] Congestive heart failure (in the previous month)  (1 Point)               [ ] Minor surgery                                                  (1 Point)   [ ] Inflammatory bowel disease                             (1 Point)                 [ ] Arthroscopic surgery                                        (2 Points)  [ ] Central venous access                                      (2 Points)                [ ] General surgery lasting more than 45 minutes   (2 Points)       [ ] Stroke (in the previous month)                          (5 Points)               [ ] Elective arthroplasty                                         (5 Points)                                                                                                                                               HEMATOLOGY RELATED FACTORS                                                 TRAUMA RELATED RISK FACTORS  [ ] Prior episodes of VTE                                     (3 Points)                 [ ] Fracture of the hip, pelvis, or leg                       (5 Points)  [ ] Positive family history for VTE                         (3 Points)                 [ ] Acute spinal cord injury (in the previous month)  (5 Points)  [ ] Prothrombin 36618 A                                     (3 Points)                 [ ] Paralysis  (less than 1 month)                             (5 Points)  [ ] Factor V Leiden                                             (3 Points)                  [ ] Multiple Trauma within 1 month                        (5 Points)  [ ] Lupus anticoagulants                                     (3 Points)                                                           [ ] Anticardiolipin antibodies                               (3 Points)                                                       [ ] High homocysteine in the blood                      (3 Points)                                             [ ] Other congenital or acquired thrombophilia      (3 Points)                                                [ ] Heparin induced thrombocytopenia                  (3 Points)                                          Total Score [          ]

## 2019-02-26 NOTE — H&P ADULT - NSHPLABSRESULTS_GEN_ALL_CORE
< from: CT Chest No Cont (02.26.19 @ 16:38) >    IMPRESSION: Scattered bilateral small groundglass nodules likely   infectious/inflammatory in etiology    < end of copied text >    < from: CT Head No Cont (02.26.19 @ 16:38) >    Impression: No acute intracranial hemorrhage.     1.6 x 2.0 cm hypodensity in the right cerebellum; this may represent an   age indeterminate infarct. Mild hypodensity in the left frontal lobe may   represent an age indeterminate territorial infarct. If clinically   indicated, brain MR may be pursued for further evaluation.    < end of copied text >    < from: Xray Chest 1 View- PORTABLE-Routine (02.26.19 @ 15:48) >    IMPRESSION: There is right retrocardiac opacity demonstrated with   somewhat rounded morphology. Further evaluation with CT examination of   the chest is recommended.    Results discussed with the ordering ED clinician (DAPHNE Gee).    < end of copied text >    Urinalysis + Microscopic Examination (02.26.19 @ 17:25)    Urine Appearance: Clear    Urobilinogen: Negative    Specific Gravity: 1.010    Protein, Urine: 30 mg/dL    pH Urine: 5.0    Color: Yellow    Leukocyte Esterase Concentration: Small    Nitrite: Negative    Ketone - Urine: Negative    Bilirubin: Negative    Glucose Qualitative, Urine: Negative    Blood, Urine: Moderate    Red Blood Cell - Urine: 0-4 /HPF    White Blood Cell - Urine: 6-10 /HPF    Epithelial Cells: Neg.-Few    Bacteria: Few /HPF    Troponin I, Serum (02.26.19 @ 15:55)    Troponin I, Serum: <.017: The new reference range for Troponin-I performed on the Siemens EXL  system is 0.017-0.056 ng/mL which includes the 99th percentile of a  healthy reference population. Studies have shown that elevated troponin  levels above the cutoff are associated with an increased risk for adverse  cardiac events, with the risk increasing as troponin levels increase.  As  per a joint committee of the American College of Cardiology and   Society of Cardiology, diagnosis of classic MI is based upon the  detection of a rise or fall of cardiac troponin values, with at least one  value above the 99th percentile upper reference limit, in the appropriate  clinical context.  · Troponin I (ng/mL) Interpretation  · 0.017-0.056  Normal range (includes the 99th percentile of a healthy  reference population)  · >0.056  Elevated troponin level indicating increased risk  · Note: Troponin-I and Troponin T cannot be used interchangeably in  serial measurements.  Minimally elevated Troponin results should be  interpreted in the context of clinical findings and risk factors. ng/mL

## 2019-02-26 NOTE — ED PROVIDER NOTE - ATTENDING CONTRIBUTION TO CARE
Pt seen and examined with [facility] Alessia Gee agree with H and P as documented with corrections/additions noted in chart and below; I personally was present for key points of the exam and all procedures  ? syncopal episode at [facility] with decreased responsiveness and ? episode of incontinence no seizure activity noted   EKG non contributory   Plan admitted for syncopal workup to tele ? metabolic causes i.e. hypoglycemia which was noted during stay

## 2019-02-26 NOTE — H&P ADULT - PROBLEM SELECTOR PLAN 1
presyncope most likely secondary to hypoglycemia  No signs of infection  CT head w no ICH-1.6 x 2.0 cm hypodensity in the right cerebellum; this may represent an age indeterminate infarct  patient with long hx of dementia-- presyncope most likely secondary to hypoglycemia  No signs of infection  Will rule out cardiac event although unlikely--> trops, TTE ordered  CT head w no ICH-1.6 x 2.0 cm hypodensity in the right cerebellum; this may represent an age indeterminate infarct  patient with long hx of dementia--

## 2019-02-26 NOTE — PATIENT PROFILE ADULT - ANY SIGNIFICANT CHANGE IN ABILITY TO PERFORM THE FOLLOWING ADL SINCE THE ONSET OF PRESENT ILLNESS?
normal/airway patent/good air movement/clear to auscultation bilaterally/breath sounds equal/respirations non-labored
airway patent/normal/no rhonchi/no wheezes/no rales
clear to auscultation bilaterally/normal/breath sounds equal/respirations non-labored/good air movement/airway patent
no

## 2019-02-26 NOTE — H&P ADULT - PROBLEM SELECTOR PLAN 5
Spoke with daughter in law who reports pt has a long standing history of dementia  Not interested in pursing MRI brain

## 2019-02-26 NOTE — H&P ADULT - PROBLEM SELECTOR PLAN 7
cont Risperidone    DVT ppx: [facility]  Spoke with PMD, Dr Guzman who is aware of admission  Also spoke on phone with daughter in law and son Lester who is HCP, who confirmed DNR/DNI status and will fill out MOLST tomorrow

## 2019-02-26 NOTE — ED PROVIDER NOTE - CLINICAL SUMMARY MEDICAL DECISION MAKING FREE TEXT BOX
pt with possible near syncope. pt reports suprapubic pressure at this time, possible uti? consulted [facility] times no answer. will work up for syncope and uti. will do labs, trop, ekg, cxr, ua and ct head. will re-eval pt with possible near syncope. pt reports suprapubic pressure at this time, possible uti? consulted [facility] times no answer. will work up for syncope and uti. will do labs, trop, ekg, cxr, ua . will re-eval pt with possible near syncope. pt reports suprapubic pressure at this time, possible uti? consulted [facility] times no answer. will work up for syncope and uti. will do labs, trop, ekg, cxr, ua, dextrose,. will re-eval

## 2019-02-26 NOTE — H&P ADULT - NSHPSOCIALHISTORY_GEN_ALL_CORE
Lives at [facility]   recently  from dementia, as per PMD Dr Guzman Lives at [place]   recently  from dementia, as per PMD Dr Guzman    Unable to obtain smoking history due to dementia

## 2019-02-26 NOTE — ED ADULT NURSE NOTE - OBJECTIVE STATEMENT
Patient is from [place] on the sound, EMS states that she had a near syncopal episode. Patient is aox1 name at this time. Patient is a poor historian. Patient denies any pain, shortness of breath, dizziness or palpitations.

## 2019-02-26 NOTE — ED PROVIDER NOTE - PROGRESS NOTE DETAILS
passed dysphagia screen passed dysphagia screen, swallowing without issue. pt cxr abnormal will do ct chest. labs and imaging reviewed. possible cva acute vs age indeterminate.  will admit. discussed with dr correa

## 2019-02-26 NOTE — H&P ADULT - ATTENDING COMMENTS
I have personally seen and examined patient on the above date.  I discussed the case with PAUL Eddy and I agree with findings and plan as detailed per note above, which I have amended where appropriate.      Patient had a near-syncopal event. Differential includes dehydration, hypoglycemia, less likely unsteady gait from cerebellar lesion. Trend troponins. Check echo. PT eval. Monitor fingersticks. Gentle IVF. Orthostatic BP x 1.

## 2019-02-26 NOTE — H&P ADULT - HISTORY OF PRESENT ILLNESS
84yo F from [place] assisted living [facility] with PMHx of CKD, HTN, DM (poorly controlled), dementia, colon cancer brought in my EMD with  near syncope. Per EMS, pt had episode where she "fell over" on to another resident without loc or head injury.  Attempted to call [place] facility with no answer.  Spoke with PMD, Dr Guzman who reports that patient's FS have been erratic at [place], given lack of nursing staff to monitor FS regularly.  Unable to obtain a thorough history from patient due to poor historian secondary to dementia.  Patient denies any fever, chills, chest pain, SOB, abdominal pain, N/V, diarrhea.    In ED, FS 70 and repeat 68. Given glucose tab and 1 amp glucagon with improvement of sugar to 210.  CT head with no ICH.  CT chest with small ground glass nodules. EKG nonischemic. Troponin negative. UA unremarkable.  Admitted to hospitalist for further workup 84yo F from [place] assisted living [facility] with PMHx of CKD, HTN, DM (poorly controlled), dementia, colon cancer brought in my EMD with  near syncope. Per EMS, pt had episode where she "fell over" on to another resident without loc or head injury.  Attempted to call [facility] with no answer.  Spoke with PMD, Dr Guzman who reports that patient's FS have been erratic at [place], given lack of nursing staff to monitor FS regularly.  Unable to obtain a thorough history from patient due to poor historian secondary to dementia.  Patient denies any fever, chills, chest pain, SOB, abdominal pain, N/V, diarrhea.    In ED, FS 70 and repeat 68. Given glucose tab and 1 amp glucagon with improvement of sugar to 210.  CT head with no ICH, 1.6 x 2.0 cm hypodensity in the right cerebellum; this may represent an age indeterminate infarct.  CT chest with small ground glass nodules. EKG nonischemic. Troponin negative. UA unremarkable.  Admitted to hospitalist for further workup 84yo F from [place] assisted living [facility] with PMHx of CKD stage 3, HTN, DM (poorly controlled), dementia, colon cancer brought in my EMD with  near syncope. Per EMS, pt had episode where she "fell over" on to another resident without loc or head injury.  Attempted to call [facility] with no answer.  Spoke with PMD, Dr Guzman who reports that patient's FS have been erratic at [place], given lack of nursing staff to monitor FS regularly.  Unable to obtain a thorough history from patient due to poor historian secondary to dementia.  Patient denies any fever, chills, chest pain, SOB, abdominal pain, N/V, diarrhea.    In ED, FS 70 and repeat 68. Given glucose tab and 1 amp glucagon with improvement of sugar to 210.  Labs notable for mild ELISA. CT head with no ICH, 1.6 x 2.0 cm hypodensity in the right cerebellum; this may represent an age indeterminate infarct.  CT chest with small ground glass nodules. EKG nonischemic. Troponin negative. UA unremarkable.  Admitted to hospitalist for further workup

## 2019-02-26 NOTE — H&P ADULT - NSHPPHYSICALEXAM_GEN_ALL_CORE
T(C): 36.7 (02-26-19 @ 15:29), Max: 36.7 (02-26-19 @ 15:29)  HR: 83 (02-26-19 @ 15:29) (83 - 83)  BP: 126/74 (02-26-19 @ 15:29) (126/74 - 126/74)  RR: 16 (02-26-19 @ 15:29) (16 - 16)  SpO2: 95% (02-26-19 @ 15:29) (95% - 95%)  Wt(kg): --Vital Signs Last 24 Hrs  T(C): 36.7 (26 Feb 2019 15:29), Max: 36.7 (26 Feb 2019 15:29)  T(F): 98 (26 Feb 2019 15:29), Max: 98 (26 Feb 2019 15:29)  HR: 83 (26 Feb 2019 15:29) (83 - 83)  BP: 126/74 (26 Feb 2019 15:29) (126/74 - 126/74)  BP(mean): --  RR: 16 (26 Feb 2019 15:29) (16 - 16)  SpO2: 95% (26 Feb 2019 15:29) (95% - 95%)    PHYSICAL EXAM:  GENERAL: NAD, well-groomed, well-developed, talkative  HEAD:  Atraumatic, Normocephalic  EYES: EOMI, PERRLA, conjunctiva and sclera clear  ENMT: No tonsillar erythema, exudates, or enlargement; Moist mucous membranes, Good dentition, No lesions  NECK: Supple, No JVD, Normal thyroid  NERVOUS SYSTEM:  Alert & Oriented X1-name only- Good concentration; Motor Strength 5/5 B/L upper and lower extremities; DTRs 2+ intact and symmetric  CHEST/LUNG: Clear to percussion bilaterally;   HEART: Regular rate and rhythm; +murmur  ABDOMEN: Soft, Nontender, Nondistended; Bowel sounds present  EXTREMITIES:  2+ Peripheral Pulses, No clubbing, cyanosis, or edema  LYMPH: No lymphadenopathy noted  SKIN: No rashes or lesions

## 2019-02-27 LAB
ANION GAP SERPL CALC-SCNC: 8 MMOL/L — SIGNIFICANT CHANGE UP (ref 5–17)
BUN SERPL-MCNC: 26 MG/DL — HIGH (ref 7–23)
CALCIUM SERPL-MCNC: 9.8 MG/DL — SIGNIFICANT CHANGE UP (ref 8.4–10.5)
CHLORIDE SERPL-SCNC: 102 MMOL/L — SIGNIFICANT CHANGE UP (ref 96–108)
CO2 SERPL-SCNC: 28 MMOL/L — SIGNIFICANT CHANGE UP (ref 22–31)
CREAT SERPL-MCNC: 1.29 MG/DL — SIGNIFICANT CHANGE UP (ref 0.5–1.3)
CULTURE RESULTS: SIGNIFICANT CHANGE UP
GLUCOSE BLDC GLUCOMTR-MCNC: 128 MG/DL — HIGH (ref 70–99)
GLUCOSE BLDC GLUCOMTR-MCNC: 153 MG/DL — HIGH (ref 70–99)
GLUCOSE BLDC GLUCOMTR-MCNC: 231 MG/DL — HIGH (ref 70–99)
GLUCOSE BLDC GLUCOMTR-MCNC: 234 MG/DL — HIGH (ref 70–99)
GLUCOSE SERPL-MCNC: 93 MG/DL — SIGNIFICANT CHANGE UP (ref 70–99)
HBA1C BLD-MCNC: 8.1 % — HIGH (ref 4–5.6)
HCT VFR BLD CALC: 28.1 % — LOW (ref 34.5–45)
HGB BLD-MCNC: 8.6 G/DL — LOW (ref 11.5–15.5)
MCHC RBC-ENTMCNC: 29.9 PG — SIGNIFICANT CHANGE UP (ref 27–34)
MCHC RBC-ENTMCNC: 30.7 GM/DL — LOW (ref 32–36)
MCV RBC AUTO: 97.2 FL — SIGNIFICANT CHANGE UP (ref 80–100)
PLATELET # BLD AUTO: 214 K/UL — SIGNIFICANT CHANGE UP (ref 150–400)
POTASSIUM SERPL-MCNC: 3.7 MMOL/L — SIGNIFICANT CHANGE UP (ref 3.5–5.3)
POTASSIUM SERPL-SCNC: 3.7 MMOL/L — SIGNIFICANT CHANGE UP (ref 3.5–5.3)
RBC # BLD: 2.89 M/UL — LOW (ref 3.8–5.2)
RBC # FLD: 13.1 % — SIGNIFICANT CHANGE UP (ref 10.3–14.5)
SODIUM SERPL-SCNC: 138 MMOL/L — SIGNIFICANT CHANGE UP (ref 135–145)
SPECIMEN SOURCE: SIGNIFICANT CHANGE UP
TROPONIN I SERPL-MCNC: 0.03 NG/ML — SIGNIFICANT CHANGE UP (ref 0.02–0.06)
TROPONIN I SERPL-MCNC: <.017 NG/ML — LOW (ref 0.02–0.06)
WBC # BLD: 6.1 K/UL — SIGNIFICANT CHANGE UP (ref 3.8–10.5)
WBC # FLD AUTO: 6.1 K/UL — SIGNIFICANT CHANGE UP (ref 3.8–10.5)

## 2019-02-27 PROCEDURE — 93010 ELECTROCARDIOGRAM REPORT: CPT

## 2019-02-27 PROCEDURE — 93307 TTE W/O DOPPLER COMPLETE: CPT | Mod: 26

## 2019-02-27 PROCEDURE — 99233 SBSQ HOSP IP/OBS HIGH 50: CPT

## 2019-02-27 RX ORDER — DONEPEZIL HYDROCHLORIDE 10 MG/1
10 TABLET, FILM COATED ORAL AT BEDTIME
Qty: 0 | Refills: 0 | Status: DISCONTINUED | OUTPATIENT
Start: 2019-02-27 | End: 2019-02-28

## 2019-02-27 RX ORDER — RISPERIDONE 4 MG/1
0.25 TABLET ORAL ONCE
Qty: 0 | Refills: 0 | Status: COMPLETED | OUTPATIENT
Start: 2019-02-27 | End: 2019-02-27

## 2019-02-27 RX ORDER — LANOLIN ALCOHOL/MO/W.PET/CERES
3 CREAM (GRAM) TOPICAL AT BEDTIME
Qty: 0 | Refills: 0 | Status: DISCONTINUED | OUTPATIENT
Start: 2019-02-27 | End: 2019-02-28

## 2019-02-27 RX ADMIN — Medication 2: at 17:11

## 2019-02-27 RX ADMIN — DONEPEZIL HYDROCHLORIDE 10 MILLIGRAM(S): 10 TABLET, FILM COATED ORAL at 21:30

## 2019-02-27 RX ADMIN — RISPERIDONE 0.25 MILLIGRAM(S): 4 TABLET ORAL at 21:30

## 2019-02-27 RX ADMIN — HEPARIN SODIUM 5000 UNIT(S): 5000 INJECTION INTRAVENOUS; SUBCUTANEOUS at 17:11

## 2019-02-27 RX ADMIN — AMLODIPINE BESYLATE 10 MILLIGRAM(S): 2.5 TABLET ORAL at 05:08

## 2019-02-27 RX ADMIN — Medication 3 MILLIGRAM(S): at 21:30

## 2019-02-27 RX ADMIN — RISPERIDONE 0.25 MILLIGRAM(S): 4 TABLET ORAL at 19:00

## 2019-02-27 RX ADMIN — Medication 25 MILLIGRAM(S): at 21:30

## 2019-02-27 RX ADMIN — Medication 1: at 13:00

## 2019-02-27 RX ADMIN — LISINOPRIL 5 MILLIGRAM(S): 2.5 TABLET ORAL at 05:08

## 2019-02-27 RX ADMIN — DONEPEZIL HYDROCHLORIDE 10 MILLIGRAM(S): 10 TABLET, FILM COATED ORAL at 05:08

## 2019-02-27 RX ADMIN — PANTOPRAZOLE SODIUM 40 MILLIGRAM(S): 20 TABLET, DELAYED RELEASE ORAL at 05:08

## 2019-02-27 RX ADMIN — RISPERIDONE 0.25 MILLIGRAM(S): 4 TABLET ORAL at 05:08

## 2019-02-27 RX ADMIN — RISPERIDONE 0.25 MILLIGRAM(S): 4 TABLET ORAL at 17:11

## 2019-02-27 RX ADMIN — HEPARIN SODIUM 5000 UNIT(S): 5000 INJECTION INTRAVENOUS; SUBCUTANEOUS at 05:08

## 2019-02-27 NOTE — PROGRESS NOTE ADULT - SUBJECTIVE AND OBJECTIVE BOX
Patient is a 83y old  Female who presents with a chief complaint of Syncope (2019 18:47)    Feels better. Tired. Son at bedside.      Patient seen and examined at bedside.    ALLERGIES:  lovastatin (Unknown)  penicillin (Unknown)  simvastatin (Unknown)  statins (Unknown)    MEDICATIONS  (STANDING):  amLODIPine   Tablet 10 milliGRAM(s) Oral daily  dextrose 5%. 1000 milliLiter(s) (50 mL/Hr) IV Continuous <Continuous>  dextrose 50% Injectable 12.5 Gram(s) IV Push once  dextrose 50% Injectable 25 Gram(s) IV Push once  dextrose 50% Injectable 25 Gram(s) IV Push once  donepezil 10 milliGRAM(s) Oral at bedtime  heparin  Injectable 5000 Unit(s) SubCutaneous every 12 hours  insulin lispro (HumaLOG) corrective regimen sliding scale   SubCutaneous three times a day before meals  lisinopril 5 milliGRAM(s) Oral daily  pantoprazole    Tablet 40 milliGRAM(s) Oral before breakfast  risperiDONE   Tablet 0.25 milliGRAM(s) Oral two times a day  risperiDONE   Tablet 0.25 milliGRAM(s) Oral at bedtime  traZODone 25 milliGRAM(s) Oral at bedtime    MEDICATIONS  (PRN):  dextrose 40% Gel 15 Gram(s) Oral once PRN Blood Glucose LESS THAN 70 milliGRAM(s)/deciliter  glucagon  Injectable 1 milliGRAM(s) IntraMuscular once PRN Glucose LESS THAN 70 milligrams/deciliter  melatonin 3 milliGRAM(s) Oral at bedtime PRN Sleep    Vital Signs Last 24 Hrs  T(F): 98.5 (2019 15:22), Max: 98.5 (2019 15:22)  HR: 89 (2019 15:22) (67 - 94)  BP: 138/73 (2019 15:22) (107/51 - 138/75)  RR: 15 (2019 15:22) (15 - 15)  SpO2: 100% (2019 15:22) (97% - 100%)  I&O's Summary    2019 07:01  -  2019 16:05  --------------------------------------------------------  IN: 0 mL / OUT: 0 mL / NET: 0 mL    BMI (kg/m2): 20.9 (19 @ 19:49)  PHYSICAL EXAM:  General: NAD, A/O x 1  ENT: MMM  Neck: Supple, No JVD  Lungs: Clear to auscultation bilaterally  Cardio: RRR, S1/S2, No murmurs  Abdomen: Soft, Nontender, Nondistended; Bowel sounds present  Extremities: No calf tenderness, No pitting edema    LABS:                        8.6    6.1   )-----------( 214      ( 2019 05:35 )             28.1           138  |  102  |  26  ----------------------------<  93  3.7   |  28  |  1.29    Ca    9.8      2019 05:35    TPro  7.9  /  Alb  3.1  /  TBili  0.3  /  DBili  x   /  AST  23  /  ALT  10  /  AlkPhos  87       eGFR if Non African American: 38 mL/min/1.73M2 (19 @ 05:35)  eGFR if African American: 44 mL/min/1.73M2 (19 @ 05:35)    PT/INR - ( 2019 15:55 )   PT: 10.8 sec;   INR: 0.97 ratio         PTT - ( 2019 15:55 )  PTT:34.0 sec     CARDIAC MARKERS ( 2019 03:30 )  <.017 ng/mL / x     / x     / x     / x      CARDIAC MARKERS ( 2019 00:20 )  .028 ng/mL / x     / x     / x     / x      CARDIAC MARKERS ( 2019 22:15 )  .023 ng/mL / x     / x     / x     / x      CARDIAC MARKERS ( 2019 15:55 )  <.017 ng/mL / x     / x     / x     / x        CAPILLARY BLOOD GLUCOSE    POCT Blood Glucose.: 153 mg/dL (2019 12:50)  POCT Blood Glucose.: 128 mg/dL (2019 07:24)  POCT Blood Glucose.: 162 mg/dL (2019 22:43)  POCT Blood Glucose.: 63 mg/dL (2019 22:21)  POCT Blood Glucose.: 123 mg/dL (2019 19:30)  POCT Blood Glucose.: 203 mg/dL (2019 17:53)  POCT Blood Glucose.: 210 mg/dL (2019 17:51)  POCT Blood Glucose.: 71 mg/dL (2019 17:13)  POCT Blood Glucose.: 66 mg/dL (2019 17:12)     [id number] 8.1    Urinalysis Basic - ( 2019 17:25 )    Color: Yellow / Appearance: Clear / S.010 / pH: x  Gluc: x / Ketone: Negative  / Bili: Negative / Urobili: Negative   Blood: x / Protein: 30 mg/dL / Nitrite: Negative   Leuk Esterase: Small / RBC: 0-4 /HPF / WBC 6-10 /HPF   Sq Epi: x / Non Sq Epi: Neg.-Few / Bacteria: Few /HPF          RADIOLOGY & ADDITIONAL TESTS:    Care Discussed with Consultants/Other Providers:

## 2019-02-28 ENCOUNTER — TRANSCRIPTION ENCOUNTER (OUTPATIENT)
Age: 84
End: 2019-02-28

## 2019-02-28 VITALS
SYSTOLIC BLOOD PRESSURE: 146 MMHG | TEMPERATURE: 98 F | OXYGEN SATURATION: 100 % | HEART RATE: 75 BPM | RESPIRATION RATE: 16 BRPM | DIASTOLIC BLOOD PRESSURE: 93 MMHG

## 2019-02-28 LAB
ANION GAP SERPL CALC-SCNC: 11 MMOL/L — SIGNIFICANT CHANGE UP (ref 5–17)
BUN SERPL-MCNC: 30 MG/DL — HIGH (ref 7–23)
CALCIUM SERPL-MCNC: 9.2 MG/DL — SIGNIFICANT CHANGE UP (ref 8.4–10.5)
CHLORIDE SERPL-SCNC: 101 MMOL/L — SIGNIFICANT CHANGE UP (ref 96–108)
CO2 SERPL-SCNC: 25 MMOL/L — SIGNIFICANT CHANGE UP (ref 22–31)
CREAT SERPL-MCNC: 1.46 MG/DL — HIGH (ref 0.5–1.3)
GLUCOSE BLDC GLUCOMTR-MCNC: 188 MG/DL — HIGH (ref 70–99)
GLUCOSE BLDC GLUCOMTR-MCNC: 212 MG/DL — HIGH (ref 70–99)
GLUCOSE BLDC GLUCOMTR-MCNC: 356 MG/DL — HIGH (ref 70–99)
GLUCOSE SERPL-MCNC: 231 MG/DL — HIGH (ref 70–99)
HCT VFR BLD CALC: 26.9 % — LOW (ref 34.5–45)
HGB BLD-MCNC: 8 G/DL — LOW (ref 11.5–15.5)
MCHC RBC-ENTMCNC: 28.6 PG — SIGNIFICANT CHANGE UP (ref 27–34)
MCHC RBC-ENTMCNC: 29.8 GM/DL — LOW (ref 32–36)
MCV RBC AUTO: 95.9 FL — SIGNIFICANT CHANGE UP (ref 80–100)
PLATELET # BLD AUTO: 199 K/UL — SIGNIFICANT CHANGE UP (ref 150–400)
POTASSIUM SERPL-MCNC: 4 MMOL/L — SIGNIFICANT CHANGE UP (ref 3.5–5.3)
POTASSIUM SERPL-SCNC: 4 MMOL/L — SIGNIFICANT CHANGE UP (ref 3.5–5.3)
RBC # BLD: 2.8 M/UL — LOW (ref 3.8–5.2)
RBC # FLD: 13 % — SIGNIFICANT CHANGE UP (ref 10.3–14.5)
SODIUM SERPL-SCNC: 137 MMOL/L — SIGNIFICANT CHANGE UP (ref 135–145)
WBC # BLD: 6.7 K/UL — SIGNIFICANT CHANGE UP (ref 3.8–10.5)
WBC # FLD AUTO: 6.7 K/UL — SIGNIFICANT CHANGE UP (ref 3.8–10.5)

## 2019-02-28 PROCEDURE — 93307 TTE W/O DOPPLER COMPLETE: CPT

## 2019-02-28 PROCEDURE — 70450 CT HEAD/BRAIN W/O DYE: CPT

## 2019-02-28 PROCEDURE — 36000 PLACE NEEDLE IN VEIN: CPT

## 2019-02-28 PROCEDURE — 97162 PT EVAL MOD COMPLEX 30 MIN: CPT

## 2019-02-28 PROCEDURE — 36415 COLL VENOUS BLD VENIPUNCTURE: CPT

## 2019-02-28 PROCEDURE — 71250 CT THORAX DX C-: CPT

## 2019-02-28 PROCEDURE — 84484 ASSAY OF TROPONIN QUANT: CPT

## 2019-02-28 PROCEDURE — 80048 BASIC METABOLIC PNL TOTAL CA: CPT

## 2019-02-28 PROCEDURE — 85610 PROTHROMBIN TIME: CPT

## 2019-02-28 PROCEDURE — 85730 THROMBOPLASTIN TIME PARTIAL: CPT

## 2019-02-28 PROCEDURE — 93005 ELECTROCARDIOGRAM TRACING: CPT

## 2019-02-28 PROCEDURE — 71045 X-RAY EXAM CHEST 1 VIEW: CPT

## 2019-02-28 PROCEDURE — 85027 COMPLETE CBC AUTOMATED: CPT

## 2019-02-28 PROCEDURE — 87086 URINE CULTURE/COLONY COUNT: CPT

## 2019-02-28 PROCEDURE — 99239 HOSP IP/OBS DSCHRG MGMT >30: CPT

## 2019-02-28 PROCEDURE — 99285 EMERGENCY DEPT VISIT HI MDM: CPT | Mod: 25

## 2019-02-28 PROCEDURE — G0378: CPT

## 2019-02-28 PROCEDURE — 82962 GLUCOSE BLOOD TEST: CPT

## 2019-02-28 PROCEDURE — 81001 URINALYSIS AUTO W/SCOPE: CPT

## 2019-02-28 PROCEDURE — 80053 COMPREHEN METABOLIC PANEL: CPT

## 2019-02-28 PROCEDURE — 83036 HEMOGLOBIN GLYCOSYLATED A1C: CPT

## 2019-02-28 RX ORDER — SITAGLIPTIN 50 MG/1
1 TABLET, FILM COATED ORAL
Qty: 30 | Refills: 0 | OUTPATIENT
Start: 2019-02-28 | End: 2019-03-29

## 2019-02-28 RX ORDER — INSULIN GLARGINE 100 [IU]/ML
12 INJECTION, SOLUTION SUBCUTANEOUS
Qty: 0 | Refills: 0 | COMMUNITY

## 2019-02-28 RX ORDER — METFORMIN HYDROCHLORIDE 850 MG/1
1 TABLET ORAL
Qty: 0 | Refills: 0 | COMMUNITY

## 2019-02-28 RX ORDER — METFORMIN HYDROCHLORIDE 850 MG/1
2 TABLET ORAL
Qty: 120 | Refills: 0 | OUTPATIENT
Start: 2019-02-28 | End: 2019-03-29

## 2019-02-28 RX ORDER — INSULIN ASPART 100 [IU]/ML
14 INJECTION, SOLUTION SUBCUTANEOUS
Qty: 0 | Refills: 0 | COMMUNITY

## 2019-02-28 RX ADMIN — AMLODIPINE BESYLATE 10 MILLIGRAM(S): 2.5 TABLET ORAL at 05:17

## 2019-02-28 RX ADMIN — RISPERIDONE 0.25 MILLIGRAM(S): 4 TABLET ORAL at 05:17

## 2019-02-28 RX ADMIN — HEPARIN SODIUM 5000 UNIT(S): 5000 INJECTION INTRAVENOUS; SUBCUTANEOUS at 16:58

## 2019-02-28 RX ADMIN — RISPERIDONE 0.25 MILLIGRAM(S): 4 TABLET ORAL at 16:59

## 2019-02-28 RX ADMIN — PANTOPRAZOLE SODIUM 40 MILLIGRAM(S): 20 TABLET, DELAYED RELEASE ORAL at 05:17

## 2019-02-28 RX ADMIN — Medication 1: at 11:26

## 2019-02-28 RX ADMIN — Medication 5: at 16:59

## 2019-02-28 RX ADMIN — Medication 2: at 08:31

## 2019-02-28 RX ADMIN — LISINOPRIL 5 MILLIGRAM(S): 2.5 TABLET ORAL at 05:17

## 2019-02-28 RX ADMIN — HEPARIN SODIUM 5000 UNIT(S): 5000 INJECTION INTRAVENOUS; SUBCUTANEOUS at 05:17

## 2019-02-28 NOTE — DISCHARGE NOTE ADULT - CARE PROVIDER_API CALL
Walter Guzman (MD)  Family Medicine Medicine  [address] [place] [address]  Phone: [phone]  Fax: [phone]  Follow Up Time:

## 2019-02-28 NOTE — DISCHARGE NOTE ADULT - PLAN OF CARE
Prevent hypoglycemic events Increase metformin to 1000 mg twice a day  stop insulin Continue metformin 1000 mg BID and start januvia 100 mg daily   STOP INSULIN

## 2019-02-28 NOTE — DISCHARGE NOTE ADULT - CARE PLAN
Principal Discharge DX:	Hypoglycemia  Goal:	Prevent hypoglycemic events  Assessment and plan of treatment:	Increase metformin to 1000 mg twice a day  stop insulin  Secondary Diagnosis:	ELISA (acute kidney injury)  Secondary Diagnosis:	Hypertension, unspecified type  Secondary Diagnosis:	Dementia without behavioral disturbance, unspecified dementia type Principal Discharge DX:	Hypoglycemia  Goal:	Prevent hypoglycemic events  Assessment and plan of treatment:	Continue metformin 1000 mg BID and start januvia 100 mg daily   STOP INSULIN  Secondary Diagnosis:	ELISA (acute kidney injury)  Secondary Diagnosis:	Hypertension, unspecified type  Secondary Diagnosis:	Dementia without behavioral disturbance, unspecified dementia type

## 2019-02-28 NOTE — DISCHARGE NOTE ADULT - MEDICATION SUMMARY - MEDICATIONS TO TAKE
I will START or STAY ON the medications listed below when I get home from the hospital:    ramipril 5 mg oral capsule  -- 1 cap(s) by mouth once a day  -- Indication: For Hypertension, unspecified type    traZODone 50 mg oral tablet  -- 25 milligram(s) by mouth once a day (at bedtime)  -- Indication: For Sleep    metFORMIN 500 mg oral tablet  -- 2 tab(s) by mouth 2 times a day   -- Indication: For Diabetes type 2    RisperDAL 0.25 mg oral tablet  -- 1/2 tab by mouth twice a day (0.5mg) and 1 tablet by mouth at bedtime.   -- Indication: For Dementia    risperiDONE 0.25 mg oral tablet  -- 1 tab(s) by mouth 2 times a day  -- Indication: For Dementia    amLODIPine 10 mg oral tablet  -- 1 tab(s) by mouth once a day  -- Indication: For Hypertension, unspecified type    omeprazole 40 mg oral delayed release capsule  -- 1 cap(s) by mouth once a day  -- Indication: For GERD I will START or STAY ON the medications listed below when I get home from the hospital:    ramipril 5 mg oral capsule  -- 1 cap(s) by mouth once a day  -- Indication: For Hypertension, unspecified type    traZODone 50 mg oral tablet  -- 25 milligram(s) by mouth once a day (at bedtime)  -- Indication: For Sleep    metFORMIN 500 mg oral tablet  -- 2 tab(s) by mouth 2 times a day   -- Indication: For Diabetes type 2    Januvia 100 mg oral tablet  -- 1 tab(s) by mouth once a day   -- Do not drink alcoholic beverages when taking this medication.    -- Indication: For Diabetes type 2    RisperDAL 0.25 mg oral tablet  -- 1/2 tab by mouth twice a day (0.5mg) and 1 tablet by mouth at bedtime.   -- Indication: For Dementia    risperiDONE 0.25 mg oral tablet  -- 1 tab(s) by mouth 2 times a day  -- Indication: For Dementia    amLODIPine 10 mg oral tablet  -- 1 tab(s) by mouth once a day  -- Indication: For Hypertension, unspecified type    omeprazole 40 mg oral delayed release capsule  -- 1 cap(s) by mouth once a day  -- Indication: For GERD

## 2019-02-28 NOTE — PROGRESS NOTE ADULT - PROBLEM SELECTOR PLAN 5
at baseline
Spoke with daughter in law who reports pt has a long standing history of dementia  Not interested in pursing MRI brain

## 2019-02-28 NOTE — PROGRESS NOTE ADULT - PROBLEM SELECTOR PLAN 1
suspected secondary to hypoglycemia  watch glucose fingersticks  likely need to decrease regimen to prevent hypoglycemic events
suspected secondary to hypoglycemia  watch glucose fingersticks  likely need to decrease regimen to prevent hypoglycemic events

## 2019-02-28 NOTE — PROGRESS NOTE ADULT - SUBJECTIVE AND OBJECTIVE BOX
Patient is a 83y old  Female who presents with a chief complaint of Syncope (2019 16:05)    Feels well.  Denies chest pain, sob, nausea, vomiting.      Patient seen and examined at bedside.    ALLERGIES:  lovastatin (Unknown)  penicillin (Unknown)  simvastatin (Unknown)  statins (Unknown)    MEDICATIONS  (STANDING):  amLODIPine   Tablet 10 milliGRAM(s) Oral daily  dextrose 5%. 1000 milliLiter(s) (50 mL/Hr) IV Continuous <Continuous>  dextrose 50% Injectable 12.5 Gram(s) IV Push once  dextrose 50% Injectable 25 Gram(s) IV Push once  dextrose 50% Injectable 25 Gram(s) IV Push once  donepezil 10 milliGRAM(s) Oral at bedtime  heparin  Injectable 5000 Unit(s) SubCutaneous every 12 hours  insulin lispro (HumaLOG) corrective regimen sliding scale   SubCutaneous three times a day before meals  lisinopril 5 milliGRAM(s) Oral daily  pantoprazole    Tablet 40 milliGRAM(s) Oral before breakfast  risperiDONE   Tablet 0.25 milliGRAM(s) Oral two times a day  risperiDONE   Tablet 0.25 milliGRAM(s) Oral at bedtime  traZODone 25 milliGRAM(s) Oral at bedtime    MEDICATIONS  (PRN):  dextrose 40% Gel 15 Gram(s) Oral once PRN Blood Glucose LESS THAN 70 milliGRAM(s)/deciliter  glucagon  Injectable 1 milliGRAM(s) IntraMuscular once PRN Glucose LESS THAN 70 milligrams/deciliter  melatonin 3 milliGRAM(s) Oral at bedtime PRN Sleep    Vital Signs Last 24 Hrs  T(F): 98.4 (2019 06:52), Max: 99.4 (2019 20:02)  HR: 68 (2019 06:52) (68 - 94)  BP: 113/60 (2019 06:52) (105/63 - 138/73)  RR: 15 (2019 06:52) (15 - 15)  SpO2: 98% (2019 06:52) (95% - 100%)  I&O's Summary    2019 07:01  -  2019 07:00  --------------------------------------------------------  IN: 200 mL / OUT: 0 mL / NET: 200 mL    BMI (kg/m2): 20.9 (19 @ 19:49)  PHYSICAL EXAM:  General: NAD, A/O x 1  ENT: MMM  Neck: Supple, No JVD  Lungs: Clear to auscultation bilaterally  Cardio: RRR, S1/S2, No murmurs  Abdomen: Soft, Nontender, Nondistended; Bowel sounds present  Extremities: No calf tenderness, No pitting edema    LABS:             8.6    6.1   )-----------( 214      ( 2019 05:35 )             28.1           138  |  102  |  26  ----------------------------<  93  3.7   |  28  |  1.29    Ca    9.8      2019 05:35    TPro  7.9  /  Alb  3.1  /  TBili  0.3  /  DBili  x   /  AST  23  /  ALT  10  /  AlkPhos  87       eGFR if Non African American: 38 mL/min/1.73M2 (19 @ 05:35)  eGFR if African American: 44 mL/min/1.73M2 (19 @ 05:35)    PT/INR - ( 2019 15:55 )   PT: 10.8 sec;   INR: 0.97 ratio       PTT - ( 2019 15:55 )  PTT:34.0 sec     CARDIAC MARKERS ( 2019 03:30 )  <.017 ng/mL / x     / x     / x     / x      CARDIAC MARKERS ( 2019 00:20 )  .028 ng/mL / x     / x     / x     / x      CARDIAC MARKERS ( 2019 22:15 )  .023 ng/mL / x     / x     / x     / x      CARDIAC MARKERS ( 2019 15:55 )  <.017 ng/mL / x     / x     / x     / x        CAPILLARY BLOOD GLUCOSE    POCT Blood Glucose.: 234 mg/dL (2019 21:25)  POCT Blood Glucose.: 231 mg/dL (2019 16:57)  POCT Blood Glucose.: 153 mg/dL (2019 12:50)  POCT Blood Glucose.: 128 mg/dL (2019 07:24)    02- [id number] 8.1    Urinalysis Basic - ( 2019 17:25 )    Color: Yellow / Appearance: Clear / S.010 / pH: x  Gluc: x / Ketone: Negative  / Bili: Negative / Urobili: Negative   Blood: x / Protein: 30 mg/dL / Nitrite: Negative   Leuk Esterase: Small / RBC: 0-4 /HPF / WBC 6-10 /HPF   Sq Epi: x / Non Sq Epi: Neg.-Few / Bacteria: Few /HPF    Culture - Urine (collected 2019 17:25)  Source: .Urine Catheterized  Final Report (2019 16:32):    <10,000 CFU/ml    Normal Urogenital patience present    RADIOLOGY & ADDITIONAL TESTS:    Care Discussed with Consultants/Other Providers:

## 2019-02-28 NOTE — PROGRESS NOTE ADULT - PROBLEM SELECTOR PLAN 3
A1C 8.1  reasonably well controlled given patient's underlying dementia
A1C 8.1  reasonably well controlled given patient's underlying dementia  consider changing to oral diabetes management?

## 2019-02-28 NOTE — CDI QUERY NOTE - NSCDIOTHERTXTBX_GEN_ALL_CORE_HH
H/O CKD 3    Notes - ELISA on CKD    Creatinine levels - 1.49 - 1.29 - 1.46       Please clarify;    ELISA ruled in – please document clinical data to support ELISA.    ELISA ruled out.      Thank you

## 2019-02-28 NOTE — CDI QUERY NOTE - NSCDINOTEPOA_GEN_ALL_CORE_FT
Was the condition present on admission, if so, please document in the chart that “(the condition) was present on admission.” None

## 2019-02-28 NOTE — DISCHARGE NOTE ADULT - MEDICATION SUMMARY - MEDICATIONS TO STOP TAKING
I will STOP taking the medications listed below when I get home from the hospital:    Basaglar KwikPen 100 units/mL subcutaneous solution  -- 12 unit(s) subcutaneous once a day for on month    NovoLOG FlexPen 100 units/mL injectable solution  -- 14 unit(s) injectable once a day

## 2019-02-28 NOTE — DISCHARGE NOTE ADULT - SECONDARY DIAGNOSIS.
ELISA (acute kidney injury) Hypertension, unspecified type Dementia without behavioral disturbance, unspecified dementia type

## 2019-02-28 NOTE — DISCHARGE NOTE ADULT - PATIENT PORTAL LINK FT
You can access the [facility] Patient Portal, offered by [facility], by registering with the following website: [email or web address]

## 2019-02-28 NOTE — PROGRESS NOTE ADULT - ASSESSMENT
82yo F from [facility] assisted [facility] facility with PMHx of CKD, HTN, DM (poorly controlled), dementia, colon cancer p/w  with  near syncope most likely secondary to hypoglycemia.  Unable to obtain a thorough history from patient due to poor historian secondary to dementia.      Discussed with son at bedside.    will watch sugars overnight to ensure no further episodes    likely discharge back to [place] tomorrow
84yo F from [facility] living [facility] with PMHx of CKD, HTN, DM (poorly controlled), dementia, colon cancer p/w  with  near syncope most likely secondary to hypoglycemia.  Unable to obtain a thorough history from patient due to poor historian secondary to dementia.      discharge to home     d/c insulin regimen  will d/c on metformin as this will have less risk of hypoglycemic events

## 2019-02-28 NOTE — DISCHARGE NOTE ADULT - HOSPITAL COURSE
83W PMH diabetes, hypertension, dementia, GERD presents for syncope.  Patient's sugar was low and given glucagon in the ED with improvement in sugars to 200s.  Patient's glucose fingersticks were monitored in hospital with decrease in insulin regimen.       Patient's insulin was stopped and metformin increased to 1000 mg BID from 500 mg BID.    Patient discharged to home. 83W PMH diabetes, hypertension, dementia, GERD presents for syncope.  Patient's sugar was low and given glucagon in the ED with improvement in sugars to 200s.  Patient's glucose fingersticks were monitored in hospital with decrease in insulin regimen.       Patient's insulin was stopped and metformin increased to 1000 mg BID from 500 mg BID.    Patient discharged to home.      Discussed with Dr. Guzman

## 2019-02-28 NOTE — PROGRESS NOTE ADULT - PROBLEM SELECTOR PLAN 2
consider decreasing regimen to prevent hypoglycemic event
no hypoglycemic events overnight  will likely discharge on oral regimen?

## 2019-03-10 ENCOUNTER — EMERGENCY (EMERGENCY)
Facility: HOSPITAL | Age: 84
LOS: 1 days | Discharge: HOME | End: 2019-03-10
Attending: EMERGENCY MEDICINE | Admitting: EMERGENCY MEDICINE
Payer: COMMERCIAL

## 2019-03-10 VITALS
HEART RATE: 90 BPM | SYSTOLIC BLOOD PRESSURE: 106 MMHG | DIASTOLIC BLOOD PRESSURE: 49 MMHG | RESPIRATION RATE: 18 BRPM | OXYGEN SATURATION: 100 %

## 2019-03-10 VITALS
SYSTOLIC BLOOD PRESSURE: 138 MMHG | TEMPERATURE: 98 F | HEART RATE: 84 BPM | WEIGHT: 117.95 LBS | DIASTOLIC BLOOD PRESSURE: 67 MMHG | OXYGEN SATURATION: 100 % | RESPIRATION RATE: 18 BRPM

## 2019-03-10 DIAGNOSIS — Z90.49 ACQUIRED ABSENCE OF OTHER SPECIFIED PARTS OF DIGESTIVE TRACT: Chronic | ICD-10-CM

## 2019-03-10 DIAGNOSIS — R73.9 HYPERGLYCEMIA, UNSPECIFIED: ICD-10-CM

## 2019-03-10 LAB — GLUCOSE BLDC GLUCOMTR-MCNC: 305 MG/DL — HIGH (ref 70–99)

## 2019-03-10 PROCEDURE — 99283 EMERGENCY DEPT VISIT LOW MDM: CPT

## 2019-03-10 PROCEDURE — 99282 EMERGENCY DEPT VISIT SF MDM: CPT

## 2019-03-10 PROCEDURE — 82962 GLUCOSE BLOOD TEST: CPT

## 2019-03-10 RX ORDER — RISPERIDONE 4 MG/1
1 TABLET ORAL
Qty: 0 | Refills: 0 | COMMUNITY

## 2019-03-10 RX ORDER — TRAZODONE HCL 50 MG
25 TABLET ORAL
Qty: 0 | Refills: 0 | COMMUNITY

## 2019-03-10 NOTE — ED PROVIDER NOTE - CLINICAL SUMMARY MEDICAL DECISION MAKING FREE TEXT BOX
pt with mild elevation in blood sugar without any symptoms, pt stable for return back to SNF for continued routine glycemic control

## 2019-03-10 NOTE — ED PROVIDER NOTE - PROGRESS NOTE DETAILS
multiple attempts were made by me and the unit clerk to contact [place].  I left 1 msg,  left 2 msgs.  Phone tree does not allow access to live person.

## 2019-03-10 NOTE — ED ADULT NURSE NOTE - OBJECTIVE STATEMENT
Pt arrives to ER brought in by EMS for elevated blood sugar. As per EMS, staff at [place] on the [facility] pt had blood sugar in the 400's so they gave her 10 units of novolog. Upon EMS arrival, pts blood sugar was 339. Staff at [facility] states they have no nurse on staff tonight so they had to send her to ER for evaluation. Pt arrives to ER denying any pain or discomfort, negative symptoms on arrival. Pt alert and disoriented at baseline, neg visible trauma, denies chest pain, denies sob, neg fever/chills.

## 2019-03-10 NOTE — ED ADULT NURSE NOTE - NSSISCREENINGQ2_ED_A_ED
-Ammonia level at 59  -This appears to be chronic as iot has been elevated over past 8 months  -Probably secondary to chronic liver disease  -Continue lactulose No

## 2019-03-10 NOTE — ED PROVIDER NOTE - NSFOLLOWUPINSTRUCTIONS_ED_ALL_ED_FT
-- Continue all previously prescribed medications as directed unless otherwise instructed.    -- Follow up with your primary care physician in 48-72 hours- bring copies of your results.    -- Return to the ER for worsening or persistent symptoms, and/or ANY NEW OR CONCERNING SYMPTOMS.    -- If you have issues obtaining follow up, please call: [phone] to obtain a [facility] doctor or specialist who takes your insurance in your area.

## 2019-03-10 NOTE — ED ADULT NURSE NOTE - NSIMPLEMENTINTERV_GEN_ALL_ED
Implemented All Fall Risk Interventions:  [place] to call system. Call bell, personal items and telephone within reach. Instruct patient to call for assistance. Room bathroom lighting operational. Non-slip footwear when patient is off stretcher. Physically safe environment: no spills, clutter or unnecessary equipment. Stretcher in lowest position, wheels locked, appropriate side rails in place. Provide visual cue, wrist band, yellow gown, etc. Monitor gait and stability. Monitor for mental status changes and reorient to person, place, and time. Review medications for side effects contributing to fall risk. Reinforce activity limits and safety measures with patient and family.

## 2019-03-10 NOTE — ED PROVIDER NOTE - OBJECTIVE STATEMENT
as per SNF notes and as per EMS pt sent to ER for an elevated blood sugar of about 400 at facility, blood sugar has been elevated over the last couple of days intermittently.  no other symptoms were noted by SNF.

## 2019-03-10 NOTE — ED PROVIDER NOTE - PHYSICAL EXAMINATION
Gen:  alert, awake, no acute distress  HEENT:  atraumatic head, airway clear, pupils equal and round  CV:  rrr, nl S1, S2, no m/r/g  Pulm:  lungs CTA b/l  Abd: s/nt/nd, +BS  Ext:  moving all extremities  Neuro:  grossly intact, no focal deficits  Skin:  clear, dry, intact

## 2019-03-20 ENCOUNTER — EMERGENCY (EMERGENCY)
Facility: HOSPITAL | Age: 84
LOS: 1 days | Discharge: HOME | End: 2019-03-20
Attending: EMERGENCY MEDICINE | Admitting: EMERGENCY MEDICINE
Payer: COMMERCIAL

## 2019-03-20 VITALS
HEART RATE: 73 BPM | SYSTOLIC BLOOD PRESSURE: 154 MMHG | OXYGEN SATURATION: 95 % | TEMPERATURE: 98 F | DIASTOLIC BLOOD PRESSURE: 82 MMHG | RESPIRATION RATE: 16 BRPM | WEIGHT: 100.09 LBS

## 2019-03-20 DIAGNOSIS — Z90.49 ACQUIRED ABSENCE OF OTHER SPECIFIED PARTS OF DIGESTIVE TRACT: Chronic | ICD-10-CM

## 2019-03-20 LAB
ANION GAP SERPL CALC-SCNC: 13 MMOL/L — SIGNIFICANT CHANGE UP (ref 5–17)
BASOPHILS # BLD AUTO: 0.1 K/UL — SIGNIFICANT CHANGE UP (ref 0–0.2)
BASOPHILS NFR BLD AUTO: 1.4 % — SIGNIFICANT CHANGE UP (ref 0–2)
BUN SERPL-MCNC: 46 MG/DL — HIGH (ref 7–23)
CALCIUM SERPL-MCNC: 10.3 MG/DL — SIGNIFICANT CHANGE UP (ref 8.4–10.5)
CHLORIDE SERPL-SCNC: 101 MMOL/L — SIGNIFICANT CHANGE UP (ref 96–108)
CO2 SERPL-SCNC: 23 MMOL/L — SIGNIFICANT CHANGE UP (ref 22–31)
CREAT SERPL-MCNC: 1.74 MG/DL — HIGH (ref 0.5–1.3)
EOSINOPHIL # BLD AUTO: 0.1 K/UL — SIGNIFICANT CHANGE UP (ref 0–0.5)
EOSINOPHIL NFR BLD AUTO: 1.3 % — SIGNIFICANT CHANGE UP (ref 0–6)
GLUCOSE BLDC GLUCOMTR-MCNC: 216 MG/DL — HIGH (ref 70–99)
GLUCOSE SERPL-MCNC: 222 MG/DL — HIGH (ref 70–99)
HCT VFR BLD CALC: 26.4 % — LOW (ref 34.5–45)
HGB BLD-MCNC: 8.3 G/DL — LOW (ref 11.5–15.5)
LYMPHOCYTES # BLD AUTO: 1 K/UL — SIGNIFICANT CHANGE UP (ref 1–3.3)
LYMPHOCYTES # BLD AUTO: 12.8 % — LOW (ref 13–44)
MCHC RBC-ENTMCNC: 29.1 PG — SIGNIFICANT CHANGE UP (ref 27–34)
MCHC RBC-ENTMCNC: 31.4 GM/DL — LOW (ref 32–36)
MCV RBC AUTO: 92.7 FL — SIGNIFICANT CHANGE UP (ref 80–100)
MONOCYTES # BLD AUTO: 0.5 K/UL — SIGNIFICANT CHANGE UP (ref 0–0.9)
MONOCYTES NFR BLD AUTO: 6.3 % — SIGNIFICANT CHANGE UP (ref 2–14)
NEUTROPHILS # BLD AUTO: 6 K/UL — SIGNIFICANT CHANGE UP (ref 1.8–7.4)
NEUTROPHILS NFR BLD AUTO: 78.2 % — HIGH (ref 43–77)
PLATELET # BLD AUTO: 270 K/UL — SIGNIFICANT CHANGE UP (ref 150–400)
POTASSIUM SERPL-MCNC: 5.8 MMOL/L — HIGH (ref 3.5–5.3)
POTASSIUM SERPL-SCNC: 5.8 MMOL/L — HIGH (ref 3.5–5.3)
RBC # BLD: 2.85 M/UL — LOW (ref 3.8–5.2)
RBC # FLD: 13.5 % — SIGNIFICANT CHANGE UP (ref 10.3–14.5)
SODIUM SERPL-SCNC: 137 MMOL/L — SIGNIFICANT CHANGE UP (ref 135–145)
WBC # BLD: 7.7 K/UL — SIGNIFICANT CHANGE UP (ref 3.8–10.5)
WBC # FLD AUTO: 7.7 K/UL — SIGNIFICANT CHANGE UP (ref 3.8–10.5)

## 2019-03-20 PROCEDURE — 99284 EMERGENCY DEPT VISIT MOD MDM: CPT

## 2019-03-20 PROCEDURE — 85027 COMPLETE CBC AUTOMATED: CPT

## 2019-03-20 PROCEDURE — 99283 EMERGENCY DEPT VISIT LOW MDM: CPT

## 2019-03-20 PROCEDURE — 36415 COLL VENOUS BLD VENIPUNCTURE: CPT

## 2019-03-20 PROCEDURE — 82962 GLUCOSE BLOOD TEST: CPT

## 2019-03-20 PROCEDURE — 80048 BASIC METABOLIC PNL TOTAL CA: CPT

## 2019-03-20 RX ORDER — AMLODIPINE BESYLATE 2.5 MG/1
1 TABLET ORAL
Qty: 0 | Refills: 0 | COMMUNITY

## 2019-03-20 RX ORDER — INSULIN ASPART 100 [IU]/ML
10 INJECTION, SUSPENSION SUBCUTANEOUS
Qty: 0 | Refills: 0 | COMMUNITY

## 2019-03-20 RX ORDER — INSULIN GLARGINE 100 [IU]/ML
10 INJECTION, SOLUTION SUBCUTANEOUS
Qty: 0 | Refills: 0 | COMMUNITY

## 2019-03-20 RX ORDER — OMEPRAZOLE 10 MG/1
1 CAPSULE, DELAYED RELEASE ORAL
Qty: 0 | Refills: 0 | COMMUNITY

## 2019-03-20 RX ORDER — SODIUM CHLORIDE 9 MG/ML
500 INJECTION INTRAMUSCULAR; INTRAVENOUS; SUBCUTANEOUS ONCE
Qty: 0 | Refills: 0 | Status: COMPLETED | OUTPATIENT
Start: 2019-03-20 | End: 2019-03-20

## 2019-03-20 RX ORDER — RAMIPRIL 5 MG
1 CAPSULE ORAL
Qty: 0 | Refills: 0 | COMMUNITY

## 2019-03-20 RX ADMIN — SODIUM CHLORIDE 500 MILLILITER(S): 9 INJECTION INTRAMUSCULAR; INTRAVENOUS; SUBCUTANEOUS at 18:30

## 2019-03-20 RX ADMIN — SODIUM CHLORIDE 1000 MILLILITER(S): 9 INJECTION INTRAMUSCULAR; INTRAVENOUS; SUBCUTANEOUS at 18:00

## 2019-03-20 NOTE — ED ADULT NURSE NOTE - NSIMPLEMENTINTERV_GEN_ALL_ED
Implemented All Universal Safety Interventions:  [place] to call system. Call bell, personal items and telephone within reach. Instruct patient to call for assistance. Room bathroom lighting operational. Non-slip footwear when patient is off stretcher. Physically safe environment: no spills, clutter or unnecessary equipment. Stretcher in lowest position, wheels locked, appropriate side rails in place.

## 2019-03-20 NOTE — ED ADULT TRIAGE NOTE - CHIEF COMPLAINT QUOTE
pt presents via ems for evaluation of FS of 300  at SNF approximately 30 minutes ago. denies any lethargy, n/v, urinary symptoms or any additional symptoms per ems. PMHX: dementia, DM, htn, hyperosmoloar nonketotic DM

## 2019-03-20 NOTE — ED PROVIDER NOTE - OBJECTIVE STATEMENT
Patient has dementia and insulin dose has been altered . Blood sugar noted to be 300 and sent to ED  Patient has no complaints

## 2019-06-16 PROBLEM — Z00.00 ENCOUNTER FOR PREVENTIVE HEALTH EXAMINATION: Status: ACTIVE | Noted: 2019-06-16

## 2019-07-16 ENCOUNTER — APPOINTMENT (OUTPATIENT)
Dept: ENDOCRINOLOGY | Facility: CLINIC | Age: 84
End: 2019-07-16

## 2019-10-28 NOTE — PATIENT PROFILE ADULT - NSPROSPIRITUALVALUESFT_GEN_A_NUR
Discussed condition and exacerbating conditions/situations (e.g., dry/arid environments, overhead fans, air conditioners, side effect of medications). n/a

## 2020-12-01 NOTE — CHART NOTE - NSCHARTNOTEFT_GEN_A_CORE
----- Message from Kaci Peres sent at 12/1/2020 12:20 PM EST -----  Regarding: Asya Peña MD  General Message/Vendor Calls    Caller's first and last name: Soila/Calista Brewer       Reason for call: Medical Records Request       Callback required yes/no and why: Yes      Best contact number(s): [phone] ref/claim #[id number]       Details to clarify the request:Caller is requesting confirmation of medical records request being processed. If not processed, caller is requesting to have medical records sent to:     Fax #[phone]    Mailing Address: [address] [place] [address] Noted on arrival to ICU to shaking chills, remains alert and responsive and complaining she feels cold.  On evaluation noted to have 's, RR 22 with rectal temperature of 102.8.    ICU Vital Signs Last 24 Hrs  T(F): 102.8 (26 Aug 2018 14:57), Max: 102.8 (26 Aug 2018 14:57)  HR: 108 (26 Aug 2018 16:45) (85 - 132)  BP: 111/64 (26 Aug 2018 16:45) (98/64 - 152/84)  BP(mean): 77 (26 Aug 2018 16:45) (71 - 82)  RR: 20 (26 Aug 2018 16:45) (17 - 26)  SpO2: 100% RA (26 Aug 2018 16:45) (76% - 100%)    Labs                      8.7    1.8   )-----------( 143      ( 26 Aug 2018 15:51 )             27.4   08-26  142  |  106  |  22  ----------------------------<  163<H>  3.7   |  23  |  1.35<H>  Ca    8.9      26 Aug 2018 15:51    TPro  7.6  /  Alb  3.4  /  TBili  0.5  /  DBili  x   /  AST  11  /  ALT  16  /  AlkPhos  77  08-26    LIVER FUNCTIONS - ( 26 Aug 2018 10:45 )  Alb: 3.4 g/dL / Pro: 7.6 g/dL / ALK PHOS: 77 U/L / ALT: 16 U/L DA / AST: 11 U/L / GGT: x           PT/INR - ( 26 Aug 2018 10:45 )   PT: 12.1 sec;   INR: 1.09 ratio    PTT - ( 26 Aug 2018 10:45 )  PTT:29.4 sec    CARDIAC MARKERS ( 26 Aug 2018 10:45 )  <.017 ng/mL / x     / x     / x     / x        CAPILLARY BLOOD GLUCOSE  POCT Blood Glucose.: 230 mg/dL (26 Aug 2018 16:54)  POCT Blood Glucose.: 181 mg/dL (26 Aug 2018 15:48)  POCT Blood Glucose.: 228 mg/dL (26 Aug 2018 14:55)  POCT Blood Glucose.: 285 mg/dL (26 Aug 2018 14:00)  POCT Blood Glucose.: 543 mg/dL (26 Aug 2018 12:32)  POCT Blood Glucose.: >600 mg/dL (26 Aug 2018 10:32)  POCT Blood Glucose.: 599 mg/dL (26 Aug 2018 10:30)    Lactic Acid Trend  08-26-18 @ 15:51   -   5.5<HH>    Physical Exam  Gen:  Thin well developed female resting in bed, NAD  ENT:  NC/AT, no JVD noted  Thorax:  Symmetric, no retractions  Lung:  CTA b/l  CV:  S1, S2. RRR  Abd:  Soft, NT/ND.  BS normoactive, no masses to palp.  Midline surgical incision flat, well healed  Extrem:  No C/C/E, DP/radial pulses +2  Neuro:  Alert to self only, no gross motor/sensory deficits    Assessment  83 year old female with PMH HTN, colon CA DM2 and dementia presenting to ED from assisted living with non-anion gap hyperglycemia.  Glucose now improving but now meets criteria for severe sepsis with lactic acidosis,  unclear source but could possibly be reason for profound hyperglycemia.  Received 3L NS bolus this afternoon, cultures and UA sent.  Started Levaquin IV in patient with penicillin allergy.  Monitor fever curve, WBC count and cultures.    Glucose normalizing, Lantus 12 given with sliding scale, continue to monitor blood glucose.    Events discussed with Dr. Tan Noted on arrival to ICU to shaking chills, remains alert and responsive and complaining she feels cold.  On evaluation noted to have 's, RR 22 with rectal temperature of 102.8.    ICU Vital Signs Last 24 Hrs  T(F): 102.8 (26 Aug 2018 14:57), Max: 102.8 (26 Aug 2018 14:57)  HR: 108 (26 Aug 2018 16:45) (85 - 132)  BP: 111/64 (26 Aug 2018 16:45) (98/64 - 152/84)  BP(mean): 77 (26 Aug 2018 16:45) (71 - 82)  RR: 20 (26 Aug 2018 16:45) (17 - 26)  SpO2: 100% RA (26 Aug 2018 16:45) (76% - 100%)    Labs                      8.7    1.8   )-----------( 143      ( 26 Aug 2018 15:51 )             27.4   08-26  142  |  106  |  22  ----------------------------<  163<H>  3.7   |  23  |  1.35<H>  Ca    8.9      26 Aug 2018 15:51    TPro  7.6  /  Alb  3.4  /  TBili  0.5  /  DBili  x   /  AST  11  /  ALT  16  /  AlkPhos  77  08-26    LIVER FUNCTIONS - ( 26 Aug 2018 10:45 )  Alb: 3.4 g/dL / Pro: 7.6 g/dL / ALK PHOS: 77 U/L / ALT: 16 U/L DA / AST: 11 U/L / GGT: x           PT/INR - ( 26 Aug 2018 10:45 )   PT: 12.1 sec;   INR: 1.09 ratio    PTT - ( 26 Aug 2018 10:45 )  PTT:29.4 sec    CARDIAC MARKERS ( 26 Aug 2018 10:45 )  <.017 ng/mL / x     / x     / x     / x        CAPILLARY BLOOD GLUCOSE  POCT Blood Glucose.: 230 mg/dL (26 Aug 2018 16:54)  POCT Blood Glucose.: 181 mg/dL (26 Aug 2018 15:48)  POCT Blood Glucose.: 228 mg/dL (26 Aug 2018 14:55)  POCT Blood Glucose.: 285 mg/dL (26 Aug 2018 14:00)  POCT Blood Glucose.: 543 mg/dL (26 Aug 2018 12:32)  POCT Blood Glucose.: >600 mg/dL (26 Aug 2018 10:32)  POCT Blood Glucose.: 599 mg/dL (26 Aug 2018 10:30)    Lactic Acid Trend  08-26-18 @ 15:51   -   5.5<HH>    Physical Exam  Gen:  Thin well developed female resting in bed, NAD  ENT:  NC/AT, no JVD noted  Thorax:  Symmetric, no retractions  Lung:  CTA b/l  CV:  S1, S2. RRR  Abd:  Soft, NT/ND.  BS normoactive, no masses to palp.  Midline surgical incision flat, well healed  Extrem:  No C/C/E, DP/radial pulses +2  Neuro:  Alert to self only, no gross motor/sensory deficits    Assessment  83 year old female with PMH HTN, colon CA DM2 and dementia presenting to ED from assisted living with non-anion gap hyperglycemia.  Glucose now improving but now meets criteria for severe sepsis with lactic acidosis,  unclear source but could possibly be reason for profound hyperglycemia.  Received 3L NS bolus this afternoon, cultures and UA sent.  Started Levaquin IV in patient with penicillin allergy.  Monitor fever curve, WBC count and cultures.  Tylenol for fever control.    Glucose normalizing, Lantus 12 given with sliding scale, continue to monitor blood glucose.    Events discussed with Dr. Tan

## 2021-06-11 NOTE — ED ADULT NURSE NOTE - DISCHARGE DATE/TIME
Patient admitted this afternoon following hysterectomy, there is no break through bleeding noted to the incision sites, patient reported 7/10 pain however patient is sedated and barely rousable, patient was administered both oral and IV pain medication to help relieve patients discomfort, patient does have positive bowel sounds and was able to tolerate clear sips with pills, patient adequately makes her needs known. Trevino catheter is in place, patient appears content.    25-Sep-2017 00:38

## 2022-06-09 NOTE — DISCHARGE NOTE ADULT - LAUNCH MEDICATION RECONCILIATION
<<-----Click here for Discharge Medication Review Azelaic Acid Counseling: Patient counseled that medicine may cause skin irritation and to avoid applying near the eyes.  In the event of skin irritation, the patient was advised to reduce the amount of the drug applied or use it less frequently.   The patient verbalized understanding of the proper use and possible adverse effects of azelaic acid.  All of the patient's questions and concerns were addressed.

## 2022-07-08 NOTE — ED PROVIDER NOTE - CHIEF COMPLAINT
The patient is a 83y Female complaining of
[FreeTextEntry1] : -Recommend daily moisturizer and topical steroid prn for atopic dermatitis.\par -bathe after going in pool\par -benadryl for insect bites. insect repellents discussed. \par avoid scratching.\par if red, oozing, any concerns, rto

## 2023-06-09 NOTE — ED CLERICAL - NSCLERICAL TASK_GEN_ALL_ED
[FreeTextEntry1] : 30  minutes spent the time noted on the day of this patient encounter preparing for, reviewing records, providing and documenting the above E/M service and 50% of time spent face to face counseling and education provided to patient on disease course, and treatment/management. All questions and concerns were answered and addressed in detail.  Pre-Hospital Care Report (PCR)

## 2024-02-20 NOTE — ED ADULT NURSE NOTE - ED STAT RN HANDOFF DETAILS
Anjali Tripathi is a 12 year old female presenting to the walk-in clinic today with mother for left ankle injury from this am. Pt was getting off of the bus and slipped and fell on ice. Pt with pain to left lateral ankle.    Treatment tried prior to visit: none    Swabs/Specimens collected during rooming process:  None    Work, School or  note needed: Yes    New vs Established: Established    Patient would like communication of their results via:      Cell Phone:   Telephone Information:   Mobile [phone]     Okay to leave a message containing results? Yes     Detailed verbal hand off given to RN Nasreen. Pt's VS and rapid drop in f/s reading noted to MD Corado and ICU PA prior to pt transport. As per MD Corado pt stable for transport to ICU but will require closer monitoring of f/s due to rapid f/s decrease, receiving RN made aware.

## 2024-10-23 NOTE — PROGRESS NOTE ADULT - PROBLEM SELECTOR PLAN 2
Please Approve or Refuse.  Send to Pharmacy per Pt's Request: exact care      Next Visit Date:  1/17/2025   Last Visit Date: 10/16/2024    Hemoglobin A1C (%)   Date Value   09/04/2024 7.3 (H)   01/30/2024 7.9 (H)   09/18/2023 7.5 (H)             ( goal A1C is < 7)   BP Readings from Last 3 Encounters:   10/16/24 114/62   10/10/24 (!) 166/91   10/06/24 (!) 130/56          (goal 120/80)  BUN   Date Value Ref Range Status   10/09/2024 21 8 - 23 mg/dL Final     Creatinine   Date Value Ref Range Status   10/09/2024 0.9 0.50 - 0.90 mg/dL Final     Potassium   Date Value Ref Range Status   10/09/2024 4.1 3.7 - 5.3 mmol/L Final              c/w gentle IVF, repeat lactate in AM  Probably secondary to dehydration from osmotic diuresis from hyperglycemia

## 2024-12-10 NOTE — DIETITIAN INITIAL EVALUATION ADULT. - NUTRITION INTERVENTION
Patient requesting to decrease Ketamine dose in attempt to wean off. Discussed with Dr Case with anesthesia. Ordered dose reduced from 0.25 mg/kg/hr to 0.125 mg/kg/hr.   Medical Food Supplements

## 2025-02-27 NOTE — PROVIDER CONTACT NOTE (CRITICAL VALUE NOTIFICATION) - DATE AND TIME:
26-Aug-2018 19:05 The patient presents to the ED with a bloated abdomen, nausea, neck and shoulder stiffness that began today. The patient reports he has also had diarrhea today. The patient also reports dizziness. The patient denies blood in his stools. The patient denies fever. He did not take any medications prior to arrival.          26-Aug-2018 19:12

## 2025-03-31 NOTE — H&P ADULT - NSHPOUTPATIENTPROVIDERS_GEN_ALL_CORE
PMD:  Dr. Guzman, notified by Dr. Tan 8/26
pt reevaluated, feeling better, labs and ekg reviewed follow up as needed, stay hydrated, rest and follow up with pcp

## 2025-04-28 NOTE — DISCHARGE NOTE ADULT - NS AS DC FU INST LIST INST
Lvm to confirm the frantz tfor the [facility] location for wed April 30 with ROBINA pt / Mary melvin  at 2:30 pm   .  [place] , [place]   [phone]    
no